# Patient Record
Sex: MALE | Race: WHITE | Employment: FULL TIME | URBAN - METROPOLITAN AREA
[De-identification: names, ages, dates, MRNs, and addresses within clinical notes are randomized per-mention and may not be internally consistent; named-entity substitution may affect disease eponyms.]

---

## 2017-03-28 DIAGNOSIS — I10 ESSENTIAL HYPERTENSION: ICD-10-CM

## 2017-03-28 RX ORDER — METOPROLOL SUCCINATE 25 MG/1
25 TABLET, EXTENDED RELEASE ORAL DAILY
Qty: 30 TABLET | Refills: 5 | Status: SHIPPED | OUTPATIENT
Start: 2017-03-28 | End: 2018-03-01 | Stop reason: SDUPTHER

## 2017-04-25 RX ORDER — GLIMEPIRIDE 4 MG/1
TABLET ORAL
Qty: 60 TABLET | Refills: 3 | Status: SHIPPED | OUTPATIENT
Start: 2017-04-25 | End: 2017-11-23 | Stop reason: SDUPTHER

## 2017-10-27 ENCOUNTER — OFFICE VISIT (OUTPATIENT)
Dept: FAMILY MEDICINE CLINIC | Age: 37
End: 2017-10-27

## 2017-10-27 VITALS
BODY MASS INDEX: 34.81 KG/M2 | HEART RATE: 72 BPM | DIASTOLIC BLOOD PRESSURE: 84 MMHG | SYSTOLIC BLOOD PRESSURE: 126 MMHG | HEIGHT: 72 IN | TEMPERATURE: 98.3 F | WEIGHT: 257 LBS | RESPIRATION RATE: 12 BRPM

## 2017-10-27 DIAGNOSIS — E11.42 DIABETIC POLYNEUROPATHY ASSOCIATED WITH TYPE 2 DIABETES MELLITUS (HCC): ICD-10-CM

## 2017-10-27 DIAGNOSIS — E78.5 DYSLIPIDEMIA: ICD-10-CM

## 2017-10-27 LAB
CREATININE URINE: 219 MG/DL
HBA1C MFR BLD: 10.1 %
MICROALBUMIN UR-MCNC: 1.8 MG/DL
MICROALBUMIN/CREAT UR-RTO: 8.2 MG/G (ref 0–30)

## 2017-10-27 PROCEDURE — 99213 OFFICE O/P EST LOW 20 MIN: CPT | Performed by: INTERNAL MEDICINE

## 2017-10-27 PROCEDURE — 83036 HEMOGLOBIN GLYCOSYLATED A1C: CPT | Performed by: INTERNAL MEDICINE

## 2017-10-27 RX ORDER — ATORVASTATIN CALCIUM 20 MG/1
40 TABLET, FILM COATED ORAL DAILY
Qty: 90 TABLET | Refills: 3 | Status: SHIPPED | OUTPATIENT
Start: 2017-10-27 | End: 2018-11-27 | Stop reason: SDUPTHER

## 2017-10-27 RX ORDER — GABAPENTIN 100 MG/1
100 CAPSULE ORAL 2 TIMES DAILY
Qty: 90 CAPSULE | Refills: 3 | Status: SHIPPED | OUTPATIENT
Start: 2017-10-27 | End: 2018-11-27 | Stop reason: DRUGHIGH

## 2017-10-27 ASSESSMENT — ENCOUNTER SYMPTOMS
SHORTNESS OF BREATH: 0
EYE PAIN: 0
BACK PAIN: 0
ABDOMINAL PAIN: 0
COLOR CHANGE: 0
CONSTIPATION: 0
SORE THROAT: 0
VOICE CHANGE: 0
EYE ITCHING: 0
WHEEZING: 0
TROUBLE SWALLOWING: 0
COUGH: 0
EYE DISCHARGE: 0
EYE REDNESS: 0
DIARRHEA: 0
BLOOD IN STOOL: 0
PHOTOPHOBIA: 0
ABDOMINAL DISTENTION: 0
NAUSEA: 0

## 2017-10-27 NOTE — PROGRESS NOTES
Subjective:      Patient ID: Harshad Greene is a 42 y.o. male    HPI     Foot heat x 2 weeks. Patient complains that at work, his feet feel hot (at the top and bottom). He vehemently denies pain, tingling or numbness. No imbalance, no wounds on the feet. Attests to daily foot and toe examinations. No trauma to feet   on the bottom amd top pof feet  No blurry vision, no chest pain no headache or speech problems, no leg cramps. No excessive urination or thirst.     Attests to poor dietary compliance and lack of exercise. Twenty pound weight loss noted since July 2016. 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 Last hb A1c Jan 2016 was 10.1. Past Medical History:   Diagnosis Date    Contact dermatitis     Dyspepsia     GERD (gastroesophageal reflux disease)     Hypertension     Kidney stones 8/2013    Left inguinal hernia     Obesity     Plantar fasciitis     Type II or unspecified type diabetes mellitus without mention of complication, not stated as uncontrolled      Past Surgical History:   Procedure Laterality Date    VASECTOMY       Social History     Social History    Marital status:      Spouse name: N/A    Number of children: N/A    Years of education: N/A     Occupational History    Not on file.      Social History Main Topics    Smoking status: Never Smoker    Smokeless tobacco: Never Used    Alcohol use Yes      Comment: rare    Drug use: Unknown    Sexual activity: Not on file     Other Topics Concern    Not on file     Social History Narrative    No narrative on file     Family History   Problem Relation Age of Onset    Heart Disease Father     Diabetes Maternal Grandfather      Allergies:  Norvasc [amlodipine]  Patient Active Problem List   Diagnosis    Hypertension    Diabetes mellitus type 2, uncontrolled ([address]    Inguinal hernia    Multiple-type hyperlipidemia    Adiposity     Current Outpatient Prescriptions on File Prior to Visit   Medication Sig Dispense Refill    glimepiride (AMARYL) 4 MG tablet Take 1/2 in am;1 in aft;and 1/2 at 3 AM 60 tablet 3    metoprolol succinate (TOPROL XL) 25 MG extended release tablet Take 1 tablet by mouth daily 30 tablet 5    lisinopril (PRINIVIL;ZESTRIL) 40 MG tablet TAKE 1 TABLET BY MOUTH EVERY DAY 90 tablet 3    cephALEXin (KEFLEX) 500 MG capsule Take 1caps tid 21 capsule 0    predniSONE (DELTASONE) 20 MG tablet Take 1 bid x 2 days, then 1.5 qd x 2 days,then 1 daily x 2days,then 1/2 daily till gone 11 tablet 1    clobetasol (TEMOVATE) 0.05 % cream Apply topically 2 times daily. 30 g 1    cetirizine (ZYRTEC) 10 MG tablet   0    ranitidine (ZANTAC) 300 MG tablet   0    HYDROcodone-acetaminophen (NORCO) 5-325 MG per tablet   0    sildenafil (VIAGRA) 100 MG tablet Take 1 tablet by mouth as needed for Erectile Dysfunction 6 tablet 5    betamethasone dipropionate (DIPROLENE) 0.05 % ointment Apply topically daily. 45 g 1    sitaGLIPtan-metformin (JANUMET)  MG per tablet Take 1 tablet by mouth 2 times daily (with meals). 60 tablet 0    ACCU-CHEK FASTCLIX LANCETS MISC Test Tid as directed      glucose blood VI test strips (ACCU-CHEK SMARTVIEW) strip Test TID as directed       No current facility-administered medications on file prior to visit. Review of Systems   Constitutional: Negative for activity change, appetite change, chills, diaphoresis, fatigue, fever and unexpected weight change. HENT: Negative for congestion, dental problem, drooling, ear discharge, ear pain, hearing loss, mouth sores, sore throat, trouble swallowing and voice change. Eyes: Negative for photophobia, pain, discharge, redness and itching. Respiratory: Negative for cough, shortness of breath and wheezing. Gastrointestinal: Negative for abdominal distention, abdominal pain, blood in stool, constipation, diarrhea and nausea. Endocrine: Negative for cold intolerance, heat intolerance, polydipsia and polyuria.    Genitourinary: Amb External Referral To Ophthalmology    insulin glargine (LANTUS SOLOSTAR) 100 UNIT/ML injection pen    gabapentin (NEURONTIN) 100 MG capsule    Insulin Pen Needle (B-D ULTRAFINE III SHORT PEN) 31G X 8 MM MISC   2. Diabetic polyneuropathy associated with type 2 diabetes mellitus (HCC)  Amb External Referral To Podiatry    gabapentin (NEURONTIN) 100 MG capsule   3.  Dyslipidemia  atorvastatin (LIPITOR) 20 MG tablet         Plan:      Orders Placed This Encounter   Procedures    Microalbumin / Creatinine Urine Ratio     Standing Status:   Future     Standing Expiration Date:   10/27/2018    Amb External Referral To Podiatry     Referral Priority:   Routine     Referral Type:   Consult for Advice and Opinion     Referral Reason:   Specialty Services Required     Referred to Provider:   Estee Tate DPM     Requested Specialty:   Podiatry     Number of Visits Requested:   1    Amb External Referral To Ophthalmology     Referral Priority:   Routine     Referral Type:   Consult for Advice and Opinion     Referral Reason:   Specialty Services Required     Referred to Provider:   Cathryn Powell MD     Requested Specialty:   Ophthalmology     Number of Visits Requested:   1    POCT glycosylated hemoglobin (Hb A1C)     Results for POC orders placed in visit on 10/27/17   POCT glycosylated hemoglobin (Hb A1C)   Result Value Ref Range    Hemoglobin A1C 10.1 %       Orders Placed This Encounter   Medications    insulin glargine (LANTUS SOLOSTAR) 100 UNIT/ML injection pen     Sig: Inject 30 Units into the skin nightly     Dispense:  5 Pen     Refill:  3    gabapentin (NEURONTIN) 100 MG capsule     Sig: Take 1 capsule by mouth 2 times daily     Dispense:  90 capsule     Refill:  3    Insulin Pen Needle (B-D ULTRAFINE III SHORT PEN) 31G X 8 MM MISC     Si each by Does not apply route daily     Dispense:  100 each     Refill:  5    atorvastatin (LIPITOR) 20 MG tablet     Sig: Take 2 tablets by mouth daily Dispense:  90 tablet     Refill:  3       Return in about 3 months (around 1/27/2018) for assess response to treatment with Dr. Ludwin Paul. But call back in 1 month to provide BG readings   Offered diabetic dietary  referral but declined.

## 2017-10-30 ENCOUNTER — TELEPHONE (OUTPATIENT)
Dept: FAMILY MEDICINE CLINIC | Age: 37
End: 2017-10-30

## 2017-10-30 NOTE — TELEPHONE ENCOUNTER
Patient was seen on 10/27/17 with Dr Ben Johnson. His wife wanted to know when the patient should take his insulin? She states he was told during the day, but his prescription says at night. She also states that he takes his diabetic pills during the day, and sleeps at night so she just wants to make sure the patient takes it at the correct time. Please advise.

## 2017-11-24 RX ORDER — GLIMEPIRIDE 4 MG/1
TABLET ORAL
Qty: 60 TABLET | Refills: 5 | Status: SHIPPED | OUTPATIENT
Start: 2017-11-24 | End: 2018-06-10 | Stop reason: SDUPTHER

## 2017-11-28 ENCOUNTER — HOSPITAL ENCOUNTER (OUTPATIENT)
Dept: GENERAL RADIOLOGY | Age: 37
Discharge: HOME | End: 2017-11-28
Payer: COMMERCIAL

## 2017-11-28 DIAGNOSIS — R52 PAIN: ICD-10-CM

## 2017-11-28 PROCEDURE — 73630 X-RAY EXAM OF FOOT: CPT

## 2017-12-14 ENCOUNTER — TELEPHONE (OUTPATIENT)
Dept: FAMILY MEDICINE CLINIC | Age: 37
End: 2017-12-14

## 2017-12-14 DIAGNOSIS — Z86.69 HX OF PERIPHERAL NEUROPATHY: Primary | ICD-10-CM

## 2017-12-14 RX ORDER — GABAPENTIN 300 MG/1
CAPSULE ORAL
Qty: 60 CAPSULE | Refills: 3 | Status: SHIPPED | OUTPATIENT
Start: 2017-12-14 | End: 2018-11-27 | Stop reason: SDUPTHER

## 2018-02-01 DIAGNOSIS — I15.9 SECONDARY HYPERTENSION: ICD-10-CM

## 2018-02-01 RX ORDER — LISINOPRIL 40 MG/1
TABLET ORAL
Qty: 90 TABLET | Refills: 2 | Status: SHIPPED | OUTPATIENT
Start: 2018-02-01 | End: 2018-11-27 | Stop reason: SDUPTHER

## 2018-02-01 NOTE — TELEPHONE ENCOUNTER
Pharmacy requesting refill please approve or deny. Last seen 10/27/2017  Last filled 12/14/2016    No future appointments.

## 2018-06-10 RX ORDER — GLIMEPIRIDE 4 MG/1
TABLET ORAL
Qty: 60 TABLET | Refills: 5 | Status: SHIPPED | OUTPATIENT
Start: 2018-06-10 | End: 2018-12-04 | Stop reason: CLARIF

## 2018-11-08 DIAGNOSIS — E78.5 DYSLIPIDEMIA: ICD-10-CM

## 2018-11-08 RX ORDER — ATORVASTATIN CALCIUM 20 MG/1
40 TABLET, FILM COATED ORAL DAILY
Qty: 90 TABLET | Refills: 0 | OUTPATIENT
Start: 2018-11-08

## 2018-11-27 ENCOUNTER — OFFICE VISIT (OUTPATIENT)
Dept: FAMILY MEDICINE CLINIC | Age: 38
End: 2018-11-27
Payer: COMMERCIAL

## 2018-11-27 VITALS
RESPIRATION RATE: 14 BRPM | SYSTOLIC BLOOD PRESSURE: 136 MMHG | HEART RATE: 89 BPM | WEIGHT: 260 LBS | DIASTOLIC BLOOD PRESSURE: 82 MMHG | HEIGHT: 72 IN | TEMPERATURE: 96 F | BODY MASS INDEX: 35.21 KG/M2

## 2018-11-27 DIAGNOSIS — Z86.69 HX OF PERIPHERAL NEUROPATHY: ICD-10-CM

## 2018-11-27 DIAGNOSIS — I10 ESSENTIAL HYPERTENSION: ICD-10-CM

## 2018-11-27 DIAGNOSIS — E78.5 DYSLIPIDEMIA: ICD-10-CM

## 2018-11-27 LAB — HBA1C MFR BLD: 10.7 %

## 2018-11-27 PROCEDURE — 83036 HEMOGLOBIN GLYCOSYLATED A1C: CPT | Performed by: FAMILY MEDICINE

## 2018-11-27 PROCEDURE — 99213 OFFICE O/P EST LOW 20 MIN: CPT | Performed by: FAMILY MEDICINE

## 2018-11-27 RX ORDER — GLIMEPIRIDE 4 MG/1
TABLET ORAL
Qty: 60 TABLET | Refills: 5 | Status: SHIPPED | OUTPATIENT
Start: 2018-11-27

## 2018-11-27 RX ORDER — LISINOPRIL 40 MG/1
TABLET ORAL
Qty: 90 TABLET | Refills: 3 | Status: SHIPPED | OUTPATIENT
Start: 2018-11-27

## 2018-11-27 RX ORDER — GLIMEPIRIDE 4 MG/1
TABLET ORAL
Qty: 60 TABLET | Refills: 5 | Status: CANCELLED | OUTPATIENT
Start: 2018-11-27

## 2018-11-27 RX ORDER — GABAPENTIN 300 MG/1
CAPSULE ORAL
Qty: 180 CAPSULE | Refills: 3 | Status: SHIPPED | OUTPATIENT
Start: 2018-11-27 | End: 2018-12-27

## 2018-11-27 RX ORDER — ATORVASTATIN CALCIUM 20 MG/1
TABLET, FILM COATED ORAL
Qty: 90 TABLET | Refills: 3 | Status: SHIPPED | OUTPATIENT
Start: 2018-11-27

## 2018-11-27 RX ORDER — METOPROLOL SUCCINATE 25 MG/1
25 TABLET, EXTENDED RELEASE ORAL DAILY
Qty: 90 TABLET | Refills: 3 | Status: SHIPPED | OUTPATIENT
Start: 2018-11-27

## 2018-11-27 ASSESSMENT — PATIENT HEALTH QUESTIONNAIRE - PHQ9
2. FEELING DOWN, DEPRESSED OR HOPELESS: 0
SUM OF ALL RESPONSES TO PHQ QUESTIONS 1-9: 0
SUM OF ALL RESPONSES TO PHQ9 QUESTIONS 1 & 2: 0
1. LITTLE INTEREST OR PLEASURE IN DOING THINGS: 0
SUM OF ALL RESPONSES TO PHQ QUESTIONS 1-9: 0

## 2018-11-27 NOTE — PROGRESS NOTES
Father     Diabetes Maternal Grandfather           Current Outpatient Prescriptions on File Prior to Visit   Medication Sig Dispense Refill    Insulin Pen Needle (B-D ULTRAFINE III SHORT PEN) 31G X 8 MM MISC 1 each by Does not apply route daily 100 each 5    ACCU-CHEK FASTCLIX LANCETS MISC Test Tid as directed      glucose blood VI test strips (ACCU-CHEK SMARTVIEW) strip Test TID as directed      insulin glargine (LANTUS SOLOSTAR) 100 UNIT/ML injection pen Inject 30 Units into the skin nightly 5 Pen 3    sildenafil (VIAGRA) 100 MG tablet Take 1 tablet by mouth as needed for Erectile Dysfunction 6 tablet 5     No current facility-administered medications on file prior to visit. Objective    Vitals:    11/27/18 1410   BP: 136/82   Site: Left Upper Arm   Position: Sitting   Cuff Size: Large Adult   Pulse: 89   Resp: 14   Temp: 96 °F (35.6 °C)   TempSrc: Temporal   Weight: 260 lb (117.9 kg)   Height: 5' 11.5\" (1.816 m)     Physical Exam   Constitutional: He is oriented to person, place, and time. He appears well-developed and well-nourished. No distress. HENT:   Head: Normocephalic. Eyes: Pupils are equal, round, and reactive to light. Conjunctivae and EOM are normal.   Neck: Normal range of motion and full passive range of motion without pain. Neck supple. Carotid bruit is not present. No neck rigidity. No thyroid mass and no thyromegaly present. Cardiovascular: Normal rate, regular rhythm, normal heart sounds and intact distal pulses. No extrasystoles are present. Exam reveals no gallop. No murmur heard. Pulses:       Dorsalis pedis pulses are 1+ on the right side, and 1+ on the left side. Posterior tibial pulses are 1+ on the right side, and 1+ on the left side. Pulmonary/Chest: Breath sounds normal. No respiratory distress. Musculoskeletal: He exhibits no edema. Neurological: He is alert and oriented to person, place, and time. He has normal strength.  A sensory deficit (red vib AFTERNOON, AND 1/2 TABLET AT 3AM 60 tablet 5    [DISCONTINUED] lisinopril (PRINIVIL;ZESTRIL) 40 MG tablet TAKE 1 TABLET BY MOUTH EVERY DAY 90 tablet 2    [DISCONTINUED] gabapentin (NEURONTIN) 300 MG capsule Take 1 bid wf 60 capsule 3    insulin glargine (LANTUS SOLOSTAR) 100 UNIT/ML injection pen Inject 30 Units into the skin nightly 5 Pen 3    [DISCONTINUED] gabapentin (NEURONTIN) 100 MG capsule Take 1 capsule by mouth 2 times daily 90 capsule 3    [DISCONTINUED] atorvastatin (LIPITOR) 20 MG tablet Take 2 tablets by mouth daily 90 tablet 3    [DISCONTINUED] cephALEXin (KEFLEX) 500 MG capsule Take 1caps tid 21 capsule 0    [DISCONTINUED] predniSONE (DELTASONE) 20 MG tablet Take 1 bid x 2 days, then 1.5 qd x 2 days,then 1 daily x 2days,then 1/2 daily till gone 11 tablet 1    [DISCONTINUED] clobetasol (TEMOVATE) 0.05 % cream Apply topically 2 times daily. 30 g 1    [DISCONTINUED] cetirizine (ZYRTEC) 10 MG tablet   0    [DISCONTINUED] ranitidine (ZANTAC) 300 MG tablet   0    sildenafil (VIAGRA) 100 MG tablet Take 1 tablet by mouth as needed for Erectile Dysfunction 6 tablet 5    [DISCONTINUED] HYDROcodone-acetaminophen (NORCO) 5-325 MG per tablet   0    [DISCONTINUED] betamethasone dipropionate (DIPROLENE) 0.05 % ointment Apply topically daily. 45 g 1    [DISCONTINUED] sitaGLIPtan-metformin (JANUMET)  MG per tablet Take 1 tablet by mouth 2 times daily (with meals). 60 tablet 0     No facility-administered encounter medications on file as of 11/27/2018. Call if worse  Disc goals-diet  Needs labs. ..   Medications Discontinued During This Encounter   Medication Reason    sitaGLIPtan-metformin (JANUMET)  MG per tablet Therapy completed    predniSONE (DELTASONE) 20 MG tablet Therapy completed    ranitidine (ZANTAC) 300 MG tablet Therapy completed    HYDROcodone-acetaminophen (NORCO) 5-325 MG per tablet Therapy completed    betamethasone dipropionate (DIPROLENE) 0.05 % ointment Therapy

## 2018-12-04 ASSESSMENT — ENCOUNTER SYMPTOMS
NAUSEA: 0
BLOOD IN STOOL: 0
COUGH: 0

## 2018-12-31 ENCOUNTER — TELEPHONE (OUTPATIENT)
Dept: FAMILY MEDICINE CLINIC | Age: 38
End: 2018-12-31

## 2018-12-31 DIAGNOSIS — E11.65 UNCONTROLLED TYPE 2 DIABETES MELLITUS WITH HYPERGLYCEMIA (HCC): Primary | ICD-10-CM

## 2019-02-16 ENCOUNTER — TELEPHONE (OUTPATIENT)
Dept: FAMILY MEDICINE CLINIC | Age: 39
End: 2019-02-16

## 2019-02-21 ENCOUNTER — TELEPHONE (OUTPATIENT)
Dept: FAMILY MEDICINE CLINIC | Age: 39
End: 2019-02-21

## 2019-02-27 ENCOUNTER — TELEPHONE (OUTPATIENT)
Dept: FAMILY MEDICINE CLINIC | Age: 39
End: 2019-02-27

## 2019-02-28 ENCOUNTER — TELEPHONE (OUTPATIENT)
Dept: ENDOCRINOLOGY | Age: 39
End: 2019-02-28

## 2023-08-01 DIAGNOSIS — I10 HYPERTENSION, UNSPECIFIED TYPE: ICD-10-CM

## 2023-08-01 DIAGNOSIS — E11.9 TYPE 2 DIABETES MELLITUS TREATED WITHOUT INSULIN (MULTI): Primary | ICD-10-CM

## 2023-08-01 PROBLEM — E78.5 DYSLIPIDEMIA: Status: ACTIVE | Noted: 2023-08-01

## 2023-08-01 PROBLEM — J01.00 ACUTE NON-RECURRENT MAXILLARY SINUSITIS: Status: ACTIVE | Noted: 2023-08-01

## 2023-08-01 RX ORDER — LISINOPRIL 40 MG/1
40 TABLET ORAL DAILY
Qty: 30 TABLET | Refills: 0 | Status: SHIPPED | OUTPATIENT
Start: 2023-08-01 | End: 2023-08-16 | Stop reason: SDUPTHER

## 2023-08-01 RX ORDER — LISINOPRIL 40 MG/1
40 TABLET ORAL DAILY
COMMUNITY
End: 2023-08-01 | Stop reason: SDUPTHER

## 2023-08-01 RX ORDER — GLIMEPIRIDE 4 MG/1
TABLET ORAL
Qty: 60 TABLET | Refills: 0 | Status: SHIPPED | OUTPATIENT
Start: 2023-08-01 | End: 2023-08-16 | Stop reason: SDUPTHER

## 2023-08-01 RX ORDER — GLIMEPIRIDE 4 MG/1
TABLET ORAL
COMMUNITY
End: 2023-08-01 | Stop reason: SDUPTHER

## 2023-08-01 NOTE — TELEPHONE ENCOUNTER
Rx Refill Request     Name: Montana Kern  :  1980   Medication Name:    Glimepiride 4 MG- Take 1/2 (.05) tablet by mouth twice daily before meals. Take 1 tablet by mouth before dinner.  Lisinopril 40 MG- Take 1 tablet by mouth daily.  Specific Pharmacy location:  [facility]  Date of last appointment:  23  Date of next appointment:  2023   Best number to reach patient:  [phone]

## 2023-08-11 RX ORDER — METOPROLOL SUCCINATE 25 MG/1
1 TABLET, EXTENDED RELEASE ORAL DAILY
COMMUNITY
Start: 2020-02-10 | End: 2023-11-06 | Stop reason: SDUPTHER

## 2023-08-11 RX ORDER — SEMAGLUTIDE 1.34 MG/ML
INJECTION, SOLUTION SUBCUTANEOUS
COMMUNITY
Start: 2022-01-17 | End: 2023-11-06 | Stop reason: SDUPTHER

## 2023-08-11 RX ORDER — ACETAMINOPHEN 500 MG
TABLET ORAL EVERY 8 HOURS PRN
COMMUNITY
End: 2023-08-16 | Stop reason: ALTCHOICE

## 2023-08-11 RX ORDER — METFORMIN HYDROCHLORIDE 1000 MG/1
1000 TABLET ORAL
COMMUNITY
Start: 2018-11-27 | End: 2023-11-02 | Stop reason: SDUPTHER

## 2023-08-11 RX ORDER — SEMAGLUTIDE 1.34 MG/ML
INJECTION, SOLUTION SUBCUTANEOUS
COMMUNITY
Start: 2022-10-04 | End: 2023-08-16 | Stop reason: DRUGHIGH

## 2023-08-11 RX ORDER — VARDENAFIL HYDROCHLORIDE 10 MG/1
10 TABLET ORAL AS NEEDED
COMMUNITY
Start: 2023-03-06 | End: 2023-08-16 | Stop reason: ALTCHOICE

## 2023-08-11 RX ORDER — GABAPENTIN 300 MG/1
1 CAPSULE ORAL 3 TIMES DAILY
COMMUNITY
Start: 2020-02-10 | End: 2023-11-06 | Stop reason: SDUPTHER

## 2023-08-11 RX ORDER — ATORVASTATIN CALCIUM 20 MG/1
20 TABLET, FILM COATED ORAL NIGHTLY
COMMUNITY
End: 2023-11-06 | Stop reason: SDUPTHER

## 2023-08-11 RX ORDER — TRIAMCINOLONE ACETONIDE 1 MG/G
CREAM TOPICAL
COMMUNITY
Start: 2023-07-01 | End: 2023-08-16 | Stop reason: ALTCHOICE

## 2023-08-11 RX ORDER — IBUPROFEN 600 MG/1
TABLET ORAL EVERY 8 HOURS PRN
COMMUNITY
End: 2023-08-16 | Stop reason: ALTCHOICE

## 2023-08-11 RX ORDER — METHYLPREDNISOLONE 4 MG/1
TABLET ORAL
COMMUNITY
Start: 2023-07-01 | End: 2023-08-16 | Stop reason: ALTCHOICE

## 2023-08-16 ENCOUNTER — OFFICE VISIT (OUTPATIENT)
Dept: PRIMARY CARE | Facility: CLINIC | Age: 43
End: 2023-08-16
Payer: COMMERCIAL

## 2023-08-16 VITALS
SYSTOLIC BLOOD PRESSURE: 112 MMHG | RESPIRATION RATE: 16 BRPM | OXYGEN SATURATION: 97 % | WEIGHT: 219 LBS | HEART RATE: 78 BPM | BODY MASS INDEX: 32.44 KG/M2 | DIASTOLIC BLOOD PRESSURE: 76 MMHG | HEIGHT: 69 IN | TEMPERATURE: 97.8 F

## 2023-08-16 DIAGNOSIS — K42.9 UMBILICAL HERNIA WITHOUT OBSTRUCTION AND WITHOUT GANGRENE: Primary | ICD-10-CM

## 2023-08-16 DIAGNOSIS — I10 HYPERTENSION, UNSPECIFIED TYPE: ICD-10-CM

## 2023-08-16 DIAGNOSIS — E11.9 TYPE 2 DIABETES MELLITUS TREATED WITHOUT INSULIN (MULTI): ICD-10-CM

## 2023-08-16 LAB — POC HEMOGLOBIN A1C: 9.7 % (ref 4.2–6.5)

## 2023-08-16 PROCEDURE — 3078F DIAST BP <80 MM HG: CPT | Performed by: FAMILY MEDICINE

## 2023-08-16 PROCEDURE — 4010F ACE/ARB THERAPY RXD/TAKEN: CPT | Performed by: FAMILY MEDICINE

## 2023-08-16 PROCEDURE — 1036F TOBACCO NON-USER: CPT | Performed by: FAMILY MEDICINE

## 2023-08-16 PROCEDURE — 99213 OFFICE O/P EST LOW 20 MIN: CPT | Performed by: FAMILY MEDICINE

## 2023-08-16 PROCEDURE — 3074F SYST BP LT 130 MM HG: CPT | Performed by: FAMILY MEDICINE

## 2023-08-16 PROCEDURE — 83036 HEMOGLOBIN GLYCOSYLATED A1C: CPT | Performed by: FAMILY MEDICINE

## 2023-08-16 RX ORDER — LISINOPRIL 40 MG/1
40 TABLET ORAL DAILY
Qty: 90 TABLET | Refills: 3 | Status: SHIPPED | OUTPATIENT
Start: 2023-08-16

## 2023-08-16 RX ORDER — ASPIRIN 81 MG/1
81 TABLET ORAL DAILY
COMMUNITY
End: 2023-11-27 | Stop reason: HOSPADM

## 2023-08-16 RX ORDER — LISINOPRIL 40 MG/1
40 TABLET ORAL DAILY
Qty: 90 TABLET | Refills: 3 | Status: CANCELLED | OUTPATIENT
Start: 2023-08-16

## 2023-08-16 RX ORDER — GLIMEPIRIDE 4 MG/1
TABLET ORAL
Qty: 180 TABLET | Refills: 3 | Status: SHIPPED | OUTPATIENT
Start: 2023-08-16

## 2023-08-16 RX ORDER — GLIMEPIRIDE 4 MG/1
TABLET ORAL
Qty: 180 TABLET | Refills: 3 | Status: CANCELLED | OUTPATIENT
Start: 2023-08-16

## 2023-08-16 NOTE — PROGRESS NOTES
"Subjective   Patient ID: Montana Kern is a 43 y.o. male who presents for Diabetes (6 month follow up ).  HPI  43-year-old male with a history of hypertension dyslipidemia and type 2 diabetes overall is been off his diet admittedly he is taking his metformin and Amaryl 3 times a day.  He is off his Ozempic and we did review diabetic goals diabetic diet and weight loss.  His last A1c was elevated.  Does take Lipitor at nighttime for dyslipidemia and takes both metoprolol and lisinopril for hypertension  Gratefully no chest pain shortness breath or palpitations no change in visual acuity no change in foot exam  Patient aware of need for improved glycemic control will recheck in 2 to 3 months with appropriate lab  Frequent small meals low in salt carbohydrate and fat again reviewed with the patient  Review of Systems   Constitutional:  Negative for fatigue and fever.   Eyes:  Negative for visual disturbance.   Respiratory:  Negative for cough, chest tightness and shortness of breath.    Cardiovascular:  Negative for chest pain, palpitations and leg swelling.   Gastrointestinal:  Negative for abdominal pain and blood in stool.   Genitourinary:  Positive for frequency. Negative for dysuria and hematuria.   Musculoskeletal:  Positive for myalgias. Negative for arthralgias.   Skin:  Negative for rash.   Neurological:  Negative for weakness, light-headedness and headaches.   Psychiatric/Behavioral:  Negative for behavioral problems, decreased concentration, sleep disturbance and suicidal ideas.        Objective   /76 (BP Location: Right arm, Patient Position: Sitting, BP Cuff Size: Adult)   Pulse 78   Temp 36.6 °C (97.8 °F) (Temporal)   Resp 16   Ht 1.753 m (5' 9\")   Wt 99.3 kg (219 lb)   SpO2 97%   BMI 32.34 kg/m²     Lab Results   Component Value Date    HGBA1C 9.7 (A) 08/16/2023    HGBA1C 9.3 (A) 01/15/2022    HGBA1C 11.5 (A) 10/09/2021     Lab Results   Component Value Date    CREATININE 0.79 01/15/2022 "       Chemistry    Lab Results   Component Value Date/Time     01/15/2022 0859    K 3.9 01/15/2022 0859     01/15/2022 0859    CO2 28 01/15/2022 0859    BUN 13 01/15/2022 0859    CREATININE 0.79 01/15/2022 0859    Lab Results   Component Value Date/Time    CALCIUM 8.8 01/15/2022 0859    ALKPHOS 49 01/15/2022 0859    AST 13 01/15/2022 0859    ALT 15 01/15/2022 0859    BILITOT 0.8 01/15/2022 0859        Physical Exam    Reviewed and normally 7 pound weight loss pulse ox is normal  General-inspection is a large frame male overweight individual in no acute distress  Neck neck is all without masses adenopathy bruits or rigidity  Cardiovascular-RSR without significant murmurs gallop or ectopy  Respiratory-chest clear without wheezing rales or rhonchi  Peripheral vascular no sensorimotor vascular deficits no symmetric or asymmetric edema  Skin no rash petechiae or jaundice  Mood-no anxiety depressive or cognitive issues  Lengthy review in regards to diabetic diet diabetic goals and need to follow-up.  POCT glycosylated hemoglobin (Hb A1C) manually resulted  Order: [id number]  Status: Final result       Visible to patient: Yes (seen)       Next appt: 11/06/2023 at 04:00 PM in [facility] (Leonardo Muller DO)       Dx: Type 2 diabetes mellitus treated with...    2 Result Notes       1  Topic       Component Ref Range & Units 6 d ago   POC HEMOGLOBIN A1c 4.2 - 6.5 % 9.7 Abnormal                        Assessment/Plan   Problem List Items Addressed This Visit          Cardiac and Vasculature    HTN (hypertension)       Endocrine/Metabolic    Type 2 diabetes mellitus treated without insulin (CMS/HCC)    Relevant Orders    POCT glycosylated hemoglobin (Hb A1C) manually resulted   Declines Ozempic because of cost but will take a half at tablet of 4 mg Amaryl before breakfast before lunch and then 4 mg before supper continue metformin twice a day improved low-salt low carbohydrate and low-fat diet  Continue Lipitor at  night and continue his antihypertensive medicines the form of lisinopril and metoprolol.  We will follow-up in 3 months with previsit LDL CMP A1c.  Above meds reviewed and importance of medicine as well as dietary compliance again reviewed maintain eye exam annually as well as nightly foot exam  @discharge  The above diagnosis and treatment plan was discussed with the patient patient will continue appropriate diet and exercise as reviewed  Patient will recheck earlier if any interval problems of significance or clinical worsening of the above problems.  Agrees above surveillance.  All question were addressed regarding above meds

## 2023-08-22 ASSESSMENT — ENCOUNTER SYMPTOMS
MYALGIAS: 1
HEADACHES: 0
SHORTNESS OF BREATH: 0
LIGHT-HEADEDNESS: 0
DECREASED CONCENTRATION: 0
ARTHRALGIAS: 0
WEAKNESS: 0
SLEEP DISTURBANCE: 0
FATIGUE: 0
PALPITATIONS: 0
BLOOD IN STOOL: 0
COUGH: 0
CHEST TIGHTNESS: 0
FEVER: 0
DYSURIA: 0
FREQUENCY: 1
ABDOMINAL PAIN: 0
HEMATURIA: 0

## 2023-08-31 LAB
ANION GAP IN SER/PLAS: 11 MMOL/L (ref 10–20)
CALCIUM (MG/DL) IN SER/PLAS: 8.8 MG/DL (ref 8.6–10.3)
CARBON DIOXIDE, TOTAL (MMOL/L) IN SER/PLAS: 29 MMOL/L (ref 21–32)
CHLORIDE (MMOL/L) IN SER/PLAS: 104 MMOL/L (ref 98–107)
CREATININE (MG/DL) IN SER/PLAS: 0.92 MG/DL (ref 0.5–1.3)
GFR MALE: >90 ML/MIN/1.73M2
GLUCOSE (MG/DL) IN SER/PLAS: 104 MG/DL (ref 74–99)
POTASSIUM (MMOL/L) IN SER/PLAS: 4 MMOL/L (ref 3.5–5.3)
SODIUM (MMOL/L) IN SER/PLAS: 140 MMOL/L (ref 136–145)
UREA NITROGEN (MG/DL) IN SER/PLAS: 14 MG/DL (ref 6–23)

## 2023-11-02 DIAGNOSIS — E11.9 TYPE 2 DIABETES MELLITUS TREATED WITHOUT INSULIN (MULTI): ICD-10-CM

## 2023-11-02 RX ORDER — METFORMIN HYDROCHLORIDE 1000 MG/1
1000 TABLET ORAL
Qty: 180 TABLET | Refills: 3 | Status: SHIPPED | OUTPATIENT
Start: 2023-11-02

## 2023-11-02 NOTE — TELEPHONE ENCOUNTER
Rx Refill Request     Name: Montana Kern  :  1980   Medication Name:  METFORMIN 1,000MG  Specific Pharmacy location:  [facility]  Date of last appointment:  2023   Date of next appointment:  2023   Best number to reach patient:  [phone]

## 2023-11-04 ENCOUNTER — LAB (OUTPATIENT)
Dept: LAB | Facility: LAB | Age: 43
End: 2023-11-04
Payer: COMMERCIAL

## 2023-11-04 DIAGNOSIS — E11.9 TYPE 2 DIABETES MELLITUS TREATED WITHOUT INSULIN (MULTI): ICD-10-CM

## 2023-11-04 DIAGNOSIS — I10 HYPERTENSION, UNSPECIFIED TYPE: ICD-10-CM

## 2023-11-04 LAB
ALBUMIN SERPL BCP-MCNC: 4 G/DL (ref 3.4–5)
ALP SERPL-CCNC: 47 U/L (ref 33–120)
ALT SERPL W P-5'-P-CCNC: 15 U/L (ref 10–52)
ANION GAP SERPL CALC-SCNC: 10 MMOL/L (ref 10–20)
AST SERPL W P-5'-P-CCNC: 13 U/L (ref 9–39)
BILIRUB SERPL-MCNC: 1 MG/DL (ref 0–1.2)
BUN SERPL-MCNC: 12 MG/DL (ref 6–23)
CALCIUM SERPL-MCNC: 9 MG/DL (ref 8.6–10.3)
CHLORIDE SERPL-SCNC: 106 MMOL/L (ref 98–107)
CO2 SERPL-SCNC: 29 MMOL/L (ref 21–32)
CREAT SERPL-MCNC: 0.82 MG/DL (ref 0.5–1.3)
EST. AVERAGE GLUCOSE BLD GHB EST-MCNC: 151 MG/DL
GFR SERPL CREATININE-BSD FRML MDRD: >90 ML/MIN/1.73M*2
GLUCOSE SERPL-MCNC: 146 MG/DL (ref 74–99)
HBA1C MFR BLD: 6.9 %
LDLC SERPL DIRECT ASSAY-MCNC: 84 MG/DL (ref 0–129)
POTASSIUM SERPL-SCNC: 4 MMOL/L (ref 3.5–5.3)
PROT SERPL-MCNC: 6.1 G/DL (ref 6.4–8.2)
SODIUM SERPL-SCNC: 141 MMOL/L (ref 136–145)

## 2023-11-04 PROCEDURE — 83036 HEMOGLOBIN GLYCOSYLATED A1C: CPT

## 2023-11-04 PROCEDURE — 36415 COLL VENOUS BLD VENIPUNCTURE: CPT

## 2023-11-04 PROCEDURE — 80053 COMPREHEN METABOLIC PANEL: CPT

## 2023-11-04 PROCEDURE — 83721 ASSAY OF BLOOD LIPOPROTEIN: CPT

## 2023-11-06 ENCOUNTER — OFFICE VISIT (OUTPATIENT)
Dept: PRIMARY CARE | Facility: CLINIC | Age: 43
End: 2023-11-06
Payer: COMMERCIAL

## 2023-11-06 VITALS
SYSTOLIC BLOOD PRESSURE: 116 MMHG | HEART RATE: 87 BPM | HEIGHT: 69 IN | OXYGEN SATURATION: 97 % | RESPIRATION RATE: 16 BRPM | TEMPERATURE: 97.7 F | BODY MASS INDEX: 31.99 KG/M2 | WEIGHT: 216 LBS | DIASTOLIC BLOOD PRESSURE: 78 MMHG

## 2023-11-06 DIAGNOSIS — I10 HYPERTENSION, UNSPECIFIED TYPE: ICD-10-CM

## 2023-11-06 DIAGNOSIS — E11.9 TYPE 2 DIABETES MELLITUS TREATED WITHOUT INSULIN (MULTI): ICD-10-CM

## 2023-11-06 DIAGNOSIS — E78.5 DYSLIPIDEMIA: ICD-10-CM

## 2023-11-06 PROCEDURE — 4010F ACE/ARB THERAPY RXD/TAKEN: CPT | Performed by: FAMILY MEDICINE

## 2023-11-06 PROCEDURE — 3074F SYST BP LT 130 MM HG: CPT | Performed by: FAMILY MEDICINE

## 2023-11-06 PROCEDURE — 99213 OFFICE O/P EST LOW 20 MIN: CPT | Performed by: FAMILY MEDICINE

## 2023-11-06 PROCEDURE — 3044F HG A1C LEVEL LT 7.0%: CPT | Performed by: FAMILY MEDICINE

## 2023-11-06 PROCEDURE — 3078F DIAST BP <80 MM HG: CPT | Performed by: FAMILY MEDICINE

## 2023-11-06 PROCEDURE — 3048F LDL-C <100 MG/DL: CPT | Performed by: FAMILY MEDICINE

## 2023-11-06 PROCEDURE — 1036F TOBACCO NON-USER: CPT | Performed by: FAMILY MEDICINE

## 2023-11-06 RX ORDER — ATORVASTATIN CALCIUM 20 MG/1
20 TABLET, FILM COATED ORAL NIGHTLY
Qty: 90 TABLET | Refills: 3 | Status: SHIPPED | OUTPATIENT
Start: 2023-11-06

## 2023-11-06 RX ORDER — GABAPENTIN 300 MG/1
300 CAPSULE ORAL 3 TIMES DAILY
Qty: 270 CAPSULE | Refills: 3 | Status: SHIPPED | OUTPATIENT
Start: 2023-11-06

## 2023-11-06 RX ORDER — METOPROLOL SUCCINATE 25 MG/1
25 TABLET, EXTENDED RELEASE ORAL DAILY
Qty: 90 TABLET | Refills: 3 | Status: SHIPPED | OUTPATIENT
Start: 2023-11-06

## 2023-11-06 RX ORDER — SEMAGLUTIDE 1.34 MG/ML
1 INJECTION, SOLUTION SUBCUTANEOUS
Qty: 9 ML | Refills: 3 | Status: SHIPPED | OUTPATIENT
Start: 2023-11-06 | End: 2023-12-10 | Stop reason: SDUPTHER

## 2023-11-06 NOTE — PROGRESS NOTES
"Subjective   Patient ID: Montana Kern is a 43 y.o. male who presents for Diabetes (3 month follow up ).  HPI  43-year-old gentleman is here to recheck his A1c after 9.7 last August and with the addition of markedly improved low carbohydrate diet and Ozempic his A1c is now down to 6.9 which is excellent recent sugars 146 and the remainder of his chemistries are normal as well as his LDL.  He significantly reduced his starches and sweets in great to see his A1c is gone from 9 down to 6.9.  He also takes Lipitor 20 mg for dyslipidemia and also 25 mg metoprolol for hypertension he also follows a low-salt and low-fat diet well  Patient remains active with at least 2 different jobs without any resting or exertional chest pain shortness of breath or palpitations.  Meds reviewed no reported side effects.  Does take also Amaryl 2 mg before breakfast lunch and then 4 mg before supper.  Review of Systems   Constitutional:  Negative for fatigue and fever.   Eyes:  Negative for visual disturbance.   Respiratory:  Negative for cough, chest tightness and shortness of breath.    Cardiovascular:  Negative for chest pain, palpitations and leg swelling.   Gastrointestinal:  Negative for abdominal pain and blood in stool.   Genitourinary:  Positive for frequency. Negative for dysuria and hematuria.   Musculoskeletal:  Positive for myalgias. Negative for arthralgias.   Skin:  Negative for rash.   Neurological:  Positive for numbness (Angling numbness of his feet which is improved with his glycemic control and also Neurontin if needed at nighttime instead of 3 times a day). Negative for weakness, light-headedness and headaches.   Psychiatric/Behavioral:  Negative for behavioral problems, sleep disturbance and suicidal ideas.        Objective   /78 (BP Location: Left arm, Patient Position: Sitting, BP Cuff Size: Large adult)   Pulse 87   Temp 36.5 °C (97.7 °F) (Temporal)   Resp 16   Ht 1.753 m (5' 9\")   Wt 98 kg (216 lb)   " SpO2 97%   BMI 31.90 kg/m²     Lab Results   Component Value Date    HGBA1C 6.9 (H) 11/04/2023    HGBA1C 9.7 (A) 08/16/2023    HGBA1C 9.3 (A) 01/15/2022     Lab Results   Component Value Date    CREATININE 0.82 11/04/2023         Chemistry    Lab Results   Component Value Date/Time     11/04/2023 1143    K 4.0 11/04/2023 1143     11/04/2023 1143    CO2 29 11/04/2023 1143    BUN 12 11/04/2023 1143    CREATININE 0.82 11/04/2023 1143    Lab Results   Component Value Date/Time    CALCIUM 9.0 11/04/2023 1143    ALKPHOS 47 11/04/2023 1143    AST 13 11/04/2023 1143    ALT 15 11/04/2023 1143    BILITOT 1.0 11/04/2023 1143        Component  Ref Range & Units 2 d ago 2 yr ago 3 yr ago   LDL, Direct  0 - 129 mg/dL 84 152 High   CM   Resulting Agency [facility]              Narrative  Performed by: [facility]  Elevated levels of LDL cholesterol are recognized as a key factor in the development of atherosclerosis and CHD. The direct LDL cholesterol test can be used to assess cardiovascular risk and monitor therapy as a follow up to  a lipid profile when triglycerides are significantly elevated.      Specimen Collected: 11/04/23 11:43 Last Resulted: 11/04/23 21:20            Visible to patient: Yes (seen)       Dx: Type 2 diabetes mellitus treated with...    0 Result Notes       1  Topic         Component  Ref Range & Units 2 d ago 1 yr ago 2 yr ago 3 yr ago   Hemoglobin A1C  see below % 6.9 High  9.3 Abnormal  R, CM 11.5 Abnormal  R, CM 10.9 R, CM   Estimated Average Glucose  Not Established mg/dL 151 220 R 283 R 266 R   Resulting Agency [facility]              Narrative  Performed by: [facility]  Diagnosis of Diabetes-Adults  Non-Diabetic: < or = 5.6%  Increased risk for developing diabetes: 5.7-6.4%  Diagnostic of diabetes: > or = 6.5%           Physical Exam  All signs reviewed and normal pulse ox 97  General-inspection reveals a pleasant interactive individual in no acute  distress  Neck neck is supple no mass adenopathy bruits or rigidity  Cardiovascular-RSR without significant murmurs gallop or ectopy  Pulmonary-no wheezing rales or rhonchi  Peripheral vascular mildly reduced vibratory but otherwise no motor or other sensory deficits vascular tone is normal  Skin-no rash petechiae or jaundice  Neurologic-cranial nerves are intact and no focal deficit the upper and lower extremities      Assessment/Plan   Problem List Items Addressed This Visit       Type 2 diabetes mellitus treated without insulin (CMS/[facility])    HTN (hypertension)    Dyslipidemia   Happy significant improvement of his A1c down to 6.9 with the addition of Ozempic to his improved diet and Amaryl.  We will continue this and bring in his Accu-Cheks at the right around the holidays.  Otherwise continue his statin therapy for dyslipidemia and his low-dose metoprolol for hypertension and above lab reviewed excellent LDL and will continue nightly foot examination and annual eye exam.  Call if interval problems  @discharge  The above diagnosis and treatment plan was discussed with the patient patient will continue appropriate diet and exercise as reviewed  Patient will recheck earlier if any interval problems of significance or clinical worsening of the above problems.  Agrees above surveillance.  All question were addressed regarding above meds

## 2023-11-09 ASSESSMENT — ENCOUNTER SYMPTOMS
LIGHT-HEADEDNESS: 0
WEAKNESS: 0
ARTHRALGIAS: 0
HEADACHES: 0
DYSURIA: 0
FATIGUE: 0
PALPITATIONS: 0
NUMBNESS: 1
HEMATURIA: 0
FEVER: 0
MYALGIAS: 1
ABDOMINAL PAIN: 0
SLEEP DISTURBANCE: 0
COUGH: 0
FREQUENCY: 1
BLOOD IN STOOL: 0
SHORTNESS OF BREATH: 0
CHEST TIGHTNESS: 0

## 2023-11-20 NOTE — PREPROCEDURE INSTRUCTIONS
Reviewed medical history and current medications. Holding ASA. NPO after midnight. Patient verbalized understanding.

## 2023-11-26 ENCOUNTER — PREP FOR PROCEDURE (OUTPATIENT)
Dept: SURGERY | Facility: HOSPITAL | Age: 43
End: 2023-11-26
Payer: COMMERCIAL

## 2023-11-26 RX ORDER — HEPARIN SODIUM 5000 [USP'U]/ML
5000 INJECTION, SOLUTION INTRAVENOUS; SUBCUTANEOUS ONCE
Status: CANCELLED | OUTPATIENT
Start: 2023-11-26 | End: 2023-11-26

## 2023-11-27 ENCOUNTER — HOSPITAL ENCOUNTER (OUTPATIENT)
Facility: HOSPITAL | Age: 43
Setting detail: OUTPATIENT SURGERY
Discharge: HOME | End: 2023-11-27
Attending: SURGERY | Admitting: SURGERY
Payer: COMMERCIAL

## 2023-11-27 ENCOUNTER — ANESTHESIA EVENT (OUTPATIENT)
Dept: OPERATING ROOM | Facility: HOSPITAL | Age: 43
End: 2023-11-27
Payer: COMMERCIAL

## 2023-11-27 ENCOUNTER — ANESTHESIA (OUTPATIENT)
Dept: OPERATING ROOM | Facility: HOSPITAL | Age: 43
End: 2023-11-27
Payer: COMMERCIAL

## 2023-11-27 VITALS
DIASTOLIC BLOOD PRESSURE: 85 MMHG | HEART RATE: 83 BPM | BODY MASS INDEX: 31.44 KG/M2 | RESPIRATION RATE: 18 BRPM | TEMPERATURE: 97.7 F | HEIGHT: 69 IN | OXYGEN SATURATION: 95 % | SYSTOLIC BLOOD PRESSURE: 139 MMHG | WEIGHT: 212.3 LBS

## 2023-11-27 DIAGNOSIS — E11.9 TYPE 2 DIABETES MELLITUS TREATED WITHOUT INSULIN (MULTI): ICD-10-CM

## 2023-11-27 DIAGNOSIS — K40.20 NON-RECURRENT BILATERAL INGUINAL HERNIA WITHOUT OBSTRUCTION OR GANGRENE: Primary | ICD-10-CM

## 2023-11-27 LAB
GLUCOSE BLD MANUAL STRIP-MCNC: 152 MG/DL (ref 74–99)
GLUCOSE BLD MANUAL STRIP-MCNC: 172 MG/DL (ref 74–99)

## 2023-11-27 PROCEDURE — 2500000004 HC RX 250 GENERAL PHARMACY W/ HCPCS (ALT 636 FOR OP/ED): Performed by: SURGERY

## 2023-11-27 PROCEDURE — 3600000008 HC OR TIME - EACH INCREMENTAL 1 MINUTE - PROCEDURE LEVEL THREE: Performed by: SURGERY

## 2023-11-27 PROCEDURE — 2500000004 HC RX 250 GENERAL PHARMACY W/ HCPCS (ALT 636 FOR OP/ED): Performed by: ANESTHESIOLOGY

## 2023-11-27 PROCEDURE — 96372 THER/PROPH/DIAG INJ SC/IM: CPT | Mod: 59 | Performed by: SURGERY

## 2023-11-27 PROCEDURE — 7100000002 HC RECOVERY ROOM TIME - EACH INCREMENTAL 1 MINUTE: Performed by: SURGERY

## 2023-11-27 PROCEDURE — 49650 LAP ING HERNIA REPAIR INIT: CPT | Performed by: SURGERY

## 2023-11-27 PROCEDURE — 2500000004 HC RX 250 GENERAL PHARMACY W/ HCPCS (ALT 636 FOR OP/ED): Performed by: NURSE ANESTHETIST, CERTIFIED REGISTERED

## 2023-11-27 PROCEDURE — 49591 RPR AA HRN 1ST < 3 CM RDC: CPT | Performed by: SURGERY

## 2023-11-27 PROCEDURE — 2500000001 HC RX 250 WO HCPCS SELF ADMINISTERED DRUGS (ALT 637 FOR MEDICARE OP): Performed by: ANESTHESIOLOGY

## 2023-11-27 PROCEDURE — 3700000002 HC GENERAL ANESTHESIA TIME - EACH INCREMENTAL 1 MINUTE: Performed by: SURGERY

## 2023-11-27 PROCEDURE — 3700000001 HC GENERAL ANESTHESIA TIME - INITIAL BASE CHARGE: Performed by: SURGERY

## 2023-11-27 PROCEDURE — A4217 STERILE WATER/SALINE, 500 ML: HCPCS | Performed by: SURGERY

## 2023-11-27 PROCEDURE — C1781 MESH (IMPLANTABLE): HCPCS | Performed by: SURGERY

## 2023-11-27 PROCEDURE — 82947 ASSAY GLUCOSE BLOOD QUANT: CPT

## 2023-11-27 PROCEDURE — 3600000003 HC OR TIME - INITIAL BASE CHARGE - PROCEDURE LEVEL THREE: Performed by: SURGERY

## 2023-11-27 PROCEDURE — 2780000003 HC OR 278 NO HCPCS: Performed by: SURGERY

## 2023-11-27 PROCEDURE — 7100000010 HC PHASE TWO TIME - EACH INCREMENTAL 1 MINUTE: Performed by: SURGERY

## 2023-11-27 PROCEDURE — 7100000009 HC PHASE TWO TIME - INITIAL BASE CHARGE: Performed by: SURGERY

## 2023-11-27 PROCEDURE — 2500000005 HC RX 250 GENERAL PHARMACY W/O HCPCS: Performed by: NURSE ANESTHETIST, CERTIFIED REGISTERED

## 2023-11-27 PROCEDURE — 7100000001 HC RECOVERY ROOM TIME - INITIAL BASE CHARGE: Performed by: SURGERY

## 2023-11-27 PROCEDURE — 2500000005 HC RX 250 GENERAL PHARMACY W/O HCPCS: Performed by: SURGERY

## 2023-11-27 PROCEDURE — 2720000007 HC OR 272 NO HCPCS: Performed by: SURGERY

## 2023-11-27 DEVICE — VENTRALEX HERNIA PATCH, 6.4 CM (2.5"), MEDIUM CIRCLE WITH STRAP
Type: IMPLANTABLE DEVICE | Site: UMBILICAL | Status: FUNCTIONAL
Brand: VENTRALEX

## 2023-11-27 DEVICE — 3DMAX LIGHT MESH, RIGHT, LARGE
Type: IMPLANTABLE DEVICE | Site: INGUINAL | Status: FUNCTIONAL
Brand: 3DMAX LIGHT MESH

## 2023-11-27 DEVICE — 3DMAX LIGHT MESH, LEFT, LARGE
Type: IMPLANTABLE DEVICE | Site: INGUINAL | Status: FUNCTIONAL
Brand: 3DMAX LIGHT MESH

## 2023-11-27 RX ORDER — LIDOCAINE HYDROCHLORIDE 20 MG/ML
INJECTION, SOLUTION INFILTRATION; PERINEURAL AS NEEDED
Status: DISCONTINUED | OUTPATIENT
Start: 2023-11-27 | End: 2023-11-27

## 2023-11-27 RX ORDER — SODIUM CHLORIDE 0.9 G/100ML
IRRIGANT IRRIGATION AS NEEDED
Status: DISCONTINUED | OUTPATIENT
Start: 2023-11-27 | End: 2023-11-27 | Stop reason: HOSPADM

## 2023-11-27 RX ORDER — HEPARIN SODIUM 5000 [USP'U]/ML
5000 INJECTION, SOLUTION INTRAVENOUS; SUBCUTANEOUS ONCE
Status: COMPLETED | OUTPATIENT
Start: 2023-11-27 | End: 2023-11-27

## 2023-11-27 RX ORDER — OXYCODONE HYDROCHLORIDE 5 MG/1
5 TABLET ORAL EVERY 4 HOURS PRN
Status: DISCONTINUED | OUTPATIENT
Start: 2023-11-27 | End: 2023-11-27 | Stop reason: HOSPADM

## 2023-11-27 RX ORDER — BUPIVACAINE HCL/EPINEPHRINE 0.25-.0005
VIAL (ML) INJECTION AS NEEDED
Status: DISCONTINUED | OUTPATIENT
Start: 2023-11-27 | End: 2023-11-27 | Stop reason: HOSPADM

## 2023-11-27 RX ORDER — SODIUM CHLORIDE, SODIUM LACTATE, POTASSIUM CHLORIDE, CALCIUM CHLORIDE 600; 310; 30; 20 MG/100ML; MG/100ML; MG/100ML; MG/100ML
125 INJECTION, SOLUTION INTRAVENOUS CONTINUOUS
Status: DISCONTINUED | OUTPATIENT
Start: 2023-11-27 | End: 2023-11-27 | Stop reason: HOSPADM

## 2023-11-27 RX ORDER — GABAPENTIN 300 MG/1
300 CAPSULE ORAL 3 TIMES DAILY PRN
Qty: 20 CAPSULE | Refills: 0 | Status: SHIPPED | OUTPATIENT
Start: 2023-11-27 | End: 2024-02-04 | Stop reason: WASHOUT

## 2023-11-27 RX ORDER — MIDAZOLAM HYDROCHLORIDE 1 MG/ML
INJECTION, SOLUTION INTRAMUSCULAR; INTRAVENOUS AS NEEDED
Status: DISCONTINUED | OUTPATIENT
Start: 2023-11-27 | End: 2023-11-27

## 2023-11-27 RX ORDER — ONDANSETRON HYDROCHLORIDE 2 MG/ML
INJECTION, SOLUTION INTRAVENOUS AS NEEDED
Status: DISCONTINUED | OUTPATIENT
Start: 2023-11-27 | End: 2023-11-27

## 2023-11-27 RX ORDER — DROPERIDOL 2.5 MG/ML
0.62 INJECTION, SOLUTION INTRAMUSCULAR; INTRAVENOUS ONCE AS NEEDED
Status: DISCONTINUED | OUTPATIENT
Start: 2023-11-27 | End: 2023-11-27 | Stop reason: HOSPADM

## 2023-11-27 RX ORDER — MEPERIDINE HYDROCHLORIDE 25 MG/ML
12.5 INJECTION INTRAMUSCULAR; INTRAVENOUS; SUBCUTANEOUS EVERY 10 MIN PRN
Status: DISCONTINUED | OUTPATIENT
Start: 2023-11-27 | End: 2023-11-27 | Stop reason: HOSPADM

## 2023-11-27 RX ORDER — ALBUTEROL SULFATE 0.83 MG/ML
2.5 SOLUTION RESPIRATORY (INHALATION) ONCE
Status: DISCONTINUED | OUTPATIENT
Start: 2023-11-27 | End: 2023-11-27 | Stop reason: HOSPADM

## 2023-11-27 RX ORDER — HYDROCODONE BITARTRATE AND ACETAMINOPHEN 5; 325 MG/1; MG/1
1 TABLET ORAL EVERY 6 HOURS PRN
Qty: 12 TABLET | Refills: 0 | Status: SHIPPED | OUTPATIENT
Start: 2023-11-27 | End: 2023-12-04

## 2023-11-27 RX ORDER — LABETALOL HYDROCHLORIDE 5 MG/ML
5 INJECTION, SOLUTION INTRAVENOUS ONCE AS NEEDED
Status: DISCONTINUED | OUTPATIENT
Start: 2023-11-27 | End: 2023-11-27 | Stop reason: HOSPADM

## 2023-11-27 RX ORDER — DIPHENHYDRAMINE HYDROCHLORIDE 50 MG/ML
INJECTION INTRAMUSCULAR; INTRAVENOUS AS NEEDED
Status: DISCONTINUED | OUTPATIENT
Start: 2023-11-27 | End: 2023-11-27

## 2023-11-27 RX ORDER — MIDAZOLAM HYDROCHLORIDE 1 MG/ML
1 INJECTION, SOLUTION INTRAMUSCULAR; INTRAVENOUS ONCE AS NEEDED
Status: DISCONTINUED | OUTPATIENT
Start: 2023-11-27 | End: 2023-11-27 | Stop reason: HOSPADM

## 2023-11-27 RX ORDER — CEFAZOLIN SODIUM 2 G/100ML
2 INJECTION, SOLUTION INTRAVENOUS ONCE
Status: COMPLETED | OUTPATIENT
Start: 2023-11-27 | End: 2023-11-27

## 2023-11-27 RX ORDER — PROPOFOL 10 MG/ML
INJECTION, EMULSION INTRAVENOUS AS NEEDED
Status: DISCONTINUED | OUTPATIENT
Start: 2023-11-27 | End: 2023-11-27

## 2023-11-27 RX ORDER — SODIUM CHLORIDE, SODIUM LACTATE, POTASSIUM CHLORIDE, CALCIUM CHLORIDE 600; 310; 30; 20 MG/100ML; MG/100ML; MG/100ML; MG/100ML
100 INJECTION, SOLUTION INTRAVENOUS CONTINUOUS
Status: DISCONTINUED | OUTPATIENT
Start: 2023-11-27 | End: 2023-11-27 | Stop reason: HOSPADM

## 2023-11-27 RX ORDER — LIDOCAINE HYDROCHLORIDE 10 MG/ML
0.1 INJECTION, SOLUTION EPIDURAL; INFILTRATION; INTRACAUDAL; PERINEURAL ONCE
Status: DISCONTINUED | OUTPATIENT
Start: 2023-11-27 | End: 2023-11-27 | Stop reason: HOSPADM

## 2023-11-27 RX ORDER — DEXAMETHASONE SODIUM PHOSPHATE 4 MG/ML
INJECTION, SOLUTION INTRA-ARTICULAR; INTRALESIONAL; INTRAMUSCULAR; INTRAVENOUS; SOFT TISSUE AS NEEDED
Status: DISCONTINUED | OUTPATIENT
Start: 2023-11-27 | End: 2023-11-27

## 2023-11-27 RX ORDER — FENTANYL CITRATE 50 UG/ML
INJECTION, SOLUTION INTRAMUSCULAR; INTRAVENOUS AS NEEDED
Status: DISCONTINUED | OUTPATIENT
Start: 2023-11-27 | End: 2023-11-27

## 2023-11-27 RX ORDER — ROCURONIUM BROMIDE 10 MG/ML
INJECTION, SOLUTION INTRAVENOUS AS NEEDED
Status: DISCONTINUED | OUTPATIENT
Start: 2023-11-27 | End: 2023-11-27

## 2023-11-27 RX ADMIN — FENTANYL CITRATE 75 MCG: 50 INJECTION, SOLUTION INTRAMUSCULAR; INTRAVENOUS at 10:03

## 2023-11-27 RX ADMIN — ROCURONIUM BROMIDE 20 MG: 10 SOLUTION INTRAVENOUS at 12:17

## 2023-11-27 RX ADMIN — SUGAMMADEX 200 MG: 100 INJECTION, SOLUTION INTRAVENOUS at 14:07

## 2023-11-27 RX ADMIN — ROCURONIUM BROMIDE 20 MG: 10 SOLUTION INTRAVENOUS at 11:29

## 2023-11-27 RX ADMIN — PROPOFOL 200 MG: 10 INJECTION, EMULSION INTRAVENOUS at 10:04

## 2023-11-27 RX ADMIN — ROCURONIUM BROMIDE 10 MG: 10 SOLUTION INTRAVENOUS at 12:45

## 2023-11-27 RX ADMIN — MIDAZOLAM 2 MG: 1 INJECTION INTRAMUSCULAR; INTRAVENOUS at 09:58

## 2023-11-27 RX ADMIN — HEPARIN SODIUM 5000 UNITS: 5000 INJECTION INTRAVENOUS; SUBCUTANEOUS at 08:35

## 2023-11-27 RX ADMIN — SODIUM CHLORIDE, SODIUM LACTATE, POTASSIUM CHLORIDE, AND CALCIUM CHLORIDE 125 ML/HR: 600; 310; 30; 20 INJECTION, SOLUTION INTRAVENOUS at 08:35

## 2023-11-27 RX ADMIN — ROCURONIUM BROMIDE 70 MG: 10 SOLUTION INTRAVENOUS at 10:05

## 2023-11-27 RX ADMIN — ROCURONIUM BROMIDE 10 MG: 10 SOLUTION INTRAVENOUS at 11:59

## 2023-11-27 RX ADMIN — FENTANYL CITRATE 25 MCG: 50 INJECTION, SOLUTION INTRAMUSCULAR; INTRAVENOUS at 10:48

## 2023-11-27 RX ADMIN — SODIUM CHLORIDE, SODIUM LACTATE, POTASSIUM CHLORIDE, AND CALCIUM CHLORIDE: 600; 310; 30; 20 INJECTION, SOLUTION INTRAVENOUS at 11:49

## 2023-11-27 RX ADMIN — FENTANYL CITRATE 25 MCG: 50 INJECTION, SOLUTION INTRAMUSCULAR; INTRAVENOUS at 14:03

## 2023-11-27 RX ADMIN — OXYCODONE HYDROCHLORIDE 5 MG: 5 TABLET ORAL at 15:47

## 2023-11-27 RX ADMIN — DIPHENHYDRAMINE HYDROCHLORIDE 12.5 MG: 50 INJECTION, SOLUTION INTRAMUSCULAR; INTRAVENOUS at 10:22

## 2023-11-27 RX ADMIN — ROCURONIUM BROMIDE 10 MG: 10 SOLUTION INTRAVENOUS at 10:43

## 2023-11-27 RX ADMIN — ROCURONIUM BROMIDE 20 MG: 10 SOLUTION INTRAVENOUS at 13:11

## 2023-11-27 RX ADMIN — LIDOCAINE HYDROCHLORIDE 100 MG: 20 INJECTION, SOLUTION INFILTRATION; PERINEURAL at 10:04

## 2023-11-27 RX ADMIN — ONDANSETRON 4 MG: 2 INJECTION INTRAMUSCULAR; INTRAVENOUS at 13:31

## 2023-11-27 RX ADMIN — CEFAZOLIN SODIUM 2 G: 2 INJECTION, SOLUTION INTRAVENOUS at 10:11

## 2023-11-27 RX ADMIN — DEXAMETHASONE SODIUM PHOSPHATE 4 MG: 4 INJECTION, SOLUTION INTRAMUSCULAR; INTRAVENOUS at 10:19

## 2023-11-27 SDOH — HEALTH STABILITY: MENTAL HEALTH: CURRENT SMOKER: 0

## 2023-11-27 ASSESSMENT — PAIN SCALES - GENERAL
PAINLEVEL_OUTOF10: 0 - NO PAIN
PAINLEVEL_OUTOF10: 3
PAINLEVEL_OUTOF10: 0 - NO PAIN
PAINLEVEL_OUTOF10: 3
PAINLEVEL_OUTOF10: 4
PAIN_LEVEL: 0
PAINLEVEL_OUTOF10: 3

## 2023-11-27 ASSESSMENT — PAIN - FUNCTIONAL ASSESSMENT
PAIN_FUNCTIONAL_ASSESSMENT: 0-10

## 2023-11-27 ASSESSMENT — COLUMBIA-SUICIDE SEVERITY RATING SCALE - C-SSRS
2. HAVE YOU ACTUALLY HAD ANY THOUGHTS OF KILLING YOURSELF?: NO
1. IN THE PAST MONTH, HAVE YOU WISHED YOU WERE DEAD OR WISHED YOU COULD GO TO SLEEP AND NOT WAKE UP?: NO
6. HAVE YOU EVER DONE ANYTHING, STARTED TO DO ANYTHING, OR PREPARED TO DO ANYTHING TO END YOUR LIFE?: NO

## 2023-11-27 ASSESSMENT — PAIN DESCRIPTION - DESCRIPTORS: DESCRIPTORS: BURNING;HEAVINESS

## 2023-11-27 NOTE — DISCHARGE INSTRUCTIONS
General Anesthesia Discharge Instructions    About this topic  You may need general anesthesia if you need to be asleep during a procedure. Your doctor will use drugs to block the signals that go from your nerves to your brain. Doctors give general anesthesia during a surgery or procedure to:  Allow you to sleep  Help your body be still  Relax your muscles  Help you to relax and be pain free  Keep you from remembering the surgery  Let the doctor manage your airway, breathing, and blood flow  The doctor or nurse anesthetist gives general anesthesia by a shot into your vein. Sometimes, you may breathe in a gas through a mask placed over your face.  What care is needed at home?  Ask your doctor what you need to do when you go home. Make sure you ask questions if you do not understand what the doctor says.  Your doctor may give you drugs to prevent or treat an upset stomach from the anesthetic. Take them as ordered.  If your throat is sore, suck on ice chips or popsicles to ease throat pain.  Put 2 to 3 pillows under your head and back when you lie down to help you breathe easier.  For the first 24 to 48 hours:  Do not operate heavy or dangerous machinery.  Do not make major decisions or sign important papers. You may not be able to think clearly.  Avoid beer, wine, or mixed drinks.  You are at a higher risk of falling for at least 24 hours after general anesthesia.  Take extra care when you get up.  Do not change positions quickly.  Do not rush when you need to go to the bathroom or to answer the phone.  Ask for help if you feel unsteady when you try to walk.  Wear shoes with non-slip soles and low heels.  What follow-up care is needed?  Your doctor may ask you to come back to the office to check on your progress. Be sure to keep these visits.  If you have stitches that do not dissolve or staples, you will need to have them removed. Your doctor will want to do this in 1 to 2 weeks. If the doctor used skin glue, the  glue will fall off on its own.  What drugs may be needed?  The doctor may order drugs to:  Help with pain  Treat an upset stomach or throwing up  Will physical activity be limited?  You will not be allowed to drive right away after the procedure. Ask a family member or a friend to drive you home.  Avoid trying to get out of bed without help until you are sure of your balance.  You may have to limit your activity. Talk to your doctor about if you need to limit how much you lift or limit exercise after your procedure.  What changes to diet are needed?  Start with a light diet when you are fully awake. This includes things that are easy to swallow like soups, pudding, jello, toast, and eggs. Slowly progress to your normal diet.  What problems could happen?  Low blood pressure  Breathing problems  Upset stomach or throwing up  Dizziness  Blood clots  Infection  When do I need to call the doctor?  Trouble breathing  Upset stomach or throwing up more than 3 times in the next 2 days  Dizziness  Teach Back: Helping You Understand  The Teach Back Method helps you understand the information we are giving you. After you talk with the staff, tell them in your own words what you learned. This helps to make sure the staff has described each thing clearly. It also helps to explain things that may have been confusing. Before going home, make sure you can do these:  I can tell you about my procedure.  I can tell you if I need to follow up with my doctor.  I can tell you what is good for me to eat and drink the next day.  I can tell you what I would do if I have trouble breathing, an upset stomach, or dizziness.  Where can I learn more?  [employer]  https://www.nigms.nih.gov/education/pages/factsheet_Anesthesia.aspx  N[facility] Choices  http://www.nhs.uk/conditions/Anaesthetic-general/Pages/Definition.aspx  Last Reviewed Date  2020-04-22

## 2023-11-27 NOTE — ANESTHESIA POSTPROCEDURE EVALUATION
Patient: Montana Kern    Procedure Summary       Date: 11/27/23 Room / Location: [facility] / [facility]    Anesthesia Start: 0958 Anesthesia Stop: 1420    Procedures:       OPEN UMBILICAL HERNIA REPAIR (Abdomen)      LAPAROSCOPIC BILATERAL HERNIA REPAIR W/MESH  (INGUINAL) (Bilateral: Groin) Diagnosis:       Non-recurrent bilateral inguinal hernia without obstruction or gangrene      Umbilical hernia without obstruction and without gangrene      (UMBILICAL AND BILATERAL INGUINAL HERNIA)    Surgeons: Giorgio PLASENCIA MD Responsible Provider: Jose Smith MD    Anesthesia Type: general ASA Status: 2            Anesthesia Type: general    Vitals Value Taken Time   /95 11/27/23 1415   Temp 36.4 11/27/23 1420   Pulse 82 11/27/23 1419   Resp 21 11/27/23 1419   SpO2 100 % 11/27/23 1419   Vitals shown include unvalidated device data.    Anesthesia Post Evaluation    Patient location during evaluation: PACU  Patient participation: waiting for patient participation  Level of consciousness: responsive to verbal stimuli  Pain score: 0  Pain management: adequate  There was medical reason for not using a multimodal analgesia pain management approach.Airway patency: patent  There was medical reason for not screening for obstructive sleep apnea and/or not using of two or more mitigation strategies.Cardiovascular status: hemodynamically stable  Respiratory status: nonlabored ventilation, spontaneous ventilation and face mask  Hydration status: euvolemic  Postoperative Nausea and Vomiting: none        There were no known notable events for this encounter.

## 2023-11-27 NOTE — ANESTHESIA PROCEDURE NOTES
Airway  Date/Time: 11/27/2023 10:07 AM  Urgency: elective    Airway not difficult    Staffing  Performed: CRNA   Authorized by: Jose Smith MD    Performed by: RICKY Easley  Patient location during procedure: OR    Indications and Patient Condition  Indications for airway management: anesthesia  Spontaneous Ventilation: absent  Sedation level: deep  Preoxygenated: yes  Patient position: sniffing  MILS not maintained throughout  Mask difficulty assessment: 2 - vent by mask + OA or adjuvant +/- NMBA  No planned trial extubation    Final Airway Details  Final airway type: endotracheal airway      Successful airway: ETT  Cuffed: yes   Successful intubation technique: video laryngoscopy  Facilitating devices/methods: intubating stylet  Endotracheal tube insertion site: oral  Blade: Simeon (Miranda laryngoscope)  Blade size: #4  ETT size (mm): 7.5  Cormack-Lehane Classification: grade I - full view of glottis  Placement verified by: chest auscultation and capnometry   Measured from: lips  ETT to lips (cm): 23  Number of attempts at approach: 1  Ventilation between attempts: none  Number of other approaches attempted: 0

## 2023-11-27 NOTE — ANESTHESIA PREPROCEDURE EVALUATION
Patient: Montana Kern    Procedure Information       Date/Time: 11/27/23 4934    Procedures:       OPEN UMBILICAL HERNIA REPAIR (Abdomen)      LAPAROSCOPIC BILATERAL HERNIA REPAIR W/MESH POSS OPEN  (INGUINAL) DIABETIC (Bilateral)    Location: [facility]    Surgeons: Giorgio PLASENCIA MD            Relevant Problems   Anesthesia (within normal limits)      Cardiovascular   (+) HTN (hypertension)      Endocrine   (+) Type 2 diabetes mellitus treated without insulin (CMS/HCC)      GI (within normal limits)      /Renal (within normal limits)      Neuro/Psych (within normal limits)      Pulmonary (within normal limits)      GI/Hepatic (within normal limits)      Hematology (within normal limits)      Musculoskeletal (within normal limits)      Eyes, Ears, Nose, and Throat (within normal limits)      Infectious Disease (within normal limits)       Clinical information reviewed:   Tobacco  Allergies  Meds   Med Hx  Surg Hx   Fam Hx  Soc Hx        NPO Detail:  No data recorded     Physical Exam    Airway  Mallampati: II     Cardiovascular - normal exam  Rhythm: regular     Dental - normal exam     Pulmonary - normal exam     Abdominal - normal exam       Other findings: Facial hair.          Anesthesia Plan    ASA 2     general     The patient is not a current smoker.  Patient was not previously instructed to abstain from smoking on day of procedure.  Patient did not smoke on day of procedure.    intravenous induction   Anesthetic plan and risks discussed with patient.    Plan discussed with CRNA.

## 2023-11-27 NOTE — OP NOTE
OPEN UMBILICAL HERNIA REPAIR, LAPAROSCOPIC BILATERAL HERNIA REPAIR W/MESH  (INGUINAL) (B) Operative Note     Date: 2023  OR Location: [facility]    Name: Montana Kern, : 1980, Age: 43 y.o., MRN: [id number], Sex: male    Diagnosis  Bilateral inguinal hernia, umbilical hernia Post-op Diagnosis     * Non-recurrent bilateral inguinal hernia without obstruction or gangrene [K40.20]     * Umbilical hernia without obstruction and without gangrene [K42.9]     Procedures  OPEN UMBILICAL HERNIA REPAIR  41396 - [place] RPR AA HERNIA 1ST < 3 CM REDUCIBLE    LAPAROSCOPIC BILATERAL HERNIA REPAIR W/MESH  (INGUINAL)  14456 - [place] LAPAROSCOPY SURG RPR INITIAL INGUINAL HERNIA      Surgeons      * Giorgio Pimentel V - Primary    Resident/Fellow/Other Assistant:  Surgeon(s) and Role:  Shayne Mota  Procedure Summary  Anesthesia: General  ASA: II  Anesthesia Staff: Anesthesiologist: Jose Smith MD  CRNA: JONH Easley-CRNA  Estimated Blood Loss: Less than 10 cc mL  Intra-op Medications:   Medication Name Total Dose   sodium chloride 0.9 % irrigation solution 3,000 mL   lactated Ringer's infusion 9.17 mL   ceFAZolin in dextrose (iso-os) (Ancef) IVPB 2 g 2 g              Anesthesia Record               Intraprocedure I/O Totals          Intake    Propofol Drip 0.00 mL    The total shown is the total volume documented since Anesthesia Start was filed.    lactated Ringer's infusion 1000.00 mL    Total Intake 1000 mL          Specimen: No specimens collected     Staff:   Circulator: Porsha Castillo RN  Relief Circulator: Susan Wong RN  Relief Scrub: Louie Ibrahim  Scrub Person: Bree Heredia         Drains and/or Catheters:   Urethral Catheter Non-latex 16 Fr. (Active)       [REMOVED] NG/OG/Feeding Tube OG - Stanley sump 16 Fr Center mouth (Removed)       Tourniquet Times:         Implants:  Implants       Type Name Action Serial No.      Surgical Mesh Sling Implant MESH, 3 D LIGHT MAX, LARGE, RIGHT - [id number] Implanted      Surgical  Mesh Sling Implant MESH, 3 D LIGHT MAX, LARGE, LEFT - [id number] Implanted      Surgical Mesh Sling Implant PATCH, VENTRALEX 6.4 X 6.4 - [id number] Implanted               Findings: Bilateral inguinal hernia left greater than right, 3 x 3 cm umbilical hernia     indications: Montana Kern is an 43 y.o. male who is having surgery for UMBILICAL AND BILATERAL INGUINAL HERNIA.     The patient was seen in the preoperative area. The risks, benefits, complications, treatment options, non-operative alternatives, expected recovery and outcomes were discussed with the patient. The possibilities of reaction to medication, pulmonary aspiration, injury to surrounding structures, bleeding, recurrent infection, the need for additional procedures, failure to diagnose a condition, and creating a complication requiring transfusion or operation were discussed with the patient. The patient concurred with the proposed plan, giving informed consent.  The site of surgery was properly noted/marked if necessary per policy. The patient has been actively warmed in preoperative area. Preoperative antibiotics have been ordered and given within 1 hours of incision. Venous thrombosis prophylaxis have been ordered including bilateral sequential compression devices and chemical prophylaxis    Procedure Details: Patient was brought to the OR laid supine.  Preoperative huddle was performed and all team members participated.  Patient was then placed under general anesthesia.  Abdomen genitalia were clipped free of hair, James catheter was placed in the abdomen was prepped and draped in standard surgical fashion including the groin and genitalia.  Timeout procedures were observed to elected to proceed at this time    Local anesthetic was instilled just below the umbilicus curvilinear subumbilical incision made.  Wound was mobilized to the left anterior rectus sheath was incised rectus muscle was split and the preperitoneal space was developed under  laparoscopic guidance utilizing the bilateral dissecting balloon.  Balloon was removed 12 mm balloon port was placed gas was insufflated.  Two 5 mm ports were placed in the lower midline both under direct vision.  The left side was approached first.  The epigastric vessels were dissected out clipped and divided as they were in the way of the dissection there was no bleeding.  Patient had a large amount of preperitoneal fat this was completely dissected down tediously using two-handed technique and the sac divided close to the internal ring.  Lateral dissection was performed meticulously to allow for mesh to lay flat.  Medially the Dilan's ligament was exposed and dissection continued below the pubic symphysis for approximately 3 cm.  Minor bleeding was controlled with gauze pressure and careful application of cautery.    The right side was then approached the hernia sac was dissected as was the cord structures and sac was reduced down to the level of the psoas muscle.  Epigastrics were spared on the right side the dissection connected to the left side and the myopectineal orifice on both sides were widely exposed.  Laterally the peritoneum was divided back to allow the mesh to lay flat.    Left large 3D max mesh light weight was placed to the left side a 5 mm port was placed in the right lower quadrant under direct vision and it was tacked to Dilan's x 2 anteriorly to the rectus and laterally at a palpable portion above the left inguinal canal.    The right-sided mesh was introduced and 2 tacks were used to connect the 2 meshes medially laterally tacks were placed and 2 on Dilan's ligament on the right side.  Good repair was affected gas was released    Anterior rectus sheath was repaired the umbilical defect was carefully defined sac was opened and all intra-abdominal gas was vented.  The hernia defect measured 3 x 3 cm a 6.4 cm² Ventralex with strap was deployed intraperitoneally and secured using 0 Prolene x 2  in a U-shaped fashion mesh was stable wound was irrigated fascia was closed using interrupted 0 Prolene incisions were closed with 3-0 Vicryl for Monocryl and skin adhesive patient tolerated procedure well discussed with wife in detail      Complications:  None; patient tolerated the procedure well.    Disposition: PACU - hemodynamically stable.  Condition: stable         Additional Details:     Attending Attestation:     Giorgio Pimentel V  Phone Number: [phone]

## 2023-11-27 NOTE — H&P
History Of Present Illness  Montana Kern is a 43 y.o. male presenting with umbilical hernia and bilateral inguinal hernia.     Past Medical History  Past Medical History:   Diagnosis Date    Acute bronchitis due to other specified organisms 11/26/2020    Acute bronchitis due to other specified organisms    Asthma     Diabetes (CMS/[facility])     type 2    Hyperlipidemia     Hypertension     Kidney stones     passed on own    Osteoarthritis     ankles    Personal history of other specified conditions 11/26/2020    History of persistent cough       Surgical History  Past Surgical History:   Procedure Laterality Date    MULTIPLE TOOTH EXTRACTIONS      VASECTOMY          Social History  He reports that he has never smoked. He has never used smokeless tobacco. He reports current alcohol use. He reports that he does not use drugs.    Family History  Family History   Problem Relation Name Age of Onset    Stroke Father      Heart attack Father          Allergies  Patient has no known allergies.    Review of Systems   All other systems reviewed and are negative.       Physical Exam  Abdominal:      Comments: Umbilical hernia reducible bilateral inguinal hernias   Neurological:      Coordination: Coordination normal.        Physical Exam  Constitutional:       Appearance: Normal appearance.   HENT:      Head: Normocephalic and atraumatic.      Mouth/Throat:      Pharynx: Oropharynx is clear.   Eyes:      Pupils: Pupils are equal, round, and reactive to light.   Cardiovascular:      Rate and Rhythm: Normal rate and regular rhythm.   Pulmonary:      Effort: Pulmonary effort is normal.      Breath sounds: Normal breath sounds.   Abdominal:      General: Abdomen is flat. Bowel sounds are normal.      Palpations: Abdomen is soft.   Musculoskeletal:         General: Normal range of motion.      Cervical back: Normal range of motion.   Skin:     General: Skin is warm.   Neurological:      General: No focal deficit present.      Mental  "Status: She is alert. Mental status is at baseline.   Psychiatric:         Mood and Affect: Mood normal.    Last Recorded Vitals  Blood pressure (!) 152/102, pulse 66, temperature 36.2 °C (97.2 °F), temperature source Temporal, resp. rate 18, height 1.753 m (5' 9\"), weight 96.3 kg (212 lb 4.9 oz), SpO2 98 %.    Relevant Results             Assessment/Plan   Active Problems:  There are no active Hospital Problems.      Bilateral inguinal hernias umbilical hernia repair possible open discussed with patient and family       I spent  minutes in the professional and overall care of this patient.      Giorgio Pimentel MD    "

## 2023-12-07 ENCOUNTER — OFFICE VISIT (OUTPATIENT)
Dept: SURGERY | Facility: CLINIC | Age: 43
End: 2023-12-07
Payer: COMMERCIAL

## 2023-12-07 ENCOUNTER — TELEPHONE (OUTPATIENT)
Dept: PRIMARY CARE | Facility: CLINIC | Age: 43
End: 2023-12-07

## 2023-12-07 VITALS — SYSTOLIC BLOOD PRESSURE: 141 MMHG | DIASTOLIC BLOOD PRESSURE: 97 MMHG | HEART RATE: 73 BPM

## 2023-12-07 DIAGNOSIS — K40.20 NON-RECURRENT BILATERAL INGUINAL HERNIA WITHOUT OBSTRUCTION OR GANGRENE: Primary | ICD-10-CM

## 2023-12-07 DIAGNOSIS — K42.9 UMBILICAL HERNIA WITHOUT OBSTRUCTION AND WITHOUT GANGRENE: ICD-10-CM

## 2023-12-07 PROCEDURE — 3044F HG A1C LEVEL LT 7.0%: CPT | Performed by: SURGERY

## 2023-12-07 PROCEDURE — 1036F TOBACCO NON-USER: CPT | Performed by: SURGERY

## 2023-12-07 PROCEDURE — 3077F SYST BP >= 140 MM HG: CPT | Performed by: SURGERY

## 2023-12-07 PROCEDURE — 3080F DIAST BP >= 90 MM HG: CPT | Performed by: SURGERY

## 2023-12-07 PROCEDURE — 4010F ACE/ARB THERAPY RXD/TAKEN: CPT | Performed by: SURGERY

## 2023-12-07 PROCEDURE — 3048F LDL-C <100 MG/DL: CPT | Performed by: SURGERY

## 2023-12-07 PROCEDURE — 99024 POSTOP FOLLOW-UP VISIT: CPT | Performed by: SURGERY

## 2023-12-07 NOTE — TELEPHONE ENCOUNTER
Patient's wife called in stating a form is being faxed to the office. She stated the patient will no longer be donating and the form is to be disregarded

## 2023-12-07 NOTE — PROGRESS NOTES
Patient status post laparoscopic bilateral inguinal hernia repair with mesh and open umbilical hernia repair with mesh, feels better than preoperative state overall doing well        On exam incisions healed, small seromathe umbilical incision no early recurrence of the umbilicus or bilateral inguinal areas    Good well overall follow-up is needed increased activity slowly

## 2023-12-10 DIAGNOSIS — E78.5 DYSLIPIDEMIA: ICD-10-CM

## 2023-12-10 DIAGNOSIS — E11.9 TYPE 2 DIABETES MELLITUS TREATED WITHOUT INSULIN (MULTI): ICD-10-CM

## 2023-12-10 DIAGNOSIS — I10 HYPERTENSION, UNSPECIFIED TYPE: ICD-10-CM

## 2023-12-10 RX ORDER — VARDENAFIL HYDROCHLORIDE 10 MG/1
TABLET ORAL
COMMUNITY
Start: 2023-02-27

## 2023-12-10 RX ORDER — SEMAGLUTIDE 1.34 MG/ML
1 INJECTION, SOLUTION SUBCUTANEOUS
Qty: 9 ML | Refills: 0 | Status: SHIPPED | OUTPATIENT
Start: 2023-12-10 | End: 2024-04-19 | Stop reason: SDUPTHER

## 2023-12-10 NOTE — TELEPHONE ENCOUNTER
Rx Refill Request     Name: Montana Kern  :  1980   Medication Name:    semaglutide (Ozempic) 1 mg/dose (4 mg/3 mL) pen injector    Last written but not dispensed: 2023 9 mL 3 refills   Specific Pharmacy location:  [facility]   Date of last appointment: 2023  Date of next appointment: 2024  Best number to reach patient:  [phone]       RX PENDED to [facility] as directed by fax

## 2023-12-21 ENCOUNTER — LAB (OUTPATIENT)
Dept: LAB | Facility: LAB | Age: 43
End: 2023-12-21
Payer: COMMERCIAL

## 2023-12-21 ENCOUNTER — OFFICE VISIT (OUTPATIENT)
Dept: SURGERY | Facility: CLINIC | Age: 43
End: 2023-12-21
Payer: COMMERCIAL

## 2023-12-21 VITALS
BODY MASS INDEX: 31.31 KG/M2 | HEART RATE: 88 BPM | WEIGHT: 212 LBS | DIASTOLIC BLOOD PRESSURE: 91 MMHG | SYSTOLIC BLOOD PRESSURE: 138 MMHG

## 2023-12-21 DIAGNOSIS — K40.91 RECURRENT LEFT INGUINAL HERNIA: ICD-10-CM

## 2023-12-21 DIAGNOSIS — K40.91 RECURRENT LEFT INGUINAL HERNIA: Primary | ICD-10-CM

## 2023-12-21 LAB
CREAT SERPL-MCNC: 0.87 MG/DL (ref 0.5–1.3)
GFR SERPL CREATININE-BSD FRML MDRD: >90 ML/MIN/1.73M*2

## 2023-12-21 PROCEDURE — 3080F DIAST BP >= 90 MM HG: CPT | Performed by: SURGERY

## 2023-12-21 PROCEDURE — 3075F SYST BP GE 130 - 139MM HG: CPT | Performed by: SURGERY

## 2023-12-21 PROCEDURE — 3048F LDL-C <100 MG/DL: CPT | Performed by: SURGERY

## 2023-12-21 PROCEDURE — 1036F TOBACCO NON-USER: CPT | Performed by: SURGERY

## 2023-12-21 PROCEDURE — 36415 COLL VENOUS BLD VENIPUNCTURE: CPT

## 2023-12-21 PROCEDURE — 3044F HG A1C LEVEL LT 7.0%: CPT | Performed by: SURGERY

## 2023-12-21 PROCEDURE — 4010F ACE/ARB THERAPY RXD/TAKEN: CPT | Performed by: SURGERY

## 2023-12-21 PROCEDURE — 82565 ASSAY OF CREATININE: CPT

## 2023-12-21 PROCEDURE — 99024 POSTOP FOLLOW-UP VISIT: CPT | Performed by: SURGERY

## 2023-12-21 NOTE — PROGRESS NOTES
Patient status post laparoscopic bilateral inguinal hernia repair and umbilical hernia repair was doing well however has noticed increased pain and bulging left groin no burning pain      Incisions healthy no umbilical recurrence the right side hernia.  On the left side there is a moderate transmissible bulge with cough      Unclear whether there is a recurrence discussed with the patient recommend CT with follow-up

## 2023-12-22 ENCOUNTER — HOSPITAL ENCOUNTER (OUTPATIENT)
Dept: RADIOLOGY | Facility: HOSPITAL | Age: 43
Discharge: HOME | End: 2023-12-22
Payer: COMMERCIAL

## 2023-12-22 DIAGNOSIS — K40.91 RECURRENT LEFT INGUINAL HERNIA: ICD-10-CM

## 2023-12-22 PROCEDURE — 2550000001 HC RX 255 CONTRASTS: Performed by: SURGERY

## 2023-12-22 PROCEDURE — 74177 CT ABD & PELVIS W/CONTRAST: CPT | Mod: FR

## 2023-12-22 PROCEDURE — 74177 CT ABD & PELVIS W/CONTRAST: CPT | Mod: FOREIGN READ | Performed by: RADIOLOGY

## 2023-12-22 PROCEDURE — A9698 NON-RAD CONTRAST MATERIALNOC: HCPCS | Performed by: SURGERY

## 2023-12-22 RX ADMIN — IOHEXOL 75 ML: 350 INJECTION, SOLUTION INTRAVENOUS at 08:13

## 2023-12-22 RX ADMIN — IOHEXOL 500 ML: 12 SOLUTION ORAL at 07:35

## 2023-12-27 ENCOUNTER — APPOINTMENT (OUTPATIENT)
Dept: SURGERY | Facility: CLINIC | Age: 43
End: 2023-12-27
Payer: COMMERCIAL

## 2023-12-27 ENCOUNTER — OFFICE VISIT (OUTPATIENT)
Dept: SURGERY | Facility: CLINIC | Age: 43
End: 2023-12-27
Payer: COMMERCIAL

## 2023-12-27 VITALS — WEIGHT: 220 LBS | HEIGHT: 69 IN | BODY MASS INDEX: 32.58 KG/M2

## 2023-12-27 DIAGNOSIS — R10.32 LEFT GROIN PAIN: Primary | ICD-10-CM

## 2023-12-27 PROCEDURE — 3044F HG A1C LEVEL LT 7.0%: CPT | Performed by: SURGERY

## 2023-12-27 PROCEDURE — 99024 POSTOP FOLLOW-UP VISIT: CPT | Performed by: SURGERY

## 2023-12-27 PROCEDURE — 3048F LDL-C <100 MG/DL: CPT | Performed by: SURGERY

## 2023-12-27 PROCEDURE — 1036F TOBACCO NON-USER: CPT | Performed by: SURGERY

## 2023-12-27 PROCEDURE — 4010F ACE/ARB THERAPY RXD/TAKEN: CPT | Performed by: SURGERY

## 2023-12-27 RX ORDER — OXYCODONE AND ACETAMINOPHEN 5; 325 MG/1; MG/1
1 TABLET ORAL EVERY 6 HOURS PRN
Qty: 20 TABLET | Refills: 0 | Status: SHIPPED | OUTPATIENT
Start: 2023-12-27 | End: 2024-01-03

## 2023-12-27 NOTE — PROGRESS NOTES
Patient here for follow-up after CAT scan.  Continues to complain of left groin pain with coughing and exertion      On exam there is less transmissible bulge on the left side than last exam incisions healed      CT abdomen pelvis w IV contrast    Result Date: 12/22/2023  STUDY: CT Abdomen and Pelvis with IV Contrast; 12/22/2023, 8:17AM. INDICATION: Left inguinal bulge after surgery, evaluate for recurrence. COMPARISON: CT AP 9/15/2023, 4/19/2019 ACCESSION NUMBER(S): [id number] ORDERING CLINICIAN: INDERJIT ALATORRE TECHNIQUE: CT of the abdomen and pelvis was performed.  Contiguous axial images were obtained at 3 mm slice thickness through the abdomen and pelvis. Coronal and sagittal reconstructions at 3 mm slice thickness were performed.  Omnipaque 350 75 mL was administered intravenously.  FINDINGS: LOWER CHEST: No cardiomegaly.  No pericardial effusion.  Lung bases are clear.  ABDOMEN:  LIVER: No hepatomegaly.  Smooth surface contour.  Normal attenuation.  BILE DUCTS: No intrahepatic or extrahepatic biliary ductal dilatation.  GALLBLADDER: The gallbladder is negative. STOMACH: No abnormalities identified.  PANCREAS: No masses or ductal dilatation.  SPLEEN: No splenomegaly or focal splenic lesion.  ADRENAL GLANDS: No thickening or nodules.  KIDNEYS AND URETERS: Kidneys are normal in size and location.  A rounded hypodensity within the lateral aspect of the mid right kidney measures 4.0 x 4.1 cm with density value of 36 Hounsfield units.  Rounded hypodensity upper pole left kidney density value 14 Hounsfield units and size diameter 1.5 cm.  Focal atrophy along the lateral aspect of the mid left kidney. Nonobstructing 0.7 cm left renal stone  PELVIS:  BLADDER: No abnormalities identified.  REPRODUCTIVE ORGANS: No abnormalities identified.  BOWEL: Constipation without bowel obstruction.  Normal appendix  VESSELS: No abnormalities identified.  Abdominal aorta is normal in caliber.  PERITONEUM/RETROPERITONEUM/LYMPH NODES:  Small hypodense fluid collection along the right side of the bladder posterior to the lower right rectus muscle measures 3.0 x 1.3 cm image 132.  No pneumoperitoneum. No lymphadenopathy.  ABDOMINAL WALL: Status post repair of umbilical hernia with mesh.  Inflammatory changes are present within the umbilicus measuring 2.5 x 1.8 cm. Bilateral inguinal hernia repair with mesh.  No recurrent umbilical hernia. SOFT TISSUES: Mild skin thickening and hazy infiltrative changes in the soft tissues left inguinal region image 149.  Mild abdominal wall soft tissue edema.  BONES: No acute fracture or aggressive osseous lesion.    Prior umbilical hernia repair with mesh.  Hazy infiltrative changes within the soft tissues at the level the umbilicus most consistent with cellulitis.  Prior bilateral inguinal hernia repair.  There is a small low-density fluid collection along the right side of the anterior bladder and just deep to the right rectus muscle superior to the right side of the pubic symphysis measuring 3.0 x 1.3 cm and suggestive of a small seroma. Stable appearance of a 4.1 x 4.0 cm complex right renal cyst with density value suggestive of hyperdense or proteinaceous cyst. Nonobstructing 0.7 cm left renal stone unchanged. Signed by Bud Rader MD         CT results reviewed with patient do not recommend reoperation in light of negative recurrence on CT.    Patient will need 2 weeks off more from work pain medications follow-up in 2 weeks for recheck

## 2024-01-11 ENCOUNTER — OFFICE VISIT (OUTPATIENT)
Dept: SURGERY | Facility: CLINIC | Age: 44
End: 2024-01-11
Payer: COMMERCIAL

## 2024-01-11 VITALS
SYSTOLIC BLOOD PRESSURE: 142 MMHG | HEART RATE: 68 BPM | WEIGHT: 220 LBS | DIASTOLIC BLOOD PRESSURE: 94 MMHG | HEIGHT: 69 IN | BODY MASS INDEX: 32.58 KG/M2

## 2024-01-11 DIAGNOSIS — R10.32 LEFT GROIN PAIN: Primary | ICD-10-CM

## 2024-01-11 PROCEDURE — 3080F DIAST BP >= 90 MM HG: CPT | Performed by: SURGERY

## 2024-01-11 PROCEDURE — 1036F TOBACCO NON-USER: CPT | Performed by: SURGERY

## 2024-01-11 PROCEDURE — 99024 POSTOP FOLLOW-UP VISIT: CPT | Performed by: SURGERY

## 2024-01-11 PROCEDURE — 4010F ACE/ARB THERAPY RXD/TAKEN: CPT | Performed by: SURGERY

## 2024-01-11 PROCEDURE — 3077F SYST BP >= 140 MM HG: CPT | Performed by: SURGERY

## 2024-01-11 NOTE — PROGRESS NOTES
Patient for follow-up decreased pain left groin    Incisions healed no palpable recurrence left side    Back to work follow-up as needed doing well

## 2024-01-31 ENCOUNTER — OFFICE VISIT (OUTPATIENT)
Dept: PRIMARY CARE | Facility: CLINIC | Age: 44
End: 2024-01-31
Payer: COMMERCIAL

## 2024-01-31 VITALS
DIASTOLIC BLOOD PRESSURE: 80 MMHG | RESPIRATION RATE: 16 BRPM | OXYGEN SATURATION: 98 % | TEMPERATURE: 97.3 F | SYSTOLIC BLOOD PRESSURE: 126 MMHG | HEIGHT: 69 IN | WEIGHT: 220 LBS | HEART RATE: 80 BPM | BODY MASS INDEX: 32.58 KG/M2

## 2024-01-31 DIAGNOSIS — E78.5 DYSLIPIDEMIA: ICD-10-CM

## 2024-01-31 DIAGNOSIS — M25.571 PAIN IN JOINT INVOLVING RIGHT ANKLE AND FOOT: ICD-10-CM

## 2024-01-31 DIAGNOSIS — E11.9 TYPE 2 DIABETES MELLITUS TREATED WITHOUT INSULIN (MULTI): Primary | ICD-10-CM

## 2024-01-31 DIAGNOSIS — I10 PRIMARY HYPERTENSION: ICD-10-CM

## 2024-01-31 PROCEDURE — 3074F SYST BP LT 130 MM HG: CPT | Performed by: FAMILY MEDICINE

## 2024-01-31 PROCEDURE — 1036F TOBACCO NON-USER: CPT | Performed by: FAMILY MEDICINE

## 2024-01-31 PROCEDURE — 99213 OFFICE O/P EST LOW 20 MIN: CPT | Performed by: FAMILY MEDICINE

## 2024-01-31 PROCEDURE — 3079F DIAST BP 80-89 MM HG: CPT | Performed by: FAMILY MEDICINE

## 2024-01-31 PROCEDURE — 4010F ACE/ARB THERAPY RXD/TAKEN: CPT | Performed by: FAMILY MEDICINE

## 2024-01-31 RX ORDER — ASPIRIN 81 MG/1
81 TABLET ORAL DAILY
COMMUNITY

## 2024-01-31 RX ORDER — GABAPENTIN 100 MG/1
100 CAPSULE ORAL 3 TIMES DAILY
Qty: 90 CAPSULE | Refills: 5 | Status: SHIPPED | OUTPATIENT
Start: 2024-01-31 | End: 2024-07-29

## 2024-01-31 NOTE — PROGRESS NOTES
Subjective   Patient ID: Montana Kern is a 43 y.o. male who presents for Diabetes (2 month follow up ).  HPI  43-year-old gentleman history of diabetes is here to recheck.  He really has no tingling numbness [place] diet diabetic neuropathy of the foot but more of pain in his left anterior ankle from earlier injury he would like been off Neurontin would like to go on low-dose to see if it helps which it has in the past some deep achiness.  I did advise x-rays but prefers to defer at this time no swelling of the ankles or pretibial area and denies any numbness of the dorsal or plantar aspect of the feet.  Does take his 1 mg of Ozempic in addition to his metformin and low-dose Amaryl before meals and at suppertime as well.  Compliance with diabetic diet is dark definitely improved markedly improved A1c over the autumn.  Is due for repeat chemistries A1c urine albumin in early spring  The patient denies any chest pain shortness with palpitations or new GI- issues.  Compliance with lisinopril has been good as well as low-salt diet low-fat diet he is on statin therapy as well  Chronic meds reviewed no reported side effects.  Review of Systems   Constitutional:  Negative for fatigue, fever and unexpected weight change.   Eyes:  Negative for visual disturbance.   Respiratory:  Negative for cough, chest tightness and shortness of breath.    Cardiovascular:  Negative for chest pain, palpitations and leg swelling.   Gastrointestinal:  Negative for abdominal pain and blood in stool.   Genitourinary:  Positive for frequency. Negative for dysuria and hematuria.   Musculoskeletal:  Positive for arthralgias and myalgias.   Skin:  Negative for rash.   Neurological:  Negative for weakness, light-headedness and headaches.   Psychiatric/Behavioral:  Negative for behavioral problems and sleep disturbance.        Objective   /80 (BP Location: Right arm, Patient Position: Sitting, BP Cuff Size: Large adult)   Pulse 80   Temp  "36.3 °C (97.3 °F) (Temporal)   Resp 16   Ht 1.753 m (5' 9\")   Wt 99.8 kg (220 lb)   SpO2 98%   BMI 32.49 kg/m²   LDL cholesterol, direct  Order: [id number]  Status: Final result       Visible to patient: Yes (seen)       Dx: Type 2 diabetes mellitus treated with...    2 Result Notes       1 HM Topic        Component  Ref Range & Units 2 mo ago 2 yr ago 3 yr ago   LDL, Direct  0 - 129 mg/dL 84 152 High   CM   Resulting Agency [facility]              Narrative  Performed by: [facility]  Elevated levels         Hemoglobin A1C  Order: [id number]  Status: Final result       Visible to patient: Yes (seen)       Dx: Type 2 diabetes mellitus treated with...    2 Result Notes       1 HM Topic         Component  Ref Range & Units 2 mo ago  (11/4/23) 2 yr ago  (1/15/22) 2 yr ago  (10/9/21) 3 yr ago  (2/18/20)   Hemoglobin A1C  see below % 6.9 High  9.3 Abnormal  R, CM 11.5 Abnormal  R, CM 10.9 R, CM   Estimated Average Glucose  Not Established mg/dL 151 220 R 283 R 266 R   Resulting Agency [facility]                           Physical Exam  All signs reviewed and normal pulse ox is normal  Musculoskeletal pain mostly in the tibial talar junction and the arch of the left foot no acute synovitis no medial or lateral malleoli or pain.  There is no sensorimotor vascular deficit of either foot.  No rash of the feet the left ankle is normal no more tenderness over the talus and navicular bone no rash or acute edema of the foot noted.  No symmetric asymmetric edema of the pretibial  Neck neck is up without masses adenopathy bruits or rigidity thyroid is normal  Pulmonary chest clear without wheezing rales or rhonchi  Cardiovascular RSR without murmurs gallop or ectopy  Neurological cranial nerves intact and no new focal deficit upper lower extremities  Skin no rashes petechiae or jaundice      Assessment/Plan   Problem List Items Addressed This Visit    None  Continue his good low-salt low " carbohydrate low-fat diet for both hypertension dyslipidemia as well as type 2 diabetes.  He is done well on Ozempic metformin Amaryl.  He will continue his statin therapy and low-fat diet and lisinopril and metoprolol.  All question were addressed we will start back on gabapentin which did help some of the pain in his left anterior foot and the proximal arch at 100 mg twice a day with food declines x-ray at this time but strongly consider if clinical worsening follow-up in 2 to 3 months with previsit urine albumin, A1c, CMP, earlier LDL was excellent continue good frequent small meals and the above-mentioned back with oral medicines for diabetes.  Increasing pain increasing swelling the left proximal arch of the foot to notify me.  Declines orthopedic referral at this time would like a trial of Neurontin again.  @discharge  The above diagnosis and treatment plan was discussed with the patient patient will continue appropriate diet and exercise as reviewed  Patient will recheck earlier if any interval problems of significance or clinical worsening of the above problems.  Agrees above surveillance.  All question were addressed regarding above meds

## 2024-02-04 ASSESSMENT — ENCOUNTER SYMPTOMS
PALPITATIONS: 0
LIGHT-HEADEDNESS: 0
FREQUENCY: 1
WEAKNESS: 0
MYALGIAS: 1
COUGH: 0
HEADACHES: 0
SLEEP DISTURBANCE: 0
ARTHRALGIAS: 1
DYSURIA: 0
SHORTNESS OF BREATH: 0
HEMATURIA: 0
ABDOMINAL PAIN: 0
BLOOD IN STOOL: 0
UNEXPECTED WEIGHT CHANGE: 0
CHEST TIGHTNESS: 0
FATIGUE: 0
FEVER: 0

## 2024-04-18 DIAGNOSIS — I10 HYPERTENSION, UNSPECIFIED TYPE: ICD-10-CM

## 2024-04-18 DIAGNOSIS — E11.9 TYPE 2 DIABETES MELLITUS TREATED WITHOUT INSULIN (MULTI): ICD-10-CM

## 2024-04-18 DIAGNOSIS — E78.5 DYSLIPIDEMIA: ICD-10-CM

## 2024-04-18 NOTE — TELEPHONE ENCOUNTER
Rx Refill Request     Name: Montana Kern  :  1980   Medication Name:  OZEMPIC  Specific Pharmacy location:  [facility]   Date of last appointment:  2024   Date of next appointment:  Visit date not found   Best number to reach patient:  [phone]

## 2024-04-19 RX ORDER — SEMAGLUTIDE 1.34 MG/ML
1 INJECTION, SOLUTION SUBCUTANEOUS
Qty: 9 ML | Refills: 2 | Status: SHIPPED | OUTPATIENT
Start: 2024-04-21 | End: 2024-12-29

## 2024-06-14 ENCOUNTER — APPOINTMENT (OUTPATIENT)
Dept: PRIMARY CARE | Facility: CLINIC | Age: 44
End: 2024-06-14
Payer: COMMERCIAL

## 2024-06-14 VITALS
HEART RATE: 98 BPM | BODY MASS INDEX: 31.1 KG/M2 | DIASTOLIC BLOOD PRESSURE: 70 MMHG | TEMPERATURE: 96.9 F | HEIGHT: 69 IN | SYSTOLIC BLOOD PRESSURE: 112 MMHG | RESPIRATION RATE: 16 BRPM | WEIGHT: 210 LBS | OXYGEN SATURATION: 99 %

## 2024-06-14 DIAGNOSIS — M54.50 LOW BACK PAIN, UNSPECIFIED BACK PAIN LATERALITY, UNSPECIFIED CHRONICITY, UNSPECIFIED WHETHER SCIATICA PRESENT: ICD-10-CM

## 2024-06-14 DIAGNOSIS — E78.5 DYSLIPIDEMIA: ICD-10-CM

## 2024-06-14 DIAGNOSIS — I10 PRIMARY HYPERTENSION: ICD-10-CM

## 2024-06-14 DIAGNOSIS — R07.9 EXERTIONAL CHEST PAIN: ICD-10-CM

## 2024-06-14 DIAGNOSIS — E11.9 TYPE 2 DIABETES MELLITUS TREATED WITHOUT INSULIN (MULTI): Primary | ICD-10-CM

## 2024-06-14 LAB
POC APPEARANCE, URINE: CLEAR
POC BILIRUBIN, URINE: NEGATIVE
POC BLOOD, URINE: NEGATIVE
POC COLOR, URINE: YELLOW
POC GLUCOSE, URINE: NEGATIVE MG/DL
POC HEMOGLOBIN A1C: 8 % (ref 4.2–6.5)
POC KETONES, URINE: NEGATIVE MG/DL
POC LEUKOCYTES, URINE: NEGATIVE
POC NITRITE,URINE: NEGATIVE
POC PH, URINE: 5.5 PH
POC PROTEIN, URINE: NEGATIVE MG/DL
POC SPECIFIC GRAVITY, URINE: >=1.03
POC UROBILINOGEN, URINE: 0.2 EU/DL

## 2024-06-14 PROCEDURE — 1036F TOBACCO NON-USER: CPT | Performed by: FAMILY MEDICINE

## 2024-06-14 PROCEDURE — 3074F SYST BP LT 130 MM HG: CPT | Performed by: FAMILY MEDICINE

## 2024-06-14 PROCEDURE — 81003 URINALYSIS AUTO W/O SCOPE: CPT | Performed by: FAMILY MEDICINE

## 2024-06-14 PROCEDURE — 99214 OFFICE O/P EST MOD 30 MIN: CPT | Performed by: FAMILY MEDICINE

## 2024-06-14 PROCEDURE — 83036 HEMOGLOBIN GLYCOSYLATED A1C: CPT | Performed by: FAMILY MEDICINE

## 2024-06-14 PROCEDURE — 4010F ACE/ARB THERAPY RXD/TAKEN: CPT | Performed by: FAMILY MEDICINE

## 2024-06-14 PROCEDURE — 93000 ELECTROCARDIOGRAM COMPLETE: CPT | Performed by: FAMILY MEDICINE

## 2024-06-14 PROCEDURE — 3078F DIAST BP <80 MM HG: CPT | Performed by: FAMILY MEDICINE

## 2024-06-14 RX ORDER — PREDNISONE 20 MG/1
TABLET ORAL
Qty: 8 TABLET | Refills: 0 | Status: SHIPPED | OUTPATIENT
Start: 2024-06-14

## 2024-06-14 RX ORDER — METOPROLOL SUCCINATE 50 MG/1
50 TABLET, EXTENDED RELEASE ORAL DAILY
Qty: 30 TABLET | Refills: 2 | Status: SHIPPED | OUTPATIENT
Start: 2024-06-14 | End: 2024-07-14

## 2024-06-14 NOTE — PROGRESS NOTES
Subjective   Patient ID: Montana Kern is a 43 y.o. male who presents for Exertional Chest Pain and Back Pain.  HPI  43-year-old gentleman here for 2 major issues as a history of diabetes and on Ozempic with improved glycemic control in addition to his metformin twice a day and Amaryl 3 times a day.  Recently with a lot of work outside has had some tightness in the low back area no new  symptoms no GI symptoms no weakness numbness paresthesias but otherwise pain in the tight in the low back with occasional radiation to the superior buttocks no direct fall or trauma  The patient's had some unusual achiness in the lower and left precordial area last for anywhere from 30 seconds to a minute but does occur with exertion the patient does have history of dyslipidemia on Lipitor hypertension on 2 meds and also on diabetes.  The patient denies pain radiating from the chest into the neck shoulders or jaw-- no palpitations no dyspnea pain is not really brought on consistently with exercise or by eating lean hematochezia or hematemesis is on his beta-blocker and lisinopril for hypertension pressures been stable and weakness recently but more concerned about low back discomfort and mentions incidentally the discomfort in the chest occasionally with heavy work which comes and goes  Review of Systems   Constitutional:  Negative for fatigue, fever and unexpected weight change.   HENT:  Positive for rhinorrhea. Negative for sinus pressure and sinus pain.    Eyes:  Negative for visual disturbance.   Respiratory:  Negative for cough, chest tightness and shortness of breath.    Cardiovascular:  Positive for chest pain. Negative for palpitations and leg swelling.   Gastrointestinal:  Negative for abdominal distention, abdominal pain, blood in stool and vomiting.   Genitourinary:  Positive for frequency. Negative for dysuria, flank pain and hematuria.   Musculoskeletal:  Positive for arthralgias, back pain and myalgias. Negative for  "neck pain and neck stiffness.   Skin:  Negative for rash.   Neurological:  Positive for speech difficulty. Negative for weakness, light-headedness, numbness and headaches.   Psychiatric/Behavioral:  Negative for behavioral problems, confusion, sleep disturbance and suicidal ideas.        Objective   /70 (BP Location: Left arm, Patient Position: Sitting, BP Cuff Size: Adult)   Pulse 98   Temp 36.1 °C (96.9 °F) (Temporal)   Resp 16   Ht 1.753 m (5' 9\")   Wt 95.3 kg (210 lb)   SpO2 99%   BMI 31.01 kg/m²     Lab Results   Component Value Date    HGBA1C 8.0 (A) 06/14/2024     Component  Ref Range & Units 13:26   POC Color, Urine  Straw, Yellow, Light-Yellow Yellow   POC Appearance, Urine  Clear Clear   POC Glucose, Urine  NEGATIVE mg/dl NEGATIVE   POC Bilirubin, Urine  NEGATIVE NEGATIVE   POC Ketones, Urine  NEGATIVE mg/dl NEGATIVE   POC Specific Gravity, Urine  1.005 - 1.035 >=1.030   POC Blood, Urine  NEGATIVE NEGATIVE   POC PH, Urine  No Reference Range Established PH 5.5   POC Protein, Urine  NEGATIVE, 30 (1+) mg/dl NEGATIVE   POC Urobilinogen, Urine  0.2, 1.0 EU/DL 0.2   Poc Nitrite, Urine  NEGATIVE NEGATIVE   POC Leukocytes, Urine  NEGATIVE NEGATIVE              Specimen Collected: 06/14/24 13:26 Last Resulted: 06/14/24 13:26               Physical Exam  Also reviewed normal good at the time patient weight loss in the last 4 to 5 months  General-in Spectrila pleasant or active individual in no acute distress  Neck without masses adenopathy bruits rigidity no Kussmaul sign no JVD thyroid is normal no stridor  Cardiovascular RSR without significant murmurs gallop or ectopy  Pulmonary bronchial Lenora pretty but otherwise no wheezing rales or rhonchi  Abdominal no organomegaly masses or rebound minimal tenderness to the left lower sternal area and left lateral rib but otherwise no flank tenderness.  Bowel sounds are normal  Musculoskeletal except for mild intercostal pain in the lower inframammary area " the patient does have pain in his midline L4-L5 and especially in his medial SI joints bilaterally straight leg raising is negative no motor sensorivascular deficit of the lower extremities hip show no range of motion abnormalities and no palpable tenderness  Peripheral vascular no symmetric asymmetric edema of the lower extremities no motor sensorivascular deficits  EKG shows no acute ST or T wave changes axis normal rhythm is sinus but otherwise questionable Q waves in 2 of his inferior leads it is normal      Assessment/Plan   Problem List Items Addressed This Visit       Type 2 diabetes mellitus treated without insulin (Multi)    Relevant Orders    POCT glycosylated hemoglobin (Hb A1C) manually resulted     Other Visit Diagnoses       Low back pain, unspecified back pain laterality, unspecified chronicity, unspecified whether sciatica present        Relevant Orders    POCT UA Automated manually resulted    Exertional chest pain        Relevant Orders    ECG 12 lead (Clinic Performed)        Patient will continue his antihypertensive medicine as well as 1 baby aspirin a day and his lisinopril and beta-blocker with occasional pain in his right flank area and muscle tightness we did a urine which was completely negative  Because of Q waves EKG as well as both diabetes and hypertension and a gentleman we need to clarify with nuclear stress testing he has no chest pain at this time declines ER visit at this time but maintain him on baby aspirin a day as well as antilipid therapy.  Importance of this reviewed ER parameters reviewed in detail  For recent strain of the low back muscles will place on prednisone for the next 5 days.  Maintain low-salt low carbohydrate and low-fat diet with history of hypertension diabetes and dyslipidemia aware of ER parameters of significant chest pain returning as he is clinically stable at this time without acute changes on his EKG agrees the importance of follow-up in 3 to 4 weeks  otherwise try to expedite his nuclear stress test routine strongly advised in addition to above diet and chronic medicines  If worsening chest pain or shortness of breath despite treatment with weakness, proceed to ER   @discharge  The above diagnosis and treatment plan was discussed with the patient patient will continue appropriate diet and exercise as reviewed  Patient will recheck earlier if any interval problems of significance or clinical worsening of the above problems.  Agrees above surveillance.  All question were addressed regarding above meds

## 2024-06-18 PROBLEM — M54.50 LOW BACK PAIN: Status: ACTIVE | Noted: 2024-06-18

## 2024-06-18 PROBLEM — R07.9 EXERTIONAL CHEST PAIN: Status: ACTIVE | Noted: 2024-06-18

## 2024-06-18 ASSESSMENT — ENCOUNTER SYMPTOMS
CHEST TIGHTNESS: 0
FEVER: 0
HEMATURIA: 0
WEAKNESS: 0
COUGH: 0
NUMBNESS: 0
LIGHT-HEADEDNESS: 0
BLOOD IN STOOL: 0
SINUS PRESSURE: 0
PALPITATIONS: 0
NECK PAIN: 0
SPEECH DIFFICULTY: 1
FREQUENCY: 1
UNEXPECTED WEIGHT CHANGE: 0
RHINORRHEA: 1
FATIGUE: 0
CONFUSION: 0
MYALGIAS: 1
ABDOMINAL DISTENTION: 0
DYSURIA: 0
HEADACHES: 0
SLEEP DISTURBANCE: 0
FLANK PAIN: 0
VOMITING: 0
ARTHRALGIAS: 1
SHORTNESS OF BREATH: 0
SINUS PAIN: 0
BACK PAIN: 1
ABDOMINAL PAIN: 0
NECK STIFFNESS: 0

## 2024-07-11 ENCOUNTER — HOSPITAL ENCOUNTER (OUTPATIENT)
Dept: RADIOLOGY | Facility: HOSPITAL | Age: 44
Discharge: HOME | End: 2024-07-11
Payer: COMMERCIAL

## 2024-07-11 ENCOUNTER — HOSPITAL ENCOUNTER (OUTPATIENT)
Dept: CARDIOLOGY | Facility: HOSPITAL | Age: 44
Discharge: HOME | End: 2024-07-11
Payer: COMMERCIAL

## 2024-07-11 DIAGNOSIS — E11.9 TYPE 2 DIABETES MELLITUS TREATED WITHOUT INSULIN (MULTI): ICD-10-CM

## 2024-07-11 DIAGNOSIS — R07.9 EXERTIONAL CHEST PAIN: ICD-10-CM

## 2024-07-11 PROCEDURE — A9502 TC99M TETROFOSMIN: HCPCS | Performed by: FAMILY MEDICINE

## 2024-07-11 PROCEDURE — 93018 CV STRESS TEST I&R ONLY: CPT | Performed by: INTERNAL MEDICINE

## 2024-07-11 PROCEDURE — 2500000004 HC RX 250 GENERAL PHARMACY W/ HCPCS (ALT 636 FOR OP/ED): Performed by: FAMILY MEDICINE

## 2024-07-11 PROCEDURE — 3430000001 HC RX 343 DIAGNOSTIC RADIOPHARMACEUTICALS: Performed by: FAMILY MEDICINE

## 2024-07-11 PROCEDURE — 78452 HT MUSCLE IMAGE SPECT MULT: CPT

## 2024-07-11 PROCEDURE — 78452 HT MUSCLE IMAGE SPECT MULT: CPT | Performed by: RADIOLOGY

## 2024-07-11 PROCEDURE — 93017 CV STRESS TEST TRACING ONLY: CPT

## 2024-07-11 PROCEDURE — 93016 CV STRESS TEST SUPVJ ONLY: CPT | Performed by: INTERNAL MEDICINE

## 2024-07-11 RX ORDER — REGADENOSON 0.08 MG/ML
0.4 INJECTION, SOLUTION INTRAVENOUS ONCE
Status: COMPLETED | OUTPATIENT
Start: 2024-07-11 | End: 2024-07-11

## 2024-07-15 DIAGNOSIS — E78.5 DYSLIPIDEMIA: ICD-10-CM

## 2024-07-15 DIAGNOSIS — E11.9 TYPE 2 DIABETES MELLITUS TREATED WITHOUT INSULIN (MULTI): ICD-10-CM

## 2024-07-15 DIAGNOSIS — R94.39 ABNORMAL STRESS TEST: ICD-10-CM

## 2024-07-15 DIAGNOSIS — I10 PRIMARY HYPERTENSION: ICD-10-CM

## 2024-07-19 ENCOUNTER — OFFICE VISIT (OUTPATIENT)
Dept: CARDIOLOGY | Facility: CLINIC | Age: 44
End: 2024-07-19
Payer: COMMERCIAL

## 2024-07-19 VITALS
HEART RATE: 68 BPM | WEIGHT: 214.6 LBS | SYSTOLIC BLOOD PRESSURE: 118 MMHG | DIASTOLIC BLOOD PRESSURE: 80 MMHG | BODY MASS INDEX: 30.04 KG/M2 | HEIGHT: 71 IN

## 2024-07-19 DIAGNOSIS — E11.9 TYPE 2 DIABETES MELLITUS TREATED WITHOUT INSULIN (MULTI): ICD-10-CM

## 2024-07-19 DIAGNOSIS — I10 PRIMARY HYPERTENSION: ICD-10-CM

## 2024-07-19 DIAGNOSIS — I20.0 ANGINA PECTORIS, UNSTABLE (MULTI): ICD-10-CM

## 2024-07-19 DIAGNOSIS — R07.9 EXERTIONAL CHEST PAIN: ICD-10-CM

## 2024-07-19 DIAGNOSIS — E78.5 DYSLIPIDEMIA: ICD-10-CM

## 2024-07-19 DIAGNOSIS — Z78.9 NEVER SMOKED CIGARETTES: ICD-10-CM

## 2024-07-19 DIAGNOSIS — R94.39 ABNORMAL STRESS TEST: Primary | ICD-10-CM

## 2024-07-19 DIAGNOSIS — I10 ESSENTIAL HYPERTENSION, BENIGN: ICD-10-CM

## 2024-07-19 DIAGNOSIS — Z76.89 ENCOUNTER TO ESTABLISH CARE: ICD-10-CM

## 2024-07-19 PROBLEM — I20.9 ANGINA, CLASS III (CMS-HCC): Status: ACTIVE | Noted: 2024-07-19

## 2024-07-19 PROCEDURE — 1036F TOBACCO NON-USER: CPT | Performed by: INTERNAL MEDICINE

## 2024-07-19 PROCEDURE — 3074F SYST BP LT 130 MM HG: CPT | Performed by: INTERNAL MEDICINE

## 2024-07-19 PROCEDURE — 93000 ELECTROCARDIOGRAM COMPLETE: CPT | Performed by: INTERNAL MEDICINE

## 2024-07-19 PROCEDURE — 4010F ACE/ARB THERAPY RXD/TAKEN: CPT | Performed by: INTERNAL MEDICINE

## 2024-07-19 PROCEDURE — 99205 OFFICE O/P NEW HI 60 MIN: CPT | Performed by: INTERNAL MEDICINE

## 2024-07-19 PROCEDURE — 3079F DIAST BP 80-89 MM HG: CPT | Performed by: INTERNAL MEDICINE

## 2024-07-19 PROCEDURE — 3008F BODY MASS INDEX DOCD: CPT | Performed by: INTERNAL MEDICINE

## 2024-07-19 RX ORDER — NITROGLYCERIN 0.4 MG/1
0.4 TABLET SUBLINGUAL EVERY 5 MIN PRN
Qty: 25 TABLET | Refills: 5 | Status: SHIPPED | OUTPATIENT
Start: 2024-07-19 | End: 2025-07-19

## 2024-07-19 NOTE — PROGRESS NOTES
Referred by Leonardo Mckeon DO provider found for   Chief Complaint   Patient presents with    New Patient Visit     Referred for abnormal stress test        History of Present Illness  Montana Kern is a 44 y.o. year old male patient is here for cardiac evaluation.  He is a patient with history of hypertension, history of diabetes, history of hyperlipidemia.  Complain of 2 months worth of exertional chest discomfort.  He stated every time he walks and does any activity he feels severe chest pain mainly substernal with some radiation to the arm and the back.  The pain goes away with rest.  He had a nuclear stress test showed inferior scar with possible inferior ischemia.  He has not taken any nitroglycerin.  I discussed with the patient in great length that EKG showed sinus rhythm and no acute changes.  Will his symptoms are very suggestive of class III angina.  Will go ahead with coronary angiography and add nitroglycerin to the regimen.  Based on that further recommendation will be made    Past Medical History  Past Medical History:   Diagnosis Date    Acute bronchitis due to other specified organisms 11/26/2020    Acute bronchitis due to other specified organisms    Asthma ([facility])     Diabetes (Multi)     type 2    Hyperlipidemia     Hypertension     Kidney stones     passed on own    Osteoarthritis     ankles    Personal history of other specified conditions 11/26/2020    History of persistent cough       Social History  Social History     Tobacco Use    Smoking status: Never    Smokeless tobacco: Never   Vaping Use    Vaping status: Never Used   Substance Use Topics    Alcohol use: Yes     Comment: occasionally    Drug use: Never       Family History     Family History   Problem Relation Name Age of Onset    Heart disease Mother      Stroke Father      Heart attack Father         Review of Systems  As per HPI, all other systems reviewed and negative.    Allergies:  No Known Allergies     Outpatient  Medications:  Current Outpatient Medications   Medication Instructions    aspirin 81 mg, oral, Daily    atorvastatin (LIPITOR) 20 mg, oral, Nightly    gabapentin (NEURONTIN) 300 mg, oral, 3 times daily    gabapentin (NEURONTIN) 100 mg, oral, 3 times daily, If needed for anterior right ankle pain    glimepiride (Amaryl) 4 mg tablet TAKE 1/2 TABLET BY MOUTH TWICE DAILY BEFORE A MEAL AND 1 TABLET BY MOUTH BEFORE DINNER    lisinopril 40 mg, oral, Daily    metFORMIN (GLUCOPHAGE) 1,000 mg, oral, 2 times daily (morning and late afternoon)    metoprolol succinate XL (TOPROL-XL) 25 mg, oral, Daily    metoprolol succinate XL (TOPROL-XL) 50 mg, oral, Daily, Do not crush or chew.    Ozempic 1 mg, subcutaneous, Once Weekly, Wednesday    predniSONE (Deltasone) 20 mg tablet Take 1 tab bid today, then 1 daily         Vitals:  Vitals:    07/19/24 1409   BP: 118/80   Pulse: 68       Physical Exam:  Physical Exam  Constitutional:       Appearance: Normal appearance.   HENT:      Head: Normocephalic.   Eyes:      Pupils: Pupils are equal, round, and reactive to light.   Cardiovascular:      Rate and Rhythm: Normal rate and regular rhythm.      Pulses: Normal pulses.      Heart sounds: Normal heart sounds.   Pulmonary:      Effort: Pulmonary effort is normal.      Breath sounds: Normal breath sounds.   Musculoskeletal:         General: Normal range of motion.   Skin:     General: Skin is warm and dry.   Neurological:      General: No focal deficit present.      Mental Status: He is alert and oriented to person, place, and time.             Assessment/Plan   Diagnoses and all orders for this visit:  Abnormal stress test  -     Referral to Cardiology  Type 2 diabetes mellitus treated without insulin (Multi)  -     Referral to Cardiology  Primary hypertension  -     Referral to Cardiology  Dyslipidemia  -     Referral to Cardiology  Encounter to establish care  Essential hypertension, benign  Exertional chest pain  BMI  30.0-30.9,adult  Never smoked cigarettes  Angina pectoris, unstable (Multi)          Rome Guevara MD [facility]  Interventional Cardiology   of [facility]     Thank you for allowing me to participate in the care of this patient. Please do not hesitate to contact me with any further questions or concerns.

## 2024-07-19 NOTE — H&P (VIEW-ONLY)
Referred by Leonardo Mckeon DO provider found for   Chief Complaint   Patient presents with    New Patient Visit     Referred for abnormal stress test        History of Present Illness  Montana Kern is a 44 y.o. year old male patient is here for cardiac evaluation.  He is a patient with history of hypertension, history of diabetes, history of hyperlipidemia.  Complain of 2 months worth of exertional chest discomfort.  He stated every time he walks and does any activity he feels severe chest pain mainly substernal with some radiation to the arm and the back.  The pain goes away with rest.  He had a nuclear stress test showed inferior scar with possible inferior ischemia.  He has not taken any nitroglycerin.  I discussed with the patient in great length that EKG showed sinus rhythm and no acute changes.  Will his symptoms are very suggestive of class III angina.  Will go ahead with coronary angiography and add nitroglycerin to the regimen.  Based on that further recommendation will be made    Past Medical History  Past Medical History:   Diagnosis Date    Acute bronchitis due to other specified organisms 11/26/2020    Acute bronchitis due to other specified organisms    Asthma ([facility])     Diabetes (Multi)     type 2    Hyperlipidemia     Hypertension     Kidney stones     passed on own    Osteoarthritis     ankles    Personal history of other specified conditions 11/26/2020    History of persistent cough       Social History  Social History     Tobacco Use    Smoking status: Never    Smokeless tobacco: Never   Vaping Use    Vaping status: Never Used   Substance Use Topics    Alcohol use: Yes     Comment: occasionally    Drug use: Never       Family History     Family History   Problem Relation Name Age of Onset    Heart disease Mother      Stroke Father      Heart attack Father         Review of Systems  As per HPI, all other systems reviewed and negative.    Allergies:  No Known Allergies     Outpatient  Medications:  Current Outpatient Medications   Medication Instructions    aspirin 81 mg, oral, Daily    atorvastatin (LIPITOR) 20 mg, oral, Nightly    gabapentin (NEURONTIN) 300 mg, oral, 3 times daily    gabapentin (NEURONTIN) 100 mg, oral, 3 times daily, If needed for anterior right ankle pain    glimepiride (Amaryl) 4 mg tablet TAKE 1/2 TABLET BY MOUTH TWICE DAILY BEFORE A MEAL AND 1 TABLET BY MOUTH BEFORE DINNER    lisinopril 40 mg, oral, Daily    metFORMIN (GLUCOPHAGE) 1,000 mg, oral, 2 times daily (morning and late afternoon)    metoprolol succinate XL (TOPROL-XL) 25 mg, oral, Daily    metoprolol succinate XL (TOPROL-XL) 50 mg, oral, Daily, Do not crush or chew.    Ozempic 1 mg, subcutaneous, Once Weekly, Wednesday    predniSONE (Deltasone) 20 mg tablet Take 1 tab bid today, then 1 daily         Vitals:  Vitals:    07/19/24 1409   BP: 118/80   Pulse: 68       Physical Exam:  Physical Exam  Constitutional:       Appearance: Normal appearance.   HENT:      Head: Normocephalic.   Eyes:      Pupils: Pupils are equal, round, and reactive to light.   Cardiovascular:      Rate and Rhythm: Normal rate and regular rhythm.      Pulses: Normal pulses.      Heart sounds: Normal heart sounds.   Pulmonary:      Effort: Pulmonary effort is normal.      Breath sounds: Normal breath sounds.   Musculoskeletal:         General: Normal range of motion.   Skin:     General: Skin is warm and dry.   Neurological:      General: No focal deficit present.      Mental Status: He is alert and oriented to person, place, and time.             Assessment/Plan   Diagnoses and all orders for this visit:  Abnormal stress test  -     Referral to Cardiology  Type 2 diabetes mellitus treated without insulin (Multi)  -     Referral to Cardiology  Primary hypertension  -     Referral to Cardiology  Dyslipidemia  -     Referral to Cardiology  Encounter to establish care  Essential hypertension, benign  Exertional chest pain  BMI  30.0-30.9,adult  Never smoked cigarettes  Angina pectoris, unstable (Multi)          Rome Guevara MD [facility]  Interventional Cardiology   of [facility]     Thank you for allowing me to participate in the care of this patient. Please do not hesitate to contact me with any further questions or concerns.

## 2024-07-19 NOTE — PATIENT INSTRUCTIONS
Continue same medications and treatments.   Patient educated on proper medication use.   Patient educated on risk factor modification.   Please bring any lab results from other providers / physicians to your next appointment.     Please bring all medicines, vitamins, and herbal supplements with you when you come to the office.     Prescriptions will not be filled unless you are compliant with your follow up appointments or have a follow up appointment scheduled as per instruction of your physician. Refills should be requested at the time of your visit.    nitroglycerin    ORDERED  LEFT HEART CATH ORDERED  HOLD DIABETIC PILLS MORNING OF    I, Lisy Gonzalez LPN, am scribing for and in the presence of Dr. Rome Guevara MD, FACC

## 2024-07-22 ENCOUNTER — LAB (OUTPATIENT)
Dept: LAB | Facility: LAB | Age: 44
End: 2024-07-22
Payer: COMMERCIAL

## 2024-07-22 DIAGNOSIS — R94.39 ABNORMAL STRESS TEST: ICD-10-CM

## 2024-07-22 DIAGNOSIS — I20.0 ANGINA PECTORIS, UNSTABLE (MULTI): ICD-10-CM

## 2024-07-22 LAB
ANION GAP SERPL CALC-SCNC: 11 MMOL/L (ref 10–20)
BUN SERPL-MCNC: 17 MG/DL (ref 6–23)
CALCIUM SERPL-MCNC: 9 MG/DL (ref 8.6–10.3)
CHLORIDE SERPL-SCNC: 101 MMOL/L (ref 98–107)
CO2 SERPL-SCNC: 29 MMOL/L (ref 21–32)
CREAT SERPL-MCNC: 0.91 MG/DL (ref 0.5–1.3)
EGFRCR SERPLBLD CKD-EPI 2021: >90 ML/MIN/1.73M*2
ERYTHROCYTE [DISTWIDTH] IN BLOOD BY AUTOMATED COUNT: 12.9 % (ref 11.5–14.5)
GLUCOSE SERPL-MCNC: 250 MG/DL (ref 74–99)
HCT VFR BLD AUTO: 40.6 % (ref 41–52)
HGB BLD-MCNC: 13.3 G/DL (ref 13.5–17.5)
MCH RBC QN AUTO: 27.3 PG (ref 26–34)
MCHC RBC AUTO-ENTMCNC: 32.8 G/DL (ref 32–36)
MCV RBC AUTO: 83 FL (ref 80–100)
NRBC BLD-RTO: 0 /100 WBCS (ref 0–0)
PLATELET # BLD AUTO: 244 X10*3/UL (ref 150–450)
POTASSIUM SERPL-SCNC: 4.2 MMOL/L (ref 3.5–5.3)
RBC # BLD AUTO: 4.87 X10*6/UL (ref 4.5–5.9)
SODIUM SERPL-SCNC: 137 MMOL/L (ref 136–145)
WBC # BLD AUTO: 5.5 X10*3/UL (ref 4.4–11.3)

## 2024-07-22 PROCEDURE — 80048 BASIC METABOLIC PNL TOTAL CA: CPT

## 2024-07-22 PROCEDURE — 36415 COLL VENOUS BLD VENIPUNCTURE: CPT

## 2024-07-22 PROCEDURE — 85027 COMPLETE CBC AUTOMATED: CPT

## 2024-07-24 RX ORDER — ASPIRIN 325 MG
325 TABLET ORAL ONCE
Status: CANCELLED | OUTPATIENT
Start: 2024-07-24 | End: 2024-07-24

## 2024-07-26 ENCOUNTER — APPOINTMENT (OUTPATIENT)
Dept: CARDIOLOGY | Facility: HOSPITAL | Age: 44
End: 2024-07-26
Payer: COMMERCIAL

## 2024-07-26 ENCOUNTER — APPOINTMENT (OUTPATIENT)
Dept: RADIOLOGY | Facility: HOSPITAL | Age: 44
End: 2024-07-26
Payer: COMMERCIAL

## 2024-07-26 ENCOUNTER — HOSPITAL ENCOUNTER (OUTPATIENT)
Dept: CARDIOLOGY | Facility: HOSPITAL | Age: 44
Setting detail: OUTPATIENT SURGERY
Discharge: HOME | End: 2024-07-26
Payer: COMMERCIAL

## 2024-07-26 ENCOUNTER — HOSPITAL ENCOUNTER (OUTPATIENT)
Facility: HOSPITAL | Age: 44
Setting detail: OUTPATIENT SURGERY
Discharge: HOME | End: 2024-07-26
Attending: INTERNAL MEDICINE | Admitting: INTERNAL MEDICINE
Payer: COMMERCIAL

## 2024-07-26 VITALS — TEMPERATURE: 36.1 F

## 2024-07-26 DIAGNOSIS — E78.5 DYSLIPIDEMIA: ICD-10-CM

## 2024-07-26 DIAGNOSIS — R94.39 ABNORMAL STRESS TEST: Primary | ICD-10-CM

## 2024-07-26 DIAGNOSIS — I25.119 CORONARY ARTERY DISEASE INVOLVING NATIVE CORONARY ARTERY OF NATIVE HEART WITH ANGINA PECTORIS (CMS-HCC): ICD-10-CM

## 2024-07-26 DIAGNOSIS — I20.9 ANGINA, CLASS III (CMS-HCC): ICD-10-CM

## 2024-07-26 DIAGNOSIS — R07.9 EXERTIONAL CHEST PAIN: ICD-10-CM

## 2024-07-26 DIAGNOSIS — I20.0 ANGINA PECTORIS, UNSTABLE (MULTI): ICD-10-CM

## 2024-07-26 DIAGNOSIS — Z01.810 ENCOUNTER FOR PREPROCEDURAL CARDIOVASCULAR EXAMINATION: ICD-10-CM

## 2024-07-26 LAB
AORTIC VALVE MEAN GRADIENT: 3 MMHG
AORTIC VALVE PEAK VELOCITY: 1.15 M/S
ATRIAL RATE: 66 BPM
AV PEAK GRADIENT: 5.3 MMHG
AVA (PEAK VEL): 3.57 CM2
AVA (VTI): 3.42 CM2
EJECTION FRACTION APICAL 4 CHAMBER: 52.1
EJECTION FRACTION: 58 %
LEFT ATRIUM VOLUME AREA LENGTH INDEX BSA: 31.6 ML/M2
LEFT VENTRICLE INTERNAL DIMENSION DIASTOLE: 4.44 CM (ref 3.5–6)
LEFT VENTRICULAR OUTFLOW TRACT DIAMETER: 2.2 CM
LV EJECTION FRACTION BIPLANE: 53 %
MITRAL VALVE E/A RATIO: 1.18
P AXIS: 11 DEGREES
P OFFSET: 183 MS
P ONSET: 125 MS
PR INTERVAL: 166 MS
Q ONSET: 208 MS
QRS COUNT: 11 BEATS
QRS DURATION: 96 MS
QT INTERVAL: 394 MS
QTC CALCULATION(BAZETT): 413 MS
QTC FREDERICIA: 407 MS
R AXIS: 1 DEGREES
RIGHT VENTRICLE FREE WALL PEAK S': 11.9 CM/S
RIGHT VENTRICLE PEAK SYSTOLIC PRESSURE: 26.4 MMHG
T AXIS: 24 DEGREES
T OFFSET: 405 MS
TRICUSPID ANNULAR PLANE SYSTOLIC EXCURSION: 2.2 CM
VENTRICULAR RATE: 66 BPM

## 2024-07-26 PROCEDURE — 93880 EXTRACRANIAL BILAT STUDY: CPT

## 2024-07-26 PROCEDURE — 7100000009 HC PHASE TWO TIME - INITIAL BASE CHARGE: Performed by: INTERNAL MEDICINE

## 2024-07-26 PROCEDURE — 2500000005 HC RX 250 GENERAL PHARMACY W/O HCPCS: Performed by: INTERNAL MEDICINE

## 2024-07-26 PROCEDURE — 2550000001 HC RX 255 CONTRASTS: Performed by: INTERNAL MEDICINE

## 2024-07-26 PROCEDURE — 99024 POSTOP FOLLOW-UP VISIT: CPT | Performed by: NURSE PRACTITIONER

## 2024-07-26 PROCEDURE — 99152 MOD SED SAME PHYS/QHP 5/>YRS: CPT | Performed by: INTERNAL MEDICINE

## 2024-07-26 PROCEDURE — 93458 L HRT ARTERY/VENTRICLE ANGIO: CPT | Performed by: INTERNAL MEDICINE

## 2024-07-26 PROCEDURE — 2500000004 HC RX 250 GENERAL PHARMACY W/ HCPCS (ALT 636 FOR OP/ED): Performed by: NURSE PRACTITIONER

## 2024-07-26 PROCEDURE — C1894 INTRO/SHEATH, NON-LASER: HCPCS | Performed by: INTERNAL MEDICINE

## 2024-07-26 PROCEDURE — 93306 TTE W/DOPPLER COMPLETE: CPT

## 2024-07-26 PROCEDURE — 2500000004 HC RX 250 GENERAL PHARMACY W/ HCPCS (ALT 636 FOR OP/ED): Performed by: INTERNAL MEDICINE

## 2024-07-26 PROCEDURE — 71046 X-RAY EXAM CHEST 2 VIEWS: CPT

## 2024-07-26 PROCEDURE — 2720000007 HC OR 272 NO HCPCS: Performed by: INTERNAL MEDICINE

## 2024-07-26 PROCEDURE — 93005 ELECTROCARDIOGRAM TRACING: CPT

## 2024-07-26 PROCEDURE — 7100000010 HC PHASE TWO TIME - EACH INCREMENTAL 1 MINUTE: Performed by: INTERNAL MEDICINE

## 2024-07-26 PROCEDURE — 93306 TTE W/DOPPLER COMPLETE: CPT | Performed by: INTERNAL MEDICINE

## 2024-07-26 PROCEDURE — 96373 THER/PROPH/DIAG INJ IA: CPT | Mod: 59 | Performed by: INTERNAL MEDICINE

## 2024-07-26 PROCEDURE — C1887 CATHETER, GUIDING: HCPCS | Performed by: INTERNAL MEDICINE

## 2024-07-26 PROCEDURE — 2500000001 HC RX 250 WO HCPCS SELF ADMINISTERED DRUGS (ALT 637 FOR MEDICARE OP): Performed by: NURSE PRACTITIONER

## 2024-07-26 PROCEDURE — 71250 CT THORAX DX C-: CPT

## 2024-07-26 RX ORDER — MIDAZOLAM HYDROCHLORIDE 1 MG/ML
INJECTION, SOLUTION INTRAMUSCULAR; INTRAVENOUS AS NEEDED
Status: DISCONTINUED | OUTPATIENT
Start: 2024-07-26 | End: 2024-07-26 | Stop reason: HOSPADM

## 2024-07-26 RX ORDER — RANOLAZINE 500 MG/1
500 TABLET, EXTENDED RELEASE ORAL ONCE
Status: COMPLETED | OUTPATIENT
Start: 2024-07-26 | End: 2024-07-26

## 2024-07-26 RX ORDER — ATORVASTATIN CALCIUM 80 MG/1
80 TABLET, FILM COATED ORAL DAILY
Qty: 30 TABLET | Refills: 1 | Status: SHIPPED | OUTPATIENT
Start: 2024-07-26 | End: 2024-09-24

## 2024-07-26 RX ORDER — FENTANYL CITRATE 50 UG/ML
INJECTION, SOLUTION INTRAMUSCULAR; INTRAVENOUS AS NEEDED
Status: DISCONTINUED | OUTPATIENT
Start: 2024-07-26 | End: 2024-07-26 | Stop reason: HOSPADM

## 2024-07-26 RX ORDER — METOPROLOL SUCCINATE 50 MG/1
50 TABLET, EXTENDED RELEASE ORAL DAILY
COMMUNITY

## 2024-07-26 RX ORDER — HEPARIN SODIUM 1000 [USP'U]/ML
INJECTION, SOLUTION INTRAVENOUS; SUBCUTANEOUS AS NEEDED
Status: DISCONTINUED | OUTPATIENT
Start: 2024-07-26 | End: 2024-07-26 | Stop reason: HOSPADM

## 2024-07-26 RX ORDER — SODIUM CHLORIDE 9 MG/ML
100 INJECTION, SOLUTION INTRAVENOUS CONTINUOUS
Status: DISCONTINUED | OUTPATIENT
Start: 2024-07-26 | End: 2024-07-26

## 2024-07-26 RX ORDER — SODIUM CHLORIDE 9 MG/ML
100 INJECTION, SOLUTION INTRAVENOUS CONTINUOUS
Status: ACTIVE | OUTPATIENT
Start: 2024-07-26 | End: 2024-07-26

## 2024-07-26 RX ORDER — NITROGLYCERIN 5 MG/ML
INJECTION, SOLUTION INTRAVENOUS AS NEEDED
Status: DISCONTINUED | OUTPATIENT
Start: 2024-07-26 | End: 2024-07-26 | Stop reason: HOSPADM

## 2024-07-26 RX ORDER — LIDOCAINE HYDROCHLORIDE 20 MG/ML
INJECTION, SOLUTION INFILTRATION; PERINEURAL AS NEEDED
Status: DISCONTINUED | OUTPATIENT
Start: 2024-07-26 | End: 2024-07-26 | Stop reason: HOSPADM

## 2024-07-26 ASSESSMENT — ENCOUNTER SYMPTOMS
ACTIVITY CHANGE: 1
PALPITATIONS: 0
FATIGUE: 1
NAUSEA: 0
DIZZINESS: 0
SHORTNESS OF BREATH: 0

## 2024-07-26 ASSESSMENT — PAIN - FUNCTIONAL ASSESSMENT
PAIN_FUNCTIONAL_ASSESSMENT: 0-10

## 2024-07-26 ASSESSMENT — COLUMBIA-SUICIDE SEVERITY RATING SCALE - C-SSRS
6. HAVE YOU EVER DONE ANYTHING, STARTED TO DO ANYTHING, OR PREPARED TO DO ANYTHING TO END YOUR LIFE?: NO
6. HAVE YOU EVER DONE ANYTHING, STARTED TO DO ANYTHING, OR PREPARED TO DO ANYTHING TO END YOUR LIFE?: NO
1. IN THE PAST MONTH, HAVE YOU WISHED YOU WERE DEAD OR WISHED YOU COULD GO TO SLEEP AND NOT WAKE UP?: NO
2. HAVE YOU ACTUALLY HAD ANY THOUGHTS OF KILLING YOURSELF?: NO
2. HAVE YOU ACTUALLY HAD ANY THOUGHTS OF KILLING YOURSELF?: NO
1. IN THE PAST MONTH, HAVE YOU WISHED YOU WERE DEAD OR WISHED YOU COULD GO TO SLEEP AND NOT WAKE UP?: NO

## 2024-07-26 ASSESSMENT — PAIN SCALES - GENERAL
PAINLEVEL_OUTOF10: 0 - NO PAIN

## 2024-07-26 NOTE — NURSING NOTE
Patient returned from pre-op testing at 1525, rt radial site remains stable. Patient A/o x4, Rass=0 and denies any complaints of CP/SOB. States yes he has urinated post-procedure. Discharge packet reviewed with patient/wife, verbalized understanding. IV removed from LT AC. Patient discharged to home via w/c at 1540.

## 2024-07-26 NOTE — CONSULTS
Inpatient consult to Cardiothoracic Surgery  Consult performed by: Celena Mcdermott, JONH-CNP  Consult ordered by: JONH Jacobs-CNP  Reason for consult: Triple-vessel disease on cardiac cath performed by Dr. Guevara. Please evaluate for CABG.          Reason For Consult  Triple-vessel disease on cardiac cath performed by Dr. Guevara. Please evaluate for CABG.    History Of Present Illness  [place] Kern is a 44 y.o. male with PMHx of HTN, HLD, NID-DM2, and lifetime non-smoker who presented to [facility] for elective coronary angiogram. Over the past couple of months pt has experienced exertional chest pain. He reports pain will come on with strenuous activity such as yard work. Pain relieves with rest after a few minutes. Denies symptoms at rest. Does endorse being slightly more fatigued over past few months but not to where it has effect ADL's. He denies associated symptoms of palpitations, SOB, PND, orthopnea, peripheral edema, N/V, and priya or near syncope. He underwent nuclear stress which revealed inferior scar with possible inferior ischemia; post stress LVEF 46%; based on abnormal stress and symptoms suggestive of class III angina, coronary angiogram advised. This revealed severe triple vessel disease and cardiac surgery has been asked to evaluate patient for CABG.     Pt seen and evaluated in bed post procedure with his wife and mother at bedside. He is afebrile and HDS. Currently denies complaints. He does report significant family history of coronary disease. Findings discussed with Dr. Joann Hanson. We will obtain pre-op testing including CXR, CT chest, carotid duplex, and TTE prior to discharge today. Pt to follow up with Dr. Joann Hanson as scheduled. Pt is stable for discharge from our perspective once testing complete. Thank you for this consult.      Past Medical History  He has a past medical history of Acute bronchitis due to other specified organisms (11/26/2020), Asthma ([facility]), Diabetes (Multi),  Hyperlipidemia, Hypertension, Kidney stones, Osteoarthritis, and Personal history of other specified conditions (11/26/2020).    Surgical History  He has a past surgical history that includes Vasectomy; Multiple tooth extractions; and Hernia repair.     Social History  He reports that he has never smoked. He has never used smokeless tobacco. He reports current alcohol use. He reports that he does not use drugs.    Family History  Family History   Problem Relation Name Age of Onset    Heart disease Mother      Stroke Father      Heart attack Father          Allergies  Patient has no known allergies.    Review of Systems   Constitutional:  Positive for activity change and fatigue.   HENT: Negative.     Respiratory:  Negative for shortness of breath.    Cardiovascular:  Positive for chest pain. Negative for palpitations and leg swelling.   Gastrointestinal:  Negative for nausea.   Neurological:  Negative for dizziness and syncope.   All other systems reviewed and are negative.       Physical Exam  Constitutional:       Appearance: Normal appearance. He is normal weight.   HENT:      Head: Normocephalic and atraumatic.      Nose: Nose normal.      Mouth/Throat:      Mouth: Mucous membranes are moist.      Pharynx: Oropharynx is clear.   Eyes:      Extraocular Movements: Extraocular movements intact.      Pupils: Pupils are equal, round, and reactive to light.   Neck:      Comments: No JVD  Cardiovascular:      Rate and Rhythm: Normal rate and regular rhythm.      Pulses: Normal pulses.      Heart sounds: Normal heart sounds.      Comments: R radial access site without concern form hematoma. CSM to hand intact.   Pulmonary:      Effort: Pulmonary effort is normal.      Breath sounds: Normal breath sounds.   Abdominal:      General: Abdomen is flat.      Palpations: Abdomen is soft.      Comments: Hypoactive BS   Musculoskeletal:      Cervical back: Normal range of motion and neck supple.      Right lower leg: No edema.       Left lower leg: No edema.   Skin:     General: Skin is warm and dry.   Neurological:      General: No focal deficit present.      Mental Status: He is alert and oriented to person, place, and time.   Psychiatric:         Mood and Affect: Mood normal.         Behavior: Behavior normal.          Last Recorded Vitals  Temperature (!) 2.3 °C (36.1 °F).    Relevant Results  Scheduled medications     Continuous medications  sodium chloride 0.9%, 100 mL/hr      PRN medications      Results for orders placed or performed during the hospital encounter of 07/26/24 (from the past 24 hour(s))   ECG 12 lead STAT   Result Value Ref Range    Ventricular Rate 66 BPM    Atrial Rate 66 BPM    IN Interval 166 ms    QRS Duration 96 ms    QT Interval 394 ms    QTC Calculation(Bazett) 413 ms    P Axis 11 degrees    R Axis 1 degrees    T Axis 24 degrees    QRS Count 11 beats    Q Onset 208 ms    P Onset 125 ms    P Offset 183 ms    T Offset 405 ms    QTC Fredericia 407 ms     Cardiac Catheterization Procedure    Result Date: 7/26/2024   [facility], Cath Lab        [address] [place] [address] Cardiovascular Catheterization Report Patient Name:      HELLEN ARREOLA      Performing Physician:  Anisa Smith MD Study Date:        7/26/2024           Verifying Physician:   Anisa Smith MD MRN/PID:           [id number]            Cardiologist/Co-Scrub: Accession#:        [id number]        Ordering Provider:     Anisa SMITH Date of Birth/Age: 1980 / 44      Cardiologist:                    years Gender:            M                   Fellow: Encounter#:        [id number]          Surgeon:  Study:            Left Heart Cath Additional Study: Coronary Arteriogram Additional Study: Left Ventriculogram  Indications:  HELLEN ARREOLA is a 44 year old male who presents with diabetes, dyslipidemia, hypertension, non smoker and a chest pain assessment of atypical angina. Suspected coronary artery disease.  Procedure Description: After infiltration with 2% Lidocaine, the right radial artery was cannulated with a modified Seldinger technique. Subsequently a 5/6 slender sheath was placed in the right radial artery. Multiple injections of contrast were made into the left and right coronary arteries with angiograms recorded in multiple projections. Retrograde left heart catheterizion was accomplished with a galen Fr. pigtail catheter. A single plane left ventriculogram was recorded in the 30 degree LIN projection. The contrast dose was 20 ml injected at 10 ml/sec. The catheter was then withdrawn across the aortic valve under continuous pressure monitoring and removed.  Coronary Angiography: The coronary circulation is right dominant.  Left Main Coronary Artery: The left main coronary artery is free of atherosclerotic disease.  Left Anterior Descending Coronary Artery Distribution: The Left Anterior Descending artery is a large vessel. There is 90% stenosis in the ostial left anterior descending artery. Hemodynamically significant obstruction is noted in this vessel. There is 100% stenosis in the mid left anterior descending artery.  Circumflex Coronary Artery Distribution: The Left Circumflex artery is a medium-sized vessel. Hemodynamically significant obstruction is noted in this vessel. There is 90% stenosis in the the ostial Circumflex artery.  Right Coronary Artery Distribution: The Right Coronary Artery is a large vessel. Hemodynamically significant obstruction is noted in this vessel. There is 75% stenosis in the the proximal Right Coronary Artery. The distal right coronary artery showed 90% stenosis. Posterior Lateral Branch right coronary artery showed 90% stenosis.  Left Ventriculography: The estimated left ventricular ejection  fraction is 50%.  Hemo Personnel: +---------------+---------+ Name           Duty      +---------------+---------+ Rome GuevaraROC MD 1 +---------------+---------+  Hemodynamic Pressures:  +----+---------------+----------+-------------+--------------+-------+---------+ Site   Date Time   Phase NameSystolic mmHgDiastolic mmHgED mmHgMean mmHg +----+---------------+----------+-------------+--------------+-------+---------+   LV      7/26/2024  AIR REST          131            12     26                  10:35:59 AM                                                      +----+---------------+----------+-------------+--------------+-------+---------+  LVp      7/26/2024  AIR REST          113            14     22                  10:36:13 AM                                                      +----+---------------+----------+-------------+--------------+-------+---------+  AOp      7/26/2024  AIR REST          122             5              51         10:36:20 AM                                                      +----+---------------+----------+-------------+--------------+-------+---------+   AO      7/26/2024  AIR REST          113            85              99         10:36:42 AM                                                      +----+---------------+----------+-------------+--------------+-------+---------+  Cardiac Cath Post Procedure Notes: Post Procedure Diagnosis: Single vessel disease. Blood Loss:               Estimated blood loss during the procedure was 3 mls. Specimens Removed:        Number of specimen(s) removed: none. ____________________________________________________________________________________ CONCLUSIONS:  1. Left Main Coronary Artery: This artery is normal.  2. Left Anterior Descending Artery: is significantly obstructed.  3. Ostial LAD Lesion: The percent stenosis is 90%.  4. Mid LAD Lesion: The percent  stenosis is 100%.  5. Circumflex Coronary Artery: significantly obstructed.  6. Ostial CX Lesion: The percent stenosis is 90%.  7. Right Coronary Artery: significantly obstructed.  8. Proximal RCA Lesion: The percent stenosis is 75%.  9. Distal RCA Lesion: The percent stenosis is 90%. 10. Evaluate for coronary artery bypass surgery. 11. Posterior Lateral Branch RCA Lesion: The percent stenosis is 90%. 12. The Left Ventricular Ejection Fraction is 50%. ICD 10 Codes: Angina pectoris, unspecified-I20.9  CPT Codes: Left Heart Cath (visualization of coronaries) and LV-69697; Moderate Sedation Services 2nd additional 15 minutes patient >5 years-05043  [id number] Guevara MD Performing Physician Electronically signed by Anisa Guevara MD on 7/26/2024 at 12:26:33 PM  ** Final **     ECG 12 lead STAT    Result Date: 7/26/2024  Normal sinus rhythm Inferior infarct (cited on or before 23-NOV-2020) Abnormal ECG When compared with ECG of 23-NOV-2020 11:59, Criteria for Anterior infarct are no longer Present QT has shortened        Assessment/Plan     Coronary Artery Disease; Unstable Angina  Pt underwent nuclear stress for evaluation of 2 month long exertional chest pain. This revealed inferior scar and possible inferior ischemia and coronary angiogram advised.  LHC revealed severe triple vessel disease and patient was referred for consideration of surgical revascularization.   -Discussed with Dr. Joann Hanson  -Pt is stable to discharge and be seen in clinic  -will obtain pre-op testing including CXR, CT chest, carotid duplex, and echo prior to discharge  -Continue ASA and Statin  -Toprol XL 50mg daily  -antianginal per Cardiology  -Pt to follow up with Dr. Joann Hanson as scheduled. He is stable to discharge from our perspective once testing completed.   -Thank you for this consult.    HTN; HLD  -Toprol XL 50mg daily  -Lisinopril 40mg daily; will need held 48 hours prior to CABG for vasoplegia prophylaxis  -Statin therapy  -Lipid  panel prior to surgery    NID-DM2; Neuropathy   Hgb A1C 6.9% (11/2023)  -Continue home Glimepiride  -Continue home Neurontin regimen    -Hgb A1C prior to surgery     Lifetime Non-Smoker       I spent 60 minutes in the professional and overall care of this patient.

## 2024-07-26 NOTE — POST-PROCEDURE NOTE
Severe triple-vessel coronary disease physician Transition of Care Summary  Invasive Cardiovascular Lab    Procedure Date: 7/26/2024  Attending:    * Rome Guevara - Primary  Resident/Fellow/Other Assistant: Surgeons and Role:  * No surgeons found with a matching role *    Indications:   Pre-op Diagnosis      * Abnormal stress test [R94.39]     * Angina pectoris, unstable (Multi) [I20.0]    Post-procedure diagnosis:   Post-op Diagnosis     * Abnormal stress test [R94.39]     * Angina pectoris, unstable (Multi) [I20.0]    Procedure(s):   Left Heart Cath  22775 - GA CATH PLMT L HRT & ARTS W/NJX & ANGIO IMG S&I        Procedure Findings:   Severe triple-vessel coronary disease with 90% stenosis of ostial LAD, occluded distal LAD with collaterals, 90% stenosis of ostial circumflex, 80% stenosis of mid right coronary artery with 90% stenosis of distal right coronary artery and proximal left ventricular branch, normal left ventricular function,    Description of the Procedure:   Left heart catheterization coronary angiography left ventriculogram    Complications:   None    Stents/Implants:   Implants       No implant documentation for this case.            Anticoagulation/Antiplatelet Plan:   Intravenous heparin    Estimated Blood Loss:   * No values recorded between 7/26/2024 10:11 AM and 7/26/2024 10:43 AM *    Anesthesia: Moderate Sedation Anesthesia Staff: No anesthesia staff entered.    Any Specimen(s) Removed:   No specimens collected during this procedure.    Disposition:   Referred to cardiothoracic surgery for possible coronary artery bypass surgery can be done as an outpatient      Electronically signed by: Rome Guevara MD, 7/26/2024 10:43 AM

## 2024-07-26 NOTE — PROGRESS NOTES
Reviewed findings of cardiac catheterization, pictures of coronary arteries, and recommendation for referral to cardiothoracic surgery for evaluation for coronary artery bypass surgery with patient and his family members who are at the bedside in the postprocedure unit.  Patient underwent cardiac catheterization performed by Dr. Guevara with findings showing severe triple-vessel coronary disease and normal LV function, see his report for full summary.  He is recommended to have referral to cardiothoracic surgery.  Will continue his current medications, and increase atorvastatin to high intensity strength.  Discussed postprocedure activity restrictions and advised to refrain from strenuous activity given cardiac catheterization findings. They verbalized understanding of information.

## 2024-07-26 NOTE — NURSING NOTE
Handoff report received from off-going RNTiffany. Patient is S/P LHC, shows TVD and needs CABG. Referral made to CT service. Patient presently off unit having pre-op testing completed prior to his discharge. Rt radial site had D-stat initially after heart cath, but band has been off and site stable since 1215. Orders reviewed, patient may be discharged once pre-op testing is completed and is site remains stable.

## 2024-07-26 NOTE — SIGNIFICANT EVENT
Pt back from cath lab, right radial site stable, no bleeding or hematoma noted, D-stat device in place, pt awake and alert, no complaints of pain at this time

## 2024-07-26 NOTE — DISCHARGE INSTRUCTIONS

## 2024-07-29 ENCOUNTER — OFFICE VISIT (OUTPATIENT)
Dept: CARDIAC SURGERY | Facility: CLINIC | Age: 44
End: 2024-07-29
Payer: COMMERCIAL

## 2024-07-29 VITALS
HEIGHT: 69 IN | DIASTOLIC BLOOD PRESSURE: 92 MMHG | BODY MASS INDEX: 32.32 KG/M2 | WEIGHT: 218.2 LBS | HEART RATE: 77 BPM | TEMPERATURE: 97.4 F | SYSTOLIC BLOOD PRESSURE: 142 MMHG | OXYGEN SATURATION: 100 %

## 2024-07-29 DIAGNOSIS — I25.119 CORONARY ARTERY DISEASE INVOLVING NATIVE CORONARY ARTERY OF NATIVE HEART WITH ANGINA PECTORIS (CMS-HCC): ICD-10-CM

## 2024-07-29 DIAGNOSIS — I25.10 CORONARY ARTERY DISEASE INVOLVING NATIVE CORONARY ARTERY OF NATIVE HEART, UNSPECIFIED WHETHER ANGINA PRESENT: Primary | ICD-10-CM

## 2024-07-29 DIAGNOSIS — I20.9 ANGINA, CLASS III (CMS-HCC): ICD-10-CM

## 2024-07-29 LAB
ATRIAL RATE: 66 BPM
P AXIS: 11 DEGREES
P OFFSET: 183 MS
P ONSET: 125 MS
PR INTERVAL: 166 MS
Q ONSET: 208 MS
QRS COUNT: 11 BEATS
QRS DURATION: 96 MS
QT INTERVAL: 394 MS
QTC CALCULATION(BAZETT): 413 MS
QTC FREDERICIA: 407 MS
R AXIS: 1 DEGREES
T AXIS: 24 DEGREES
T OFFSET: 405 MS
VENTRICULAR RATE: 66 BPM

## 2024-07-29 PROCEDURE — 3008F BODY MASS INDEX DOCD: CPT | Performed by: THORACIC SURGERY (CARDIOTHORACIC VASCULAR SURGERY)

## 2024-07-29 PROCEDURE — 99205 OFFICE O/P NEW HI 60 MIN: CPT | Performed by: THORACIC SURGERY (CARDIOTHORACIC VASCULAR SURGERY)

## 2024-07-29 PROCEDURE — 4010F ACE/ARB THERAPY RXD/TAKEN: CPT | Performed by: THORACIC SURGERY (CARDIOTHORACIC VASCULAR SURGERY)

## 2024-07-29 PROCEDURE — 1036F TOBACCO NON-USER: CPT | Performed by: THORACIC SURGERY (CARDIOTHORACIC VASCULAR SURGERY)

## 2024-07-29 PROCEDURE — 3080F DIAST BP >= 90 MM HG: CPT | Performed by: THORACIC SURGERY (CARDIOTHORACIC VASCULAR SURGERY)

## 2024-07-29 PROCEDURE — 99215 OFFICE O/P EST HI 40 MIN: CPT | Mod: 57 | Performed by: THORACIC SURGERY (CARDIOTHORACIC VASCULAR SURGERY)

## 2024-07-29 PROCEDURE — 3077F SYST BP >= 140 MM HG: CPT | Performed by: THORACIC SURGERY (CARDIOTHORACIC VASCULAR SURGERY)

## 2024-07-29 SDOH — ECONOMIC STABILITY: FOOD INSECURITY: WITHIN THE PAST 12 MONTHS, YOU WORRIED THAT YOUR FOOD WOULD RUN OUT BEFORE YOU GOT MONEY TO BUY MORE.: NEVER TRUE

## 2024-07-29 SDOH — ECONOMIC STABILITY: FOOD INSECURITY: WITHIN THE PAST 12 MONTHS, THE FOOD YOU BOUGHT JUST DIDN'T LAST AND YOU DIDN'T HAVE MONEY TO GET MORE.: NEVER TRUE

## 2024-07-29 ASSESSMENT — LIFESTYLE VARIABLES
HOW OFTEN DO YOU HAVE SIX OR MORE DRINKS ON ONE OCCASION: NEVER
AUDIT-C TOTAL SCORE: 3
HOW OFTEN DO YOU HAVE A DRINK CONTAINING ALCOHOL: 2-4 TIMES A MONTH
SKIP TO QUESTIONS 9-10: 0
HOW MANY STANDARD DRINKS CONTAINING ALCOHOL DO YOU HAVE ON A TYPICAL DAY: 3 OR 4

## 2024-07-29 ASSESSMENT — PATIENT HEALTH QUESTIONNAIRE - PHQ9
2. FEELING DOWN, DEPRESSED OR HOPELESS: NOT AT ALL
SUM OF ALL RESPONSES TO PHQ9 QUESTIONS 1 AND 2: 0
1. LITTLE INTEREST OR PLEASURE IN DOING THINGS: NOT AT ALL

## 2024-07-29 ASSESSMENT — PAIN SCALES - GENERAL: PAINLEVEL: 0-NO PAIN

## 2024-07-29 ASSESSMENT — ENCOUNTER SYMPTOMS
DEPRESSION: 0
LOSS OF SENSATION IN FEET: 0
OCCASIONAL FEELINGS OF UNSTEADINESS: 0

## 2024-07-29 NOTE — LETTER
July 29, 2024     Rome Guevara MD  [address], [address]  [facility] [address]    Patient: Montana Kern   YOB: 1980   Date of Visit: 7/29/2024       Dear Dr. Rome Guevara MD:    Thank you for referring Montana Kern to me for evaluation. Below are my notes for this consultation.  If you have questions, please do not hesitate to call me. I look forward to following your patient along with you.       Sincerely,     Bernabe Zhao MD      CC: Darlin Echevarria, APRN-CNP  Leonardo Muller, DO  ______________________________________________________________________________________    Chief Complaint  Dyspnea and Chest Pain    HPI:   Mr. Montana Kern is an 44 y.o. male, who is a patient of Dr. Leonardo Muller, DO  Dr. Rome Guevara did his C.   I have been asked to see them by Darlin Anderson to evaluate multivessel coronary artery disease.  He gets angina symptoms with  cutting grass, and going up stairs to the bedroom. He got chest pain today running in from the rain, to the office.       Past Medical History:   Diagnosis Date   • Acute bronchitis due to other specified organisms 11/26/2020    Acute bronchitis due to other specified organisms   • Asthma ([facility])    • Diabetes (Multi)     type 2   • Hyperlipidemia    • Hypertension    • Kidney stones     passed on own   • Osteoarthritis     ankles   • Personal history of other specified conditions 11/26/2020    History of persistent cough       Past Surgical History:   Procedure Laterality Date   • HERNIA REPAIR      x 3   • MULTIPLE TOOTH EXTRACTIONS     • VASECTOMY         Family History   Problem Relation Name Age of Onset   • Heart disease Mother     • Stroke Father     • Heart attack Father         Social History     Socioeconomic History   • Marital status:      Spouse name: Not on file   • Number of children: Not on file   • Years of education: Not on file   • Highest education level: Not on file   Occupational  History   • Not on file   Tobacco Use   • Smoking status: Never   • Smokeless tobacco: Never   Vaping Use   • Vaping status: Never Used   Substance and Sexual Activity   • Alcohol use: Yes     Comment: occasionally   • Drug use: Never   • Sexual activity: Defer   Other Topics Concern   • Not on file   Social History Narrative   • Not on file     Social Determinants of Health     Financial Resource Strain: Not on file   Food Insecurity: Not on file   Transportation Needs: Not on file   Physical Activity: Not on file   Stress: Not on file   Social Connections: Not on file   Intimate Partner Violence: Not on file   Housing Stability: Not on file       No Known Allergies    Outpatient Encounter Medications as of 7/29/2024   Medication Sig Dispense Refill   • aspirin 81 mg EC tablet Take 1 tablet (81 mg) by mouth once daily.     • atorvastatin (Lipitor) 80 mg tablet Take 1 tablet (80 mg) by mouth once daily. 30 tablet 1   • gabapentin (Neurontin) 100 mg capsule Take 1 capsule (100 mg) by mouth 3 times a day. If needed for anterior right ankle pain 90 capsule 5   • gabapentin (Neurontin) 300 mg capsule Take 1 capsule (300 mg) by mouth 3 times a day. (Patient taking differently: Take 1 capsule (300 mg) by mouth 2 times a day.) 270 capsule 3   • glimepiride (Amaryl) 4 mg tablet TAKE 1/2 TABLET BY MOUTH TWICE DAILY BEFORE A MEAL AND 1 TABLET BY MOUTH BEFORE DINNER 180 tablet 3   • lisinopril 40 mg tablet Take 1 tablet (40 mg) by mouth once daily. 90 tablet 3   • metFORMIN (Glucophage) 1,000 mg tablet Take 1 tablet (1,000 mg) by mouth 2 times a day with meals. 180 tablet 3   • metoprolol succinate XL (Toprol-XL) 50 mg 24 hr tablet Take 1 tablet (50 mg) by mouth once daily. Do not crush or chew.     • nitroglycerin (Nitrostat) 0.4 mg SL tablet Place 1 tablet (0.4 mg) under the tongue every 5 minutes if needed for chest pain. May repeat dose every 5 minutes for up to 3 doses total. 25 tablet 5   • predniSONE (Deltasone) 20 mg  tablet Take 1 tab bid today, then 1 daily (Patient not taking: Reported on 7/19/2024) 8 tablet 0   • semaglutide (Ozempic) 1 mg/dose (4 mg/3 mL) pen injector Inject 1 mg under the skin 1 (one) time per week. Wednesday 9 mL 2   • [DISCONTINUED] atorvastatin (Lipitor) 20 mg tablet Take 1 tablet (20 mg) by mouth once daily at bedtime. 90 tablet 3   • [DISCONTINUED] metoprolol succinate XL (Toprol-XL) 25 mg 24 hr tablet Take 1 tablet (25 mg) by mouth once daily. (Patient taking differently: Take 2 tablets (50 mg) by mouth once daily. Every evening) 90 tablet 3   • [DISCONTINUED] metoprolol succinate XL (Toprol-XL) 50 mg 24 hr tablet Take 1 tablet (50 mg) by mouth once daily. Do not crush or chew. (Patient not taking: Reported on 7/26/2024) 30 tablet 2     No facility-administered encounter medications on file as of 7/29/2024.       Physical Exam  Constitutional:       Appearance: Normal appearance. He is normal weight.   HENT:      Head: Normocephalic.   Eyes:      General: Scleral icterus present.      Pupils: Pupils are equal, round, and reactive to light.   Cardiovascular:      Rate and Rhythm: Normal rate and regular rhythm.      Heart sounds: No murmur heard.  Pulmonary:      Effort: Pulmonary effort is normal.      Breath sounds: Normal breath sounds.   Abdominal:      General: Abdomen is flat.      Palpations: Abdomen is soft.   Musculoskeletal:         General: Normal range of motion.   Skin:     General: Skin is warm and dry.   Neurological:      General: No focal deficit present.      Mental Status: He is alert.   Psychiatric:         Mood and Affect: Mood normal.       Lab Results   Component Value Date    WBC 5.5 07/22/2024    HGB 13.3 (L) 07/22/2024    HCT 40.6 (L) 07/22/2024    MCV 83 07/22/2024     07/22/2024     Lab Results   Component Value Date    GLUCOSE 250 (H) 07/22/2024    CALCIUM 9.0 07/22/2024     07/22/2024    K 4.2 07/22/2024    CO2 29 07/22/2024     07/22/2024    BUN 17  07/22/2024    CREATININE 0.91 07/22/2024       Encounter Date: 07/26/24   ECG 12 lead STAT   Result Value    Ventricular Rate 66    Atrial Rate 66    NM Interval 166    QRS Duration 96    QT Interval 394    QTC Calculation(Bazett) 413    P Axis 11    R Axis 1    T Axis 24    QRS Count 11    Q Onset 208    P Onset 125    P Offset 183    T Offset 405    QTC Fredericia 407    Narrative    Normal sinus rhythm  Inferior infarct (cited on or before 23-NOV-2020)  Abnormal ECG  When compared with ECG of 23-NOV-2020 11:59,  Criteria for Anterior infarct are no longer Present  QT has shortened  Confirmed by Kei Wyatt ([id number]) on 7/29/2024 6:58:38 AM     ECHO: 7/26/24: LVEF 55-60%;  inferior septal and anterior septal hypokinesis.  RV is normal in size and function.  There are no valvular abnormalities. LA is dilated 5.6 x 5.5 cm.      LHC: 7/26/24: Right dominant coronary circulation. Proximal LAD with 90% stenosis and mid 100% occlusion. LAD fills with L to L collaterals.   Lcx with 90% ostial stenosis, OM1 is a good surgical target.  The RCA shows significant  serial 75% proximal, 90% mid and distal 90% stenosis.  The PLV has ostial 80% stenosis.      Carotid Duplex: Less 50% bilaterally with no flow abnormalities in vertebral or subclavian arteries.     CT Chest 7/26/24:  Normal aortic arch branching pattern.  No significant aortic calcifications.  Severe coronary calcifications.  Normal cardiac shape size.  No significant lung disease.     Assessment and Plan:   Mr. Montana Kern is an 44 y.o. male who presents with multivessel coronary artery disease.  This is associated with HTN, HLD, DMII; and in the setting of OA (ankles).  Their symptoms include Dyspnea and Chest Pain.  He has CCS class III angina and NYHA I heart failure symptoms. Their imaging is consistent with multivessel coronary artery disease.     We had a nice discussion regarding the indications for intervention on their coronary artery disease.  This  included percutaneous as well as open surgical approaches. I do believe that surgical coronary revascularization is in his best interest, given the coronary anatomy and presentation of his symptoms.  He understands that the goal of this procedure will be to relieve symptoms if present, preserve left ventricular function, prevent future myocardial infarction, and prolong life.  I discussed the specific risks of bleeding, infection, transfusion, myocardial infarction, stroke, renal failure, up to and including death.  In further evaluation we will obtain Vein mapping and Upper extremity duplex and he will be seen/evaluated by PAT.     I have discussed with him possible enrollment in one of our clinical trials.     They have agreed to surgery and signed to electronic consent form here in the office.      My operative plan will be for on-pump, arrested heart, surgical coronary revascularization.  My cardiopulmonary bypass cannulation strategy will be with aortic cannulation via an EOPA cannula in the distal ascending aorta and venous return with a dual stage cannula via the right atrium.  My cardioplegia strategy will include initial arrest with non-selective antegrade cold blood microplegia via the aortic root, followed by antegrade and retrograde delivery via conduits and retrograde coronary sinus cannula.  My grafting strategy will include LIMA to LAD, SVG to RPDA and RA or MURALI to OM2 or OM3.   .

## 2024-07-29 NOTE — PROGRESS NOTES
Chief Complaint  Dyspnea and Chest Pain    HPI:   Mr. Montana Kern is an 44 y.o. male, who is a patient of Dr. Leonardo Muller, DO Dr. Rome Guevara did his [facility].   I have been asked to see them by Darlin Anderson to evaluate multivessel coronary artery disease.  He gets angina symptoms with  cutting grass, and going up stairs to the bedroom. He got chest pain today running in from the rain, to the office.       Past Medical History:   Diagnosis Date   • Acute bronchitis due to other specified organisms 11/26/2020    Acute bronchitis due to other specified organisms   • Asthma ([facility])    • Diabetes (Multi)     type 2   • Hyperlipidemia    • Hypertension    • Kidney stones     passed on own   • Osteoarthritis     ankles   • Personal history of other specified conditions 11/26/2020    History of persistent cough       Past Surgical History:   Procedure Laterality Date   • HERNIA REPAIR      x 3   • MULTIPLE TOOTH EXTRACTIONS     • VASECTOMY         Family History   Problem Relation Name Age of Onset   • Heart disease Mother     • Stroke Father     • Heart attack Father         Social History     Socioeconomic History   • Marital status:      Spouse name: Not on file   • Number of children: Not on file   • Years of education: Not on file   • Highest education level: Not on file   Occupational History   • Not on file   Tobacco Use   • Smoking status: Never   • Smokeless tobacco: Never   Vaping Use   • Vaping status: Never Used   Substance and Sexual Activity   • Alcohol use: Yes     Comment: occasionally   • Drug use: Never   • Sexual activity: Defer   Other Topics Concern   • Not on file   Social History Narrative   • Not on file     Social Determinants of Health     Financial Resource Strain: Not on file   Food Insecurity: Not on file   Transportation Needs: Not on file   Physical Activity: Not on file   Stress: Not on file   Social Connections: Not on file   Intimate Partner Violence: Not on file   Housing  Stability: Not on file       No Known Allergies    Outpatient Encounter Medications as of 7/29/2024   Medication Sig Dispense Refill   • aspirin 81 mg EC tablet Take 1 tablet (81 mg) by mouth once daily.     • atorvastatin (Lipitor) 80 mg tablet Take 1 tablet (80 mg) by mouth once daily. 30 tablet 1   • gabapentin (Neurontin) 100 mg capsule Take 1 capsule (100 mg) by mouth 3 times a day. If needed for anterior right ankle pain 90 capsule 5   • gabapentin (Neurontin) 300 mg capsule Take 1 capsule (300 mg) by mouth 3 times a day. (Patient taking differently: Take 1 capsule (300 mg) by mouth 2 times a day.) 270 capsule 3   • glimepiride (Amaryl) 4 mg tablet TAKE 1/2 TABLET BY MOUTH TWICE DAILY BEFORE A MEAL AND 1 TABLET BY MOUTH BEFORE DINNER 180 tablet 3   • lisinopril 40 mg tablet Take 1 tablet (40 mg) by mouth once daily. 90 tablet 3   • metFORMIN (Glucophage) 1,000 mg tablet Take 1 tablet (1,000 mg) by mouth 2 times a day with meals. 180 tablet 3   • metoprolol succinate XL (Toprol-XL) 50 mg 24 hr tablet Take 1 tablet (50 mg) by mouth once daily. Do not crush or chew.     • nitroglycerin (Nitrostat) 0.4 mg SL tablet Place 1 tablet (0.4 mg) under the tongue every 5 minutes if needed for chest pain. May repeat dose every 5 minutes for up to 3 doses total. 25 tablet 5   • predniSONE (Deltasone) 20 mg tablet Take 1 tab bid today, then 1 daily (Patient not taking: Reported on 7/19/2024) 8 tablet 0   • semaglutide (Ozempic) 1 mg/dose (4 mg/3 mL) pen injector Inject 1 mg under the skin 1 (one) time per week. Wednesday 9 mL 2   • [DISCONTINUED] atorvastatin (Lipitor) 20 mg tablet Take 1 tablet (20 mg) by mouth once daily at bedtime. 90 tablet 3   • [DISCONTINUED] metoprolol succinate XL (Toprol-XL) 25 mg 24 hr tablet Take 1 tablet (25 mg) by mouth once daily. (Patient taking differently: Take 2 tablets (50 mg) by mouth once daily. Every evening) 90 tablet 3   • [DISCONTINUED] metoprolol succinate XL (Toprol-XL) 50 mg 24 hr  tablet Take 1 tablet (50 mg) by mouth once daily. Do not crush or chew. (Patient not taking: Reported on 7/26/2024) 30 tablet 2     No facility-administered encounter medications on file as of 7/29/2024.       Physical Exam  Constitutional:       Appearance: Normal appearance. He is normal weight.   HENT:      Head: Normocephalic.   Eyes:      General: Scleral icterus present.      Pupils: Pupils are equal, round, and reactive to light.   Cardiovascular:      Rate and Rhythm: Normal rate and regular rhythm.      Heart sounds: No murmur heard.  Pulmonary:      Effort: Pulmonary effort is normal.      Breath sounds: Normal breath sounds.   Abdominal:      General: Abdomen is flat.      Palpations: Abdomen is soft.   Musculoskeletal:         General: Normal range of motion.   Skin:     General: Skin is warm and dry.   Neurological:      General: No focal deficit present.      Mental Status: He is alert.   Psychiatric:         Mood and Affect: Mood normal.       Lab Results   Component Value Date    WBC 5.5 07/22/2024    HGB 13.3 (L) 07/22/2024    HCT 40.6 (L) 07/22/2024    MCV 83 07/22/2024     07/22/2024     Lab Results   Component Value Date    GLUCOSE 250 (H) 07/22/2024    CALCIUM 9.0 07/22/2024     07/22/2024    K 4.2 07/22/2024    CO2 29 07/22/2024     07/22/2024    BUN 17 07/22/2024    CREATININE 0.91 07/22/2024       Encounter Date: 07/26/24   ECG 12 lead STAT   Result Value    Ventricular Rate 66    Atrial Rate 66    MD Interval 166    QRS Duration 96    QT Interval 394    QTC Calculation(Bazett) 413    P Axis 11    R Axis 1    T Axis 24    QRS Count 11    Q Onset 208    P Onset 125    P Offset 183    T Offset 405    QTC Fredericia 407    Narrative    Normal sinus rhythm  Inferior infarct (cited on or before 23-NOV-2020)  Abnormal ECG  When compared with ECG of 23-NOV-2020 11:59,  Criteria for Anterior infarct are no longer Present  QT has shortened  Confirmed by Kei Wyatt ([id number]) on  7/29/2024 6:58:38 AM     ECHO: 7/26/24: LVEF 55-60%;  inferior septal and anterior septal hypokinesis.  RV is normal in size and function.  There are no valvular abnormalities. LA is dilated 5.6 x 5.5 cm.      LHC: 7/26/24: Right dominant coronary circulation. Proximal LAD with 90% stenosis and mid 100% occlusion. LAD fills with L to L collaterals.   Lcx with 90% ostial stenosis, OM1 is a good surgical target.  The RCA shows significant  serial 75% proximal, 90% mid and distal 90% stenosis.  The PLV has ostial 80% stenosis.      Carotid Duplex: Less 50% bilaterally with no flow abnormalities in vertebral or subclavian arteries.     CT Chest 7/26/24:  Normal aortic arch branching pattern.  No significant aortic calcifications.  Severe coronary calcifications.  Normal cardiac shape size.  No significant lung disease.     Assessment and Plan:   Mr. Montana Kern is an 44 y.o. male who presents with multivessel coronary artery disease.  This is associated with HTN, HLD, DMII; and in the setting of OA (ankles).  Their symptoms include Dyspnea and Chest Pain.  He has CCS class III angina and NYHA I heart failure symptoms. Their imaging is consistent with multivessel coronary artery disease.     We had a nice discussion regarding the indications for intervention on their coronary artery disease.  This included percutaneous as well as open surgical approaches. I do believe that surgical coronary revascularization is in his best interest, given the coronary anatomy and presentation of his symptoms.  He understands that the goal of this procedure will be to relieve symptoms if present, preserve left ventricular function, prevent future myocardial infarction, and prolong life.  I discussed the specific risks of bleeding, infection, transfusion, myocardial infarction, stroke, renal failure, up to and including death.  In further evaluation we will obtain Vein mapping and Upper extremity duplex and he will be seen/evaluated by  PAULIE.     I have discussed with him possible enrollment in one of our clinical trials.     They have agreed to surgery and signed to electronic consent form here in the office.      My operative plan will be for on-pump, arrested heart, surgical coronary revascularization.  My cardiopulmonary bypass cannulation strategy will be with aortic cannulation via an EOPA cannula in the distal ascending aorta and venous return with a dual stage cannula via the right atrium.  My cardioplegia strategy will include initial arrest with non-selective antegrade cold blood microplegia via the aortic root, followed by antegrade and retrograde delivery via conduits and retrograde coronary sinus cannula.  My grafting strategy will include LIMA to LAD, SVG to RPDA and RA or MURALI to OM2 or OM3.   .

## 2024-07-29 NOTE — H&P (VIEW-ONLY)
Chief Complaint  Dyspnea and Chest Pain    HPI:   Mr. Montana Kern is an 44 y.o. male, who is a patient of Dr. Leonardo Muller, DO Dr. Rome Guevara did his [facility].   I have been asked to see them by Darlin Anderson to evaluate multivessel coronary artery disease.  He gets angina symptoms with  cutting grass, and going up stairs to the bedroom. He got chest pain today running in from the rain, to the office.       Past Medical History:   Diagnosis Date   • Acute bronchitis due to other specified organisms 11/26/2020    Acute bronchitis due to other specified organisms   • Asthma ([facility])    • Diabetes (Multi)     type 2   • Hyperlipidemia    • Hypertension    • Kidney stones     passed on own   • Osteoarthritis     ankles   • Personal history of other specified conditions 11/26/2020    History of persistent cough       Past Surgical History:   Procedure Laterality Date   • HERNIA REPAIR      x 3   • MULTIPLE TOOTH EXTRACTIONS     • VASECTOMY         Family History   Problem Relation Name Age of Onset   • Heart disease Mother     • Stroke Father     • Heart attack Father         Social History     Socioeconomic History   • Marital status:      Spouse name: Not on file   • Number of children: Not on file   • Years of education: Not on file   • Highest education level: Not on file   Occupational History   • Not on file   Tobacco Use   • Smoking status: Never   • Smokeless tobacco: Never   Vaping Use   • Vaping status: Never Used   Substance and Sexual Activity   • Alcohol use: Yes     Comment: occasionally   • Drug use: Never   • Sexual activity: Defer   Other Topics Concern   • Not on file   Social History Narrative   • Not on file     Social Determinants of Health     Financial Resource Strain: Not on file   Food Insecurity: Not on file   Transportation Needs: Not on file   Physical Activity: Not on file   Stress: Not on file   Social Connections: Not on file   Intimate Partner Violence: Not on file   Housing  Stability: Not on file       No Known Allergies    Outpatient Encounter Medications as of 7/29/2024   Medication Sig Dispense Refill   • aspirin 81 mg EC tablet Take 1 tablet (81 mg) by mouth once daily.     • atorvastatin (Lipitor) 80 mg tablet Take 1 tablet (80 mg) by mouth once daily. 30 tablet 1   • gabapentin (Neurontin) 100 mg capsule Take 1 capsule (100 mg) by mouth 3 times a day. If needed for anterior right ankle pain 90 capsule 5   • gabapentin (Neurontin) 300 mg capsule Take 1 capsule (300 mg) by mouth 3 times a day. (Patient taking differently: Take 1 capsule (300 mg) by mouth 2 times a day.) 270 capsule 3   • glimepiride (Amaryl) 4 mg tablet TAKE 1/2 TABLET BY MOUTH TWICE DAILY BEFORE A MEAL AND 1 TABLET BY MOUTH BEFORE DINNER 180 tablet 3   • lisinopril 40 mg tablet Take 1 tablet (40 mg) by mouth once daily. 90 tablet 3   • metFORMIN (Glucophage) 1,000 mg tablet Take 1 tablet (1,000 mg) by mouth 2 times a day with meals. 180 tablet 3   • metoprolol succinate XL (Toprol-XL) 50 mg 24 hr tablet Take 1 tablet (50 mg) by mouth once daily. Do not crush or chew.     • nitroglycerin (Nitrostat) 0.4 mg SL tablet Place 1 tablet (0.4 mg) under the tongue every 5 minutes if needed for chest pain. May repeat dose every 5 minutes for up to 3 doses total. 25 tablet 5   • predniSONE (Deltasone) 20 mg tablet Take 1 tab bid today, then 1 daily (Patient not taking: Reported on 7/19/2024) 8 tablet 0   • semaglutide (Ozempic) 1 mg/dose (4 mg/3 mL) pen injector Inject 1 mg under the skin 1 (one) time per week. Wednesday 9 mL 2   • [DISCONTINUED] atorvastatin (Lipitor) 20 mg tablet Take 1 tablet (20 mg) by mouth once daily at bedtime. 90 tablet 3   • [DISCONTINUED] metoprolol succinate XL (Toprol-XL) 25 mg 24 hr tablet Take 1 tablet (25 mg) by mouth once daily. (Patient taking differently: Take 2 tablets (50 mg) by mouth once daily. Every evening) 90 tablet 3   • [DISCONTINUED] metoprolol succinate XL (Toprol-XL) 50 mg 24 hr  tablet Take 1 tablet (50 mg) by mouth once daily. Do not crush or chew. (Patient not taking: Reported on 7/26/2024) 30 tablet 2     No facility-administered encounter medications on file as of 7/29/2024.       Physical Exam  Constitutional:       Appearance: Normal appearance. He is normal weight.   HENT:      Head: Normocephalic.   Eyes:      General: Scleral icterus present.      Pupils: Pupils are equal, round, and reactive to light.   Cardiovascular:      Rate and Rhythm: Normal rate and regular rhythm.      Heart sounds: No murmur heard.  Pulmonary:      Effort: Pulmonary effort is normal.      Breath sounds: Normal breath sounds.   Abdominal:      General: Abdomen is flat.      Palpations: Abdomen is soft.   Musculoskeletal:         General: Normal range of motion.   Skin:     General: Skin is warm and dry.   Neurological:      General: No focal deficit present.      Mental Status: He is alert.   Psychiatric:         Mood and Affect: Mood normal.       Lab Results   Component Value Date    WBC 5.5 07/22/2024    HGB 13.3 (L) 07/22/2024    HCT 40.6 (L) 07/22/2024    MCV 83 07/22/2024     07/22/2024     Lab Results   Component Value Date    GLUCOSE 250 (H) 07/22/2024    CALCIUM 9.0 07/22/2024     07/22/2024    K 4.2 07/22/2024    CO2 29 07/22/2024     07/22/2024    BUN 17 07/22/2024    CREATININE 0.91 07/22/2024       Encounter Date: 07/26/24   ECG 12 lead STAT   Result Value    Ventricular Rate 66    Atrial Rate 66    RI Interval 166    QRS Duration 96    QT Interval 394    QTC Calculation(Bazett) 413    P Axis 11    R Axis 1    T Axis 24    QRS Count 11    Q Onset 208    P Onset 125    P Offset 183    T Offset 405    QTC Fredericia 407    Narrative    Normal sinus rhythm  Inferior infarct (cited on or before 23-NOV-2020)  Abnormal ECG  When compared with ECG of 23-NOV-2020 11:59,  Criteria for Anterior infarct are no longer Present  QT has shortened  Confirmed by Kei Wyatt ([id number]) on  7/29/2024 6:58:38 AM     ECHO: 7/26/24: LVEF 55-60%;  inferior septal and anterior septal hypokinesis.  RV is normal in size and function.  There are no valvular abnormalities. LA is dilated 5.6 x 5.5 cm.      LHC: 7/26/24: Right dominant coronary circulation. Proximal LAD with 90% stenosis and mid 100% occlusion. LAD fills with L to L collaterals.   Lcx with 90% ostial stenosis, OM1 is a good surgical target.  The RCA shows significant  serial 75% proximal, 90% mid and distal 90% stenosis.  The PLV has ostial 80% stenosis.      Carotid Duplex: Less 50% bilaterally with no flow abnormalities in vertebral or subclavian arteries.     CT Chest 7/26/24:  Normal aortic arch branching pattern.  No significant aortic calcifications.  Severe coronary calcifications.  Normal cardiac shape size.  No significant lung disease.     Assessment and Plan:   Mr. Montana Kern is an 44 y.o. male who presents with multivessel coronary artery disease.  This is associated with HTN, HLD, DMII; and in the setting of OA (ankles).  Their symptoms include Dyspnea and Chest Pain.  He has CCS class III angina and NYHA I heart failure symptoms. Their imaging is consistent with multivessel coronary artery disease.     We had a nice discussion regarding the indications for intervention on their coronary artery disease.  This included percutaneous as well as open surgical approaches. I do believe that surgical coronary revascularization is in his best interest, given the coronary anatomy and presentation of his symptoms.  He understands that the goal of this procedure will be to relieve symptoms if present, preserve left ventricular function, prevent future myocardial infarction, and prolong life.  I discussed the specific risks of bleeding, infection, transfusion, myocardial infarction, stroke, renal failure, up to and including death.  In further evaluation we will obtain Vein mapping and Upper extremity duplex and he will be seen/evaluated by  PAULIE.     I have discussed with him possible enrollment in one of our clinical trials.     They have agreed to surgery and signed to electronic consent form here in the office.      My operative plan will be for on-pump, arrested heart, surgical coronary revascularization.  My cardiopulmonary bypass cannulation strategy will be with aortic cannulation via an EOPA cannula in the distal ascending aorta and venous return with a dual stage cannula via the right atrium.  My cardioplegia strategy will include initial arrest with non-selective antegrade cold blood microplegia via the aortic root, followed by antegrade and retrograde delivery via conduits and retrograde coronary sinus cannula.  My grafting strategy will include LIMA to LAD, SVG to RPDA and RA or MURALI to OM2 or OM3.   .

## 2024-07-30 PROBLEM — I25.10 CORONARY ARTERY DISEASE INVOLVING NATIVE CORONARY ARTERY OF NATIVE HEART: Status: ACTIVE | Noted: 2024-07-29

## 2024-08-01 ENCOUNTER — PREP FOR PROCEDURE (OUTPATIENT)
Dept: CARDIAC SURGERY | Facility: CLINIC | Age: 44
End: 2024-08-01
Payer: COMMERCIAL

## 2024-08-01 DIAGNOSIS — I25.118 CORONARY ARTERY DISEASE OF NATIVE ARTERY OF NATIVE HEART WITH STABLE ANGINA PECTORIS (CMS-HCC): Primary | ICD-10-CM

## 2024-08-02 DIAGNOSIS — Z01.818 PREOP TESTING: Primary | ICD-10-CM

## 2024-08-06 ENCOUNTER — HOSPITAL ENCOUNTER (OUTPATIENT)
Dept: VASCULAR MEDICINE | Facility: HOSPITAL | Age: 44
Discharge: HOME | End: 2024-08-06
Payer: COMMERCIAL

## 2024-08-06 ENCOUNTER — DOCUMENTATION (OUTPATIENT)
Dept: CARDIAC SURGERY | Facility: CLINIC | Age: 44
End: 2024-08-06

## 2024-08-06 ENCOUNTER — NURSE ONLY (OUTPATIENT)
Dept: CARDIOLOGY | Facility: HOSPITAL | Age: 44
End: 2024-08-06

## 2024-08-06 ENCOUNTER — PRE-ADMISSION TESTING (OUTPATIENT)
Dept: PREADMISSION TESTING | Facility: HOSPITAL | Age: 44
End: 2024-08-06
Payer: COMMERCIAL

## 2024-08-06 VITALS — WEIGHT: 211.64 LBS | HEIGHT: 69 IN | BODY MASS INDEX: 31.35 KG/M2

## 2024-08-06 DIAGNOSIS — I74.2 EMBOLISM AND THROMBOSIS OF ARTERIES OF THE UPPER EXTREMITIES (MULTI): ICD-10-CM

## 2024-08-06 DIAGNOSIS — Z01.818 PREOP TESTING: ICD-10-CM

## 2024-08-06 DIAGNOSIS — Z13.6 ENCOUNTER FOR SCREENING FOR CARDIOVASCULAR DISORDERS: ICD-10-CM

## 2024-08-06 DIAGNOSIS — I25.118 CORONARY ARTERY DISEASE OF NATIVE ARTERY OF NATIVE HEART WITH STABLE ANGINA PECTORIS (CMS-HCC): ICD-10-CM

## 2024-08-06 DIAGNOSIS — Z01.818 PRE-OP TESTING: Primary | ICD-10-CM

## 2024-08-06 LAB
ABO GROUP (TYPE) IN BLOOD: NORMAL
ABO GROUP (TYPE) IN BLOOD: NORMAL
ALBUMIN SERPL BCP-MCNC: 4.7 G/DL (ref 3.4–5)
ALP SERPL-CCNC: 60 U/L (ref 33–120)
ALT SERPL W P-5'-P-CCNC: 16 U/L (ref 10–52)
ANION GAP SERPL CALC-SCNC: 11 MMOL/L (ref 10–20)
ANTIBODY SCREEN: NORMAL
APPEARANCE UR: CLEAR
APTT PPP: 35 SECONDS (ref 27–38)
AST SERPL W P-5'-P-CCNC: 13 U/L (ref 9–39)
BILIRUB SERPL-MCNC: 0.8 MG/DL (ref 0–1.2)
BILIRUB UR STRIP.AUTO-MCNC: NEGATIVE MG/DL
BUN SERPL-MCNC: 14 MG/DL (ref 6–23)
CALCIUM SERPL-MCNC: 9.5 MG/DL (ref 8.6–10.3)
CHLORIDE SERPL-SCNC: 102 MMOL/L (ref 98–107)
CHOLEST SERPL-MCNC: 153 MG/DL (ref 0–199)
CHOLESTEROL/HDL RATIO: 3
CO2 SERPL-SCNC: 29 MMOL/L (ref 21–32)
COLOR UR: YELLOW
CREAT SERPL-MCNC: 0.88 MG/DL (ref 0.5–1.3)
EGFRCR SERPLBLD CKD-EPI 2021: >90 ML/MIN/1.73M*2
FIBRINOGEN PPP-MCNC: 349 MG/DL (ref 200–400)
GLUCOSE SERPL-MCNC: 288 MG/DL (ref 74–99)
GLUCOSE UR STRIP.AUTO-MCNC: ABNORMAL MG/DL
HDLC SERPL-MCNC: 51.3 MG/DL
HOLD SPECIMEN: NORMAL
INR PPP: 1 (ref 0.9–1.1)
KETONES UR STRIP.AUTO-MCNC: NEGATIVE MG/DL
LDLC SERPL CALC-MCNC: 84 MG/DL
LEUKOCYTE ESTERASE UR QL STRIP.AUTO: NEGATIVE
NITRITE UR QL STRIP.AUTO: NEGATIVE
NON HDL CHOLESTEROL: 102 MG/DL (ref 0–149)
PH UR STRIP.AUTO: 5 [PH]
POTASSIUM SERPL-SCNC: 4.1 MMOL/L (ref 3.5–5.3)
PROT SERPL-MCNC: 7.2 G/DL (ref 6.4–8.2)
PROT UR STRIP.AUTO-MCNC: NEGATIVE MG/DL
PROTHROMBIN TIME: 11.4 SECONDS (ref 9.8–12.8)
RBC # UR STRIP.AUTO: NEGATIVE /UL
RH FACTOR (ANTIGEN D): NORMAL
RH FACTOR (ANTIGEN D): NORMAL
SODIUM SERPL-SCNC: 138 MMOL/L (ref 136–145)
SP GR UR STRIP.AUTO: 1.04
TRIGL SERPL-MCNC: 88 MG/DL (ref 0–149)
TSH SERPL-ACNC: 0.8 MIU/L (ref 0.44–3.98)
UROBILINOGEN UR STRIP.AUTO-MCNC: NORMAL MG/DL
VLDL: 18 MG/DL (ref 0–40)

## 2024-08-06 PROCEDURE — 83718 ASSAY OF LIPOPROTEIN: CPT

## 2024-08-06 PROCEDURE — 36415 COLL VENOUS BLD VENIPUNCTURE: CPT

## 2024-08-06 PROCEDURE — 85384 FIBRINOGEN ACTIVITY: CPT

## 2024-08-06 PROCEDURE — 86901 BLOOD TYPING SEROLOGIC RH(D): CPT

## 2024-08-06 PROCEDURE — 93922 UPR/L XTREMITY ART 2 LEVELS: CPT

## 2024-08-06 PROCEDURE — 85610 PROTHROMBIN TIME: CPT

## 2024-08-06 PROCEDURE — 86920 COMPATIBILITY TEST SPIN: CPT

## 2024-08-06 PROCEDURE — 85730 THROMBOPLASTIN TIME PARTIAL: CPT

## 2024-08-06 PROCEDURE — 84443 ASSAY THYROID STIM HORMONE: CPT

## 2024-08-06 PROCEDURE — 87081 CULTURE SCREEN ONLY: CPT | Mod: PARLAB | Performed by: NURSE PRACTITIONER

## 2024-08-06 PROCEDURE — 84075 ASSAY ALKALINE PHOSPHATASE: CPT

## 2024-08-06 PROCEDURE — 81003 URINALYSIS AUTO W/O SCOPE: CPT

## 2024-08-06 PROCEDURE — 93930 UPPER EXTREMITY STUDY: CPT

## 2024-08-06 PROCEDURE — 99203 OFFICE O/P NEW LOW 30 MIN: CPT | Performed by: NURSE PRACTITIONER

## 2024-08-06 RX ORDER — CHLORHEXIDINE GLUCONATE 40 MG/ML
SOLUTION TOPICAL DAILY
Qty: 118 ML | Refills: 0 | Status: SHIPPED | OUTPATIENT
Start: 2024-08-06 | End: 2024-08-11

## 2024-08-06 RX ORDER — CHLORHEXIDINE GLUCONATE ORAL RINSE 1.2 MG/ML
15 SOLUTION DENTAL AS NEEDED
Qty: 120 ML | Refills: 0 | Status: SHIPPED | OUTPATIENT
Start: 2024-08-06 | End: 2024-08-27 | Stop reason: HOSPADM

## 2024-08-06 ASSESSMENT — DUKE ACTIVITY SCORE INDEX (DASI)
DASI METS SCORE: 7.4
CAN YOU RUN A SHORT DISTANCE: NO
CAN YOU DO MODERATE WORK AROUND THE HOUSE LIKE VACUUMING, SWEEPING FLOORS OR CARRYING GROCERIES: YES
CAN YOU PARTICIPATE IN MODERATE RECREATIONAL ACTIVITIES LIKE GOLF, BOWLING, DANCING, DOUBLES TENNIS OR THROWING A BASEBALL OR FOOTBALL: YES
CAN YOU PARTICIPATE IN STRENOUS SPORTS LIKE SWIMMING, SINGLES TENNIS, FOOTBALL, BASKETBALL, OR SKIING: YES
CAN YOU HAVE SEXUAL RELATIONS: YES
CAN YOU WALK A BLOCK OR TWO ON LEVEL GROUND: YES
CAN YOU DO YARD WORK LIKE RAKING LEAVES, WEEDING OR PUSHING A MOWER: NO
CAN YOU DO LIGHT WORK AROUND THE HOUSE LIKE DUSTING OR WASHING DISHES: YES
CAN YOU CLIMB A FLIGHT OF STAIRS OR WALK UP A HILL: YES
CAN YOU WALK INDOORS, SUCH AS AROUND YOUR HOUSE: YES
CAN YOU TAKE CARE OF YOURSELF (EAT, DRESS, BATHE, OR USE TOILET): YES
CAN YOU DO HEAVY WORK AROUND THE HOUSE LIKE SCRUBBING FLOORS OR LIFTING AND MOVING HEAVY FURNITURE: NO
TOTAL_SCORE: 37.7

## 2024-08-06 NOTE — PROGRESS NOTES
SUBJECT ID:  [id number]       Informed Consent Documentation -  Tool  Protocol Title: CLIP-  IRB # [id number]  : Bernabe Zhao MD    Participant ID:   [id number]     Visit Date:  August 6, 2024     Date of first contact with participant regarding study:   August 6, 2024     Informed Consent (ICF)/HIPAA Obtained by: (must be CITI certified)  Satnam Rodney RN   Date Signed:  August 6, 2024   Time Signed:  1356   Individuals present during the informed consent process:  Montana Diaz, Carey Kern     Did participant meet inclusion/exclusion criteria:  [x] YES  [] NO  [] PENDING     Date and Time Research activities began:   August 6, 2024 / 1600     Did the participant sign and date the ICF before any study procedures were performed  [x] YES  [] NO    Was the participant given adequate time to review the ICF and ask questions?  [x] YES  [] NO    Did the participant verbalize an understanding of the main purpose of study, (procedures, follow-up, risk etc.)  [x] YES  [] NO    Were the participants's questions answered to his/her satisfaction?  [x] YES  [] NO    Were other involved in the decision making?  [x] YES  If Yes, who?   Carey Kern   [] NO    Was the participant given a current, stamped copy of the ICF?  [x] YES  [] NO  If Yes, please list the version and expiration date below:  Version   ICF version 25 January 2024   Expiration Date  January 15, 2025     ADDITIONAL DOCUMENTATION     The participant was entered on the Enrollment Log.  [x] YES  [] NO    A copy of the consent form is filed in the  medical record.  [x] YES  [] NO    The current [facility] IRB consent template version is being used and appropriate signature blocks are present (I.e., LAR, next of kin , one parent, two parents, etc.)  [x] YES  [] NO    FORM COMPLETED BY: SATNAM RODNEY    DATE:   August 6, 2024

## 2024-08-06 NOTE — PREPROCEDURE INSTRUCTIONS
Medication List            Accurate as of August 6, 2024 12:04 PM. Always use your most recent med list.                aspirin 81 mg EC tablet  Medication Adjustments for Surgery: Take morning of surgery with sip of water, no other fluids     atorvastatin 80 mg tablet  Commonly known as: Lipitor  Take 1 tablet (80 mg) by mouth once daily.  Medication Adjustments for Surgery: Take morning of surgery with sip of water, no other fluids     * chlorhexidine 0.12 % solution  Commonly known as: Peridex  Use 15 mL in the mouth or throat if needed for wound care (oral rinse the night before surgery and the morning on the day of surgery) for up to 2 doses. Swish in mouth and spit out  Medication Adjustments for Surgery: Other (Comment)     * chlorhexidine 4 % external liquid  Commonly known as: Hibiclens  Apply topically once daily for 5 days. Begin using CHG for a total of 5 days before surgery Day 5 is the day of surgery See directions from the hospital  Medication Adjustments for Surgery: Other (Comment)     * gabapentin 300 mg capsule  Commonly known as: Neurontin  Take 1 capsule (300 mg) by mouth 3 times a day.     * gabapentin 100 mg capsule  Commonly known as: Neurontin  Take 1 capsule (100 mg) by mouth 3 times a day. If needed for anterior right ankle pain  Medication Adjustments for Surgery: Continue until night before surgery     glimepiride 4 mg tablet  Commonly known as: Amaryl  TAKE 1/2 TABLET BY MOUTH TWICE DAILY BEFORE A MEAL AND 1 TABLET BY MOUTH BEFORE DINNER  Medication Adjustments for Surgery: Stop 7 days before surgery  Notes to patient: Do not take the night before surgery or the day of surgery     lisinopril 40 mg tablet  Take 1 tablet (40 mg) by mouth once daily.  Medication Adjustments for Surgery: Other (Comment)  Notes to patient: Hold x 48 hours (2 days) before surgery     metFORMIN 1,000 mg tablet  Commonly known as: Glucophage  Take 1 tablet (1,000 mg) by mouth 2 times a day with  meals.  Medication Adjustments for Surgery: Other (Comment)  Notes to patient: Hold x 48 hours (2 days) before surgery     metoprolol succinate XL 50 mg 24 hr tablet  Commonly known as: Toprol-XL  Medication Adjustments for Surgery: Take morning of surgery with sip of water, no other fluids     nitroglycerin 0.4 mg SL tablet  Commonly known as: Nitrostat  Place 1 tablet (0.4 mg) under the tongue every 5 minutes if needed for chest pain. May repeat dose every 5 minutes for up to 3 doses total.  Medication Adjustments for Surgery: Other (Comment)  Notes to patient: Take as needed     Ozempic 1 mg/dose (4 mg/3 mL) pen injector  Generic drug: semaglutide  Inject 1 mg under the skin 1 (one) time per week. Wednesday  Medication Adjustments for Surgery: Stop 7 days before surgery  Notes to patient: DO not take after 8/12           * This list has 4 medication(s) that are the same as other medications prescribed for you. Read the directions carefully, and ask your doctor or other care provider to review them with you.                                  NPO Instructions:    Do not eat any food after midnight the night before your surgery/procedure.    Additional Instructions:     Day of Surgery:  You may have clear liquids until TWO hours before surgery/procedure.  This includes water, black tea/coffee, (no milk or cream) apple juice and electrolyte drinks (Gatorade)  Wear  comfortable loose fitting clothing  Do not use moisturizers, creams, lotions or perfume  All jewelry and valuables should be left at home    PRE-OPERATIVE INSTRUCTIONS FOR SURGERY    *Do not eat anything after midnight the night of surgery.  This includes food of any kind (including hard candy, cough drops, mints).   You may have up to 13.5 ounces of clear liquid  until TWO hours prior to your arrival time to the hospital unless ERAS* protocol is ordered for you.  Clear liquids include water, black tea/coffee, (no milk or cream) apple juice and electrolyte  drinks (GATORADE).  You may chew gum until TWO hours prior you your surgery/procedure.     *ERAS protocol: follow as instructed.  DO not drink an additional 13.5 ounces and noted above.        *One of our staff members will call you ONE business day before your surgery, between 11 am-2 pm to let you know the time to arrive.  If you have not received a call by 2 pm, call [phone]  *When you arrive at the hospital-->GO TO Registration on the ground floor  *Stop smoking 24 hours prior to surgery.  No Marijuana, CBD Oil or Vaping for 48 hours  *No alcohol 24 hours prior to surgery  *You will need a responsible adult to drive you home  -No acrylic nails or nail polish on at least one fingernail, NO polish on toes for foot surgery  -You may be asked to remove your dentures, partial plate, eyeglasses or contact lenses before going to surgery.  Please bring a case for these items.  -Body piercings need to be removed.  Jewelry and valuables should be left at home.  -Put on loose,  comfortable, clean clothing, that will accommodate bandages        What is a home antibacterial shower?  This shower is a way of cleaning the skin with a germ killing solution before surgery.  The solution contains chlorhexidine, commonly known as CHG.  CHG is a skin cleanser with germ killing ability.  Let your doctor know if you are allergic to chlorhexidine.    Why do I need to take a preoperative antibacterial shower?  Skin is not sterile.  It is best to try to make your skin as free of germs as possible before surgery.  Proper cleansing with a germ killing soap before surgery can lower the number of germs on your skin.  This helps to reduce the risk of infection at the surgical site.  Following the instructions listed below will help you prepare your skin for surgery.      How do I use the solution?    Steps: Begin using your CHG soap 5 days before your surgery on __________________.    *First, wash and rinse your hair using the CHG soap.   Keep CHG soap away from ear canals and eyes.   Rinse completely, do not condition.  Hair extensions should be removed.    *Wash your face with your normal soap and rinse.   *Apply the CHG solution to a clean wet washcloth.  Turn the water off or move away from the water spray to avoid premature rinsing of the CHG soap as you are applying.  Firmly lather your entire body from the neck down.  Do not use on your face.    *Pay special attention to the area(s) where your incision(s) will be located unless they are on your face.  Avoid scrubbing your skin too hard.  The important part is to have the CHG soap sit on your skin for 3 minutes.   *When the 3 minutes are up, turn on the water and rinse the CHG solution off your body completely.  *Do not wash with regular soap after you  have  used the CHG soap solution.  *Pat  yourself dry with a clean, freshly laundered towel.  *Do not apply powders, deodorants or lotions.  *Dress in clean freshly laundered night clothes.    *Be sure to sleep with clean freshly laundered sheets.    *Be aware the CHG will cause stains on fabrics; if you wash them with bleach after use.  Rinse your washcloth and other linens that have contact with CHG completely.  Use only non-chlorine detergents to launder the items  used.  *The morning of surgery is the fifth day.  Repeat the above steps and dress in clean comfortable clothing.     What is oral/dental rinse?  It is mouthwash.  It is a way of cleaning the he mouth with a germ-killing solution before your surgery.  The solution contains chlorhexidine, commonly known as CHG.  It is used inside the mouth to kill a bacteria known as Staphylococcus aureus.  Let your doctor know if you are allergic to Chlorhexidine.    Why do I need to use CHG oral/ dental rinse?  The CHG oral/dental rinse helps to kill bacteria in your mouth know as Staphylococcus aureus.  This reduces the risk of infection at the surgical site.    Using your CHG oral/dental  rinse    STEPS:    Use your CHG oral/dental rinse after you brush your teeth the night before (at bedtime) and the morning of your surgery.  Follow all the directions on your prescription label.  *Use the cap on the container to measure 15 ml  *Swish (gargle if you can) the mouthwash in your mouth for at least 30 seconds, (do not swallow) and spit out  *After you use your CHG rinse, do not rinse your mouth with water, drink or eat.  Please refer to the prescription label for the appropriate time to resume oral intake.    What side effects might I have using the CHG oral/dental rinse?  CHG rinse will stick you plaque on the teeth.  Brush and floss just before use.   Teeth brushing will help avoid staining of the plaque during  use.  Teeth brushing will help avoid staining of plaque during  use.    Who should I contact if I have questions about the CHG oral/dental rinse and or CHG soap?  Please call [facility], Pre-Admission testing at [phone] if you have any questions.    Thank you for allowing us to assist you with preparing for your procedure Please call us with any questions Oly REARDON

## 2024-08-06 NOTE — PROGRESS NOTES
Met with patient after PAT visit. CABG scheduled for 8/20/2024 with Dr. Zhao.   Heart Surgery education given to patient by Nurse Navigator with a good understanding.  Family present: wife was present.    Topics discussed:    1.  What to expect day of surgery and post-surgery  2.  CHG shower  3.  Intubation/Extubation  4. Chest tubes  5. Pain management   6.  Hospital medication  7. Daily weight education   8. Nutrition education   9.  Physical therapy   10.  IS teaching, (good effort noted)   11.  Haylee videos sent.   12. Eras Kit discussed: Diabetic instructions reviewed and given to patient.   13. Will take Lopressor and ASA day of surgery.       Patient verbalized understanding of open heart education, all questions answered.

## 2024-08-08 LAB — STAPHYLOCOCCUS SPEC CULT: NORMAL

## 2024-08-16 ENCOUNTER — TELEPHONE (OUTPATIENT)
Dept: CARDIAC SURGERY | Facility: CLINIC | Age: 44
End: 2024-08-16
Payer: COMMERCIAL

## 2024-08-16 NOTE — TELEPHONE ENCOUNTER
I spoke with Montana regarding his new surgery date of 8/23/24 with Dr. Zhao at 12 noon.  He asked that I call his wife Carey to review meds to hold and when. I called Carey and spoke with her regarding the new surgery date of 8/23/24 at noon. I let her know that he still needs to stop taking his Metformin and Lisinopril for two days prior to surgery so his last dose of both medications should be on 8/20/24 now. He should not take either medication on 8/21/24 or 8/22/24 or 8/23/24.  She stated that he takes his Ozempic on Wednesdays and I told her that he should not take his Ozempic next week on 8/21/24. That should be held prior to surgery. I reminded  her that pre admission testing will be calling the day prior to surgery to give them an arrival time. I reminded her that on the morning of surgery he is to take his Metoprolol and his Aspirin prior to coming to the hospital with just a sip of water. She acknowledged and understanding of everything.

## 2024-08-23 ENCOUNTER — APPOINTMENT (OUTPATIENT)
Dept: RADIOLOGY | Facility: HOSPITAL | Age: 44
DRG: 236 | End: 2024-08-23
Payer: COMMERCIAL

## 2024-08-23 ENCOUNTER — APPOINTMENT (OUTPATIENT)
Dept: CARDIOLOGY | Facility: HOSPITAL | Age: 44
DRG: 236 | End: 2024-08-23
Payer: COMMERCIAL

## 2024-08-23 ENCOUNTER — ANESTHESIA EVENT (OUTPATIENT)
Dept: OPERATING ROOM | Facility: HOSPITAL | Age: 44
End: 2024-08-23
Payer: COMMERCIAL

## 2024-08-23 ENCOUNTER — HOSPITAL ENCOUNTER (INPATIENT)
Facility: HOSPITAL | Age: 44
DRG: 236 | End: 2024-08-23
Attending: THORACIC SURGERY (CARDIOTHORACIC VASCULAR SURGERY) | Admitting: THORACIC SURGERY (CARDIOTHORACIC VASCULAR SURGERY)
Payer: COMMERCIAL

## 2024-08-23 ENCOUNTER — ANESTHESIA (OUTPATIENT)
Dept: OPERATING ROOM | Facility: HOSPITAL | Age: 44
End: 2024-08-23
Payer: COMMERCIAL

## 2024-08-23 DIAGNOSIS — Z01.818 PRE-OP TESTING: ICD-10-CM

## 2024-08-23 DIAGNOSIS — I25.10 CAD IN NATIVE ARTERY: Primary | ICD-10-CM

## 2024-08-23 DIAGNOSIS — I25.10 CORONARY ARTERY DISEASE INVOLVING NATIVE CORONARY ARTERY OF NATIVE HEART, UNSPECIFIED WHETHER ANGINA PRESENT: ICD-10-CM

## 2024-08-23 DIAGNOSIS — G89.18 ACUTE POST-OPERATIVE PAIN: ICD-10-CM

## 2024-08-23 DIAGNOSIS — Z95.1 S/P CABG X 3: ICD-10-CM

## 2024-08-23 DIAGNOSIS — E11.9 TYPE 2 DIABETES MELLITUS TREATED WITHOUT INSULIN (MULTI): ICD-10-CM

## 2024-08-23 DIAGNOSIS — E87.79 OTHER HYPERVOLEMIA: ICD-10-CM

## 2024-08-23 DIAGNOSIS — I25.118 CORONARY ARTERY DISEASE OF NATIVE ARTERY OF NATIVE HEART WITH STABLE ANGINA PECTORIS (CMS-HCC): ICD-10-CM

## 2024-08-23 LAB
ALBUMIN SERPL BCP-MCNC: 3.4 G/DL (ref 3.4–5)
ANION GAP BLDA CALCULATED.4IONS-SCNC: 10 MMO/L (ref 10–25)
ANION GAP BLDA CALCULATED.4IONS-SCNC: 11 MMO/L (ref 10–25)
ANION GAP BLDA CALCULATED.4IONS-SCNC: 12 MMO/L (ref 10–25)
ANION GAP BLDA CALCULATED.4IONS-SCNC: 4 MMO/L (ref 10–25)
ANION GAP BLDA CALCULATED.4IONS-SCNC: 6 MMO/L (ref 10–25)
ANION GAP BLDA CALCULATED.4IONS-SCNC: 6 MMO/L (ref 10–25)
ANION GAP BLDA CALCULATED.4IONS-SCNC: 7 MMO/L (ref 10–25)
ANION GAP BLDA CALCULATED.4IONS-SCNC: 9 MMO/L (ref 10–25)
ANION GAP BLDV CALCULATED.4IONS-SCNC: 10 MMOL/L (ref 10–25)
ANION GAP SERPL CALC-SCNC: 10 MMOL/L (ref 10–20)
APPARATUS: ABNORMAL
APPARATUS: ABNORMAL
APTT PPP: 30 SECONDS (ref 27–38)
BASE EXCESS BLDA CALC-SCNC: -0.4 MMOL/L (ref -2–3)
BASE EXCESS BLDA CALC-SCNC: -2.6 MMOL/L (ref -2–3)
BASE EXCESS BLDA CALC-SCNC: -2.8 MMOL/L (ref -2–3)
BASE EXCESS BLDA CALC-SCNC: 0.3 MMOL/L (ref -2–3)
BASE EXCESS BLDA CALC-SCNC: 0.3 MMOL/L (ref -2–3)
BASE EXCESS BLDA CALC-SCNC: 0.4 MMOL/L (ref -2–3)
BASE EXCESS BLDA CALC-SCNC: 0.7 MMOL/L (ref -2–3)
BASE EXCESS BLDA CALC-SCNC: 0.8 MMOL/L (ref -2–3)
BASE EXCESS BLDA CALC-SCNC: 1.3 MMOL/L (ref -2–3)
BASE EXCESS BLDA CALC-SCNC: 1.9 MMOL/L (ref -2–3)
BASE EXCESS BLDA CALC-SCNC: 3.4 MMOL/L (ref -2–3)
BASE EXCESS BLDV CALC-SCNC: 1.3 MMOL/L (ref -2–3)
BLOOD EXPIRATION DATE: NORMAL
BODY TEMPERATURE: 37 DEGREES CELSIUS
BUN SERPL-MCNC: 9 MG/DL (ref 6–23)
CA-I BLD-SCNC: 1.07 MMOL/L (ref 1.1–1.33)
CA-I BLDA-SCNC: 0.97 MMOL/L (ref 1.1–1.33)
CA-I BLDA-SCNC: 1.07 MMOL/L (ref 1.1–1.33)
CA-I BLDA-SCNC: 1.07 MMOL/L (ref 1.1–1.33)
CA-I BLDA-SCNC: 1.1 MMOL/L (ref 1.1–1.33)
CA-I BLDA-SCNC: 1.11 MMOL/L (ref 1.1–1.33)
CA-I BLDA-SCNC: 1.11 MMOL/L (ref 1.1–1.33)
CA-I BLDA-SCNC: 1.12 MMOL/L (ref 1.1–1.33)
CA-I BLDA-SCNC: 1.14 MMOL/L (ref 1.1–1.33)
CA-I BLDA-SCNC: 1.2 MMOL/L (ref 1.1–1.33)
CA-I BLDV-SCNC: 1.14 MMOL/L (ref 1.1–1.33)
CALCIUM SERPL-MCNC: 7.8 MG/DL (ref 8.6–10.3)
CHLORIDE BLDA-SCNC: 104 MMOL/L (ref 98–107)
CHLORIDE BLDA-SCNC: 104 MMOL/L (ref 98–107)
CHLORIDE BLDA-SCNC: 105 MMOL/L (ref 98–107)
CHLORIDE BLDA-SCNC: 106 MMOL/L (ref 98–107)
CHLORIDE BLDA-SCNC: 106 MMOL/L (ref 98–107)
CHLORIDE BLDA-SCNC: 108 MMOL/L (ref 98–107)
CHLORIDE BLDA-SCNC: 109 MMOL/L (ref 98–107)
CHLORIDE BLDV-SCNC: 103 MMOL/L (ref 98–107)
CHLORIDE SERPL-SCNC: 109 MMOL/L (ref 98–107)
CO2 SERPL-SCNC: 24 MMOL/L (ref 21–32)
COHGB MFR BLDA: 0.6 %
COHGB MFR BLDA: 0.7 %
COHGB MFR BLDA: 0.9 %
COHGB MFR BLDA: 1 %
COHGB MFR BLDA: 1 %
COHGB MFR BLDA: 1.1 %
COHGB MFR BLDA: 1.1 %
COHGB MFR BLDA: 1.2 %
COHGB MFR BLDV: 1.4 %
CREAT SERPL-MCNC: 0.6 MG/DL (ref 0.5–1.3)
DISPENSE STATUS: NORMAL
DO-HGB MFR BLDA: 0.3 % (ref 0–5)
DO-HGB MFR BLDA: 0.3 % (ref 0–5)
DO-HGB MFR BLDA: 0.4 % (ref 0–5)
DO-HGB MFR BLDA: 0.5 % (ref 0–5)
DO-HGB MFR BLDA: 0.9 % (ref 0–5)
DO-HGB MFR BLDA: 0.9 % (ref 0–5)
DO-HGB MFR BLDA: 1 % (ref 0–5)
DO-HGB MFR BLDA: 1.1 % (ref 0–5)
EGFRCR SERPLBLD CKD-EPI 2021: >90 ML/MIN/1.73M*2
ERYTHROCYTE [DISTWIDTH] IN BLOOD BY AUTOMATED COUNT: 12.1 % (ref 11.5–14.5)
FIBRINOGEN PPP-MCNC: 189 MG/DL (ref 200–400)
FREQUENCY (BPM): 18 BPM
FREQUENCY (BPM): 22 BPM
GLUCOSE BLD MANUAL STRIP-MCNC: 155 MG/DL (ref 74–99)
GLUCOSE BLD MANUAL STRIP-MCNC: 196 MG/DL (ref 74–99)
GLUCOSE BLDA-MCNC: 117 MG/DL (ref 74–99)
GLUCOSE BLDA-MCNC: 121 MG/DL (ref 74–99)
GLUCOSE BLDA-MCNC: 122 MG/DL (ref 74–99)
GLUCOSE BLDA-MCNC: 133 MG/DL (ref 74–99)
GLUCOSE BLDA-MCNC: 145 MG/DL (ref 74–99)
GLUCOSE BLDA-MCNC: 148 MG/DL (ref 74–99)
GLUCOSE BLDA-MCNC: 149 MG/DL (ref 74–99)
GLUCOSE BLDA-MCNC: 155 MG/DL (ref 74–99)
GLUCOSE BLDA-MCNC: 165 MG/DL (ref 74–99)
GLUCOSE BLDA-MCNC: 192 MG/DL (ref 74–99)
GLUCOSE BLDA-MCNC: 196 MG/DL (ref 74–99)
GLUCOSE BLDV-MCNC: 124 MG/DL (ref 74–99)
GLUCOSE SERPL-MCNC: 130 MG/DL (ref 74–99)
HCO3 BLDA-SCNC: 22.6 MMOL/L (ref 22–26)
HCO3 BLDA-SCNC: 23.2 MMOL/L (ref 22–26)
HCO3 BLDA-SCNC: 24.8 MMOL/L (ref 22–26)
HCO3 BLDA-SCNC: 25.4 MMOL/L (ref 22–26)
HCO3 BLDA-SCNC: 25.4 MMOL/L (ref 22–26)
HCO3 BLDA-SCNC: 25.9 MMOL/L (ref 22–26)
HCO3 BLDA-SCNC: 26 MMOL/L (ref 22–26)
HCO3 BLDA-SCNC: 26.6 MMOL/L (ref 22–26)
HCO3 BLDA-SCNC: 28.5 MMOL/L (ref 22–26)
HCO3 BLDV-SCNC: 27.9 MMOL/L (ref 22–26)
HCT VFR BLD AUTO: 32.9 % (ref 41–52)
HCT VFR BLD EST: 30 % (ref 41–52)
HCT VFR BLD EST: 31 % (ref 41–52)
HCT VFR BLD EST: 34 % (ref 41–52)
HCT VFR BLD EST: 35 % (ref 41–52)
HCT VFR BLD EST: 36 % (ref 41–52)
HCT VFR BLD EST: 37 % (ref 41–52)
HGB BLD-MCNC: 10.8 G/DL (ref 13.5–17.5)
HGB BLDA-MCNC: 10.1 G/DL (ref 13.5–17.5)
HGB BLDA-MCNC: 10.2 G/DL (ref 13.5–17.5)
HGB BLDA-MCNC: 10.3 G/DL (ref 13.5–17.5)
HGB BLDA-MCNC: 10.3 G/DL (ref 13.5–17.5)
HGB BLDA-MCNC: 11.4 G/DL (ref 13.5–17.5)
HGB BLDA-MCNC: 11.6 G/DL (ref 13.5–17.5)
HGB BLDA-MCNC: 11.6 G/DL (ref 13.5–17.5)
HGB BLDA-MCNC: 12 G/DL (ref 13.5–17.5)
HGB BLDA-MCNC: 12 G/DL (ref 13.5–17.5)
HGB BLDA-MCNC: 12.3 G/DL (ref 13.5–17.5)
HGB BLDA-MCNC: 12.3 G/DL (ref 13.5–17.5)
HGB BLDA-MCNC: 9.9 G/DL (ref 13.5–17.5)
HGB BLDA-MCNC: 9.9 G/DL (ref 13.5–17.5)
HGB BLDV-MCNC: 10.2 G/DL (ref 13.5–17.5)
HOLD SPECIMEN: NORMAL
HOLD SPECIMEN: NORMAL
INHALED O2 CONCENTRATION: 100 %
INHALED O2 CONCENTRATION: 40 %
INHALED O2 CONCENTRATION: 50 %
INHALED O2 CONCENTRATION: 70 %
INHALED O2 CONCENTRATION: 70 %
INHALED O2 CONCENTRATION: 75 %
INHALED O2 CONCENTRATION: 85 %
INR PPP: 1.4 (ref 0.9–1.1)
LACTATE BLDA-SCNC: 0.8 MMOL/L (ref 0.4–2)
LACTATE BLDA-SCNC: 1.1 MMOL/L (ref 0.4–2)
LACTATE BLDA-SCNC: 1.4 MMOL/L (ref 0.4–2)
LACTATE BLDA-SCNC: 1.5 MMOL/L (ref 0.4–2)
LACTATE BLDA-SCNC: 1.5 MMOL/L (ref 0.4–2)
LACTATE BLDA-SCNC: 1.6 MMOL/L (ref 0.4–2)
LACTATE BLDA-SCNC: 1.6 MMOL/L (ref 0.4–2)
LACTATE BLDA-SCNC: 1.7 MMOL/L (ref 0.4–2)
LACTATE BLDA-SCNC: 1.8 MMOL/L (ref 0.4–2)
LACTATE BLDA-SCNC: 1.9 MMOL/L (ref 0.4–2)
LACTATE BLDA-SCNC: 2 MMOL/L (ref 0.4–2)
LACTATE BLDV-SCNC: 1.9 MMOL/L (ref 0.4–2)
MAGNESIUM SERPL-MCNC: 1.95 MG/DL (ref 1.6–2.4)
MAGNESIUM SERPL-MCNC: 2.25 MG/DL (ref 1.6–2.4)
MCH RBC QN AUTO: 27.2 PG (ref 26–34)
MCHC RBC AUTO-ENTMCNC: 32.8 G/DL (ref 32–36)
MCV RBC AUTO: 83 FL (ref 80–100)
METHGB MFR BLDA: 0.6 % (ref 0–1.5)
METHGB MFR BLDA: 0.8 % (ref 0–1.5)
METHGB MFR BLDA: 0.8 % (ref 0–1.5)
METHGB MFR BLDA: 0.9 % (ref 0–1.5)
METHGB MFR BLDA: 1 % (ref 0–1.5)
METHGB MFR BLDA: 1 % (ref 0–1.5)
METHGB MFR BLDA: 1.2 % (ref 0–1.5)
METHGB MFR BLDA: 1.3 % (ref 0–1.5)
METHGB MFR BLDV: 1.1 % (ref 0–1.5)
NRBC BLD-RTO: 0 /100 WBCS (ref 0–0)
OXYHGB MFR BLDA: 96.6 % (ref 94–98)
OXYHGB MFR BLDA: 96.7 % (ref 94–98)
OXYHGB MFR BLDA: 97.1 % (ref 94–98)
OXYHGB MFR BLDA: 97.1 % (ref 94–98)
OXYHGB MFR BLDA: 97.2 % (ref 94–98)
OXYHGB MFR BLDA: 97.3 % (ref 94–98)
OXYHGB MFR BLDA: 97.7 % (ref 94–98)
OXYHGB MFR BLDA: 97.9 % (ref 94–98)
OXYHGB MFR BLDA: 97.9 % (ref 94–98)
OXYHGB MFR BLDV: 79.1 % (ref 45–75)
PCO2 BLDA: 40 MM HG (ref 38–42)
PCO2 BLDA: 40 MM HG (ref 38–42)
PCO2 BLDA: 41 MM HG (ref 38–42)
PCO2 BLDA: 41 MM HG (ref 38–42)
PCO2 BLDA: 42 MM HG (ref 38–42)
PCO2 BLDA: 42 MM HG (ref 38–42)
PCO2 BLDA: 43 MM HG (ref 38–42)
PCO2 BLDA: 43 MM HG (ref 38–42)
PCO2 BLDA: 45 MM HG (ref 38–42)
PCO2 BLDA: 45 MM HG (ref 38–42)
PCO2 BLDA: 46 MM HG (ref 38–42)
PCO2 BLDV: 53 MM HG (ref 41–51)
PEEP CMH2O: 5 CM H2O
PEEP CMH2O: 5 CM H2O
PH BLDA: 7.34 PH (ref 7.38–7.42)
PH BLDA: 7.35 PH (ref 7.38–7.42)
PH BLDA: 7.36 PH (ref 7.38–7.42)
PH BLDA: 7.37 PH (ref 7.38–7.42)
PH BLDA: 7.38 PH (ref 7.38–7.42)
PH BLDA: 7.39 PH (ref 7.38–7.42)
PH BLDA: 7.39 PH (ref 7.38–7.42)
PH BLDA: 7.41 PH (ref 7.38–7.42)
PH BLDA: 7.41 PH (ref 7.38–7.42)
PH BLDA: 7.42 PH (ref 7.38–7.42)
PH BLDA: 7.42 PH (ref 7.38–7.42)
PH BLDV: 7.33 PH (ref 7.33–7.43)
PHOSPHATE SERPL-MCNC: 4.5 MG/DL (ref 2.5–4.9)
PLATELET # BLD AUTO: 133 X10*3/UL (ref 150–450)
PLATELET # BLD AUTO: 142 X10*3/UL (ref 150–450)
PO2 BLDA: 118 MM HG (ref 85–95)
PO2 BLDA: 118 MM HG (ref 85–95)
PO2 BLDA: 157 MM HG (ref 85–95)
PO2 BLDA: 254 MM HG (ref 85–95)
PO2 BLDA: 259 MM HG (ref 85–95)
PO2 BLDA: 282 MM HG (ref 85–95)
PO2 BLDA: 309 MM HG (ref 85–95)
PO2 BLDA: 321 MM HG (ref 85–95)
PO2 BLDA: 363 MM HG (ref 85–95)
PO2 BLDA: 403 MM HG (ref 85–95)
PO2 BLDA: 415 MM HG (ref 85–95)
PO2 BLDV: 50 MM HG (ref 35–45)
POTASSIUM BLDA-SCNC: 3.5 MMOL/L (ref 3.5–5.3)
POTASSIUM BLDA-SCNC: 3.7 MMOL/L (ref 3.5–5.3)
POTASSIUM BLDA-SCNC: 3.7 MMOL/L (ref 3.5–5.3)
POTASSIUM BLDA-SCNC: 3.8 MMOL/L (ref 3.5–5.3)
POTASSIUM BLDA-SCNC: 4.1 MMOL/L (ref 3.5–5.3)
POTASSIUM BLDA-SCNC: 4.2 MMOL/L (ref 3.5–5.3)
POTASSIUM BLDA-SCNC: 4.3 MMOL/L (ref 3.5–5.3)
POTASSIUM BLDA-SCNC: 4.4 MMOL/L (ref 3.5–5.3)
POTASSIUM BLDA-SCNC: 4.7 MMOL/L (ref 3.5–5.3)
POTASSIUM BLDA-SCNC: 4.8 MMOL/L (ref 3.5–5.3)
POTASSIUM BLDA-SCNC: 5.1 MMOL/L (ref 3.5–5.3)
POTASSIUM BLDV-SCNC: 4.6 MMOL/L (ref 3.5–5.3)
POTASSIUM SERPL-SCNC: 4 MMOL/L (ref 3.5–5.3)
PRESSURE SUPPORT: 8 CM H2O
PRODUCT BLOOD TYPE: 5100
PRODUCT CODE: NORMAL
PROTHROMBIN TIME: 15.3 SECONDS (ref 9.8–12.8)
RBC # BLD AUTO: 3.97 X10*6/UL (ref 4.5–5.9)
SAO2 % BLDA: 100 % (ref 94–100)
SAO2 % BLDA: 99 % (ref 94–100)
SAO2 % BLDV: 81 % (ref 45–75)
SODIUM BLDA-SCNC: 132 MMOL/L (ref 136–145)
SODIUM BLDA-SCNC: 133 MMOL/L (ref 136–145)
SODIUM BLDA-SCNC: 134 MMOL/L (ref 136–145)
SODIUM BLDA-SCNC: 135 MMOL/L (ref 136–145)
SODIUM BLDA-SCNC: 136 MMOL/L (ref 136–145)
SODIUM BLDA-SCNC: 139 MMOL/L (ref 136–145)
SODIUM BLDV-SCNC: 136 MMOL/L (ref 136–145)
SODIUM SERPL-SCNC: 139 MMOL/L (ref 136–145)
SPECIMEN DRAWN FROM PATIENT: ABNORMAL
SPECIMEN DRAWN FROM PATIENT: ABNORMAL
SPONTANEOUS TIDAL VOLUME: 509 ML
SPONTANEOUS TIDAL VOLUME: 659 ML
TIDAL VOLUME: 500 ML
TOTAL MINUTE VOLUME: 11 LITER
TOTAL MINUTE VOLUME: 11.4 LITER
UNIT ABO: NORMAL
UNIT NUMBER: NORMAL
UNIT RH: NORMAL
UNIT VOLUME: 350
VENTILATOR MODE: ABNORMAL
VENTILATOR MODE: ABNORMAL
VENTILATOR RATE: 16 BPM
WBC # BLD AUTO: 8.4 X10*3/UL (ref 4.4–11.3)
XM INTEP: NORMAL

## 2024-08-23 PROCEDURE — 74018 RADEX ABDOMEN 1 VIEW: CPT

## 2024-08-23 PROCEDURE — 82435 ASSAY OF BLOOD CHLORIDE: CPT

## 2024-08-23 PROCEDURE — 85049 AUTOMATED PLATELET COUNT: CPT | Performed by: THORACIC SURGERY (CARDIOTHORACIC VASCULAR SURGERY)

## 2024-08-23 PROCEDURE — 85384 FIBRINOGEN ACTIVITY: CPT | Performed by: THORACIC SURGERY (CARDIOTHORACIC VASCULAR SURGERY)

## 2024-08-23 PROCEDURE — 2720000007 HC OR 272 NO HCPCS: Performed by: THORACIC SURGERY (CARDIOTHORACIC VASCULAR SURGERY)

## 2024-08-23 PROCEDURE — 82810 BLOOD GASES O2 SAT ONLY: CPT

## 2024-08-23 PROCEDURE — 33517 CABG ARTERY-VEIN SINGLE: CPT | Performed by: THORACIC SURGERY (CARDIOTHORACIC VASCULAR SURGERY)

## 2024-08-23 PROCEDURE — 87081 CULTURE SCREEN ONLY: CPT | Mod: PARLAB | Performed by: NURSE PRACTITIONER

## 2024-08-23 PROCEDURE — 5A1221Z PERFORMANCE OF CARDIAC OUTPUT, CONTINUOUS: ICD-10-PCS | Performed by: THORACIC SURGERY (CARDIOTHORACIC VASCULAR SURGERY)

## 2024-08-23 PROCEDURE — 03BC4ZZ EXCISION OF LEFT RADIAL ARTERY, PERCUTANEOUS ENDOSCOPIC APPROACH: ICD-10-PCS | Performed by: THORACIC SURGERY (CARDIOTHORACIC VASCULAR SURGERY)

## 2024-08-23 PROCEDURE — 93005 ELECTROCARDIOGRAM TRACING: CPT

## 2024-08-23 PROCEDURE — 2020000001 HC ICU ROOM DAILY

## 2024-08-23 PROCEDURE — 85018 HEMOGLOBIN: CPT

## 2024-08-23 PROCEDURE — 3600000012 HC PERFUSION TIME - EACH INCREMENTAL 1 MINUTE: Performed by: THORACIC SURGERY (CARDIOTHORACIC VASCULAR SURGERY)

## 2024-08-23 PROCEDURE — 2500000005 HC RX 250 GENERAL PHARMACY W/O HCPCS: Performed by: THORACIC SURGERY (CARDIOTHORACIC VASCULAR SURGERY)

## 2024-08-23 PROCEDURE — 33534 CABG ARTERIAL TWO: CPT | Performed by: THORACIC SURGERY (CARDIOTHORACIC VASCULAR SURGERY)

## 2024-08-23 PROCEDURE — 37799 UNLISTED PX VASCULAR SURGERY: CPT | Performed by: THORACIC SURGERY (CARDIOTHORACIC VASCULAR SURGERY)

## 2024-08-23 PROCEDURE — 82330 ASSAY OF CALCIUM: CPT

## 2024-08-23 PROCEDURE — 33508 ENDOSCOPIC VEIN HARVEST: CPT | Performed by: THORACIC SURGERY (CARDIOTHORACIC VASCULAR SURGERY)

## 2024-08-23 PROCEDURE — 2500000004 HC RX 250 GENERAL PHARMACY W/ HCPCS (ALT 636 FOR OP/ED): Mod: JZ | Performed by: THORACIC SURGERY (CARDIOTHORACIC VASCULAR SURGERY)

## 2024-08-23 PROCEDURE — 93010 ELECTROCARDIOGRAM REPORT: CPT | Performed by: INTERNAL MEDICINE

## 2024-08-23 PROCEDURE — 3700000001 HC GENERAL ANESTHESIA TIME - INITIAL BASE CHARGE: Performed by: THORACIC SURGERY (CARDIOTHORACIC VASCULAR SURGERY)

## 2024-08-23 PROCEDURE — S0017 INJECTION, AMINOCAPROIC ACID: HCPCS | Performed by: THORACIC SURGERY (CARDIOTHORACIC VASCULAR SURGERY)

## 2024-08-23 PROCEDURE — 33509 NDSC HRV UXTR ART 1 SGM CAB: CPT | Performed by: THORACIC SURGERY (CARDIOTHORACIC VASCULAR SURGERY)

## 2024-08-23 PROCEDURE — 2500000004 HC RX 250 GENERAL PHARMACY W/ HCPCS (ALT 636 FOR OP/ED): Performed by: NURSE ANESTHETIST, CERTIFIED REGISTERED

## 2024-08-23 PROCEDURE — 021109W BYPASS CORONARY ARTERY, TWO ARTERIES FROM AORTA WITH AUTOLOGOUS VENOUS TISSUE, OPEN APPROACH: ICD-10-PCS | Performed by: THORACIC SURGERY (CARDIOTHORACIC VASCULAR SURGERY)

## 2024-08-23 PROCEDURE — A4649 SURGICAL SUPPLIES: HCPCS | Performed by: THORACIC SURGERY (CARDIOTHORACIC VASCULAR SURGERY)

## 2024-08-23 PROCEDURE — P9045 ALBUMIN (HUMAN), 5%, 250 ML: HCPCS | Mod: JZ | Performed by: THORACIC SURGERY (CARDIOTHORACIC VASCULAR SURGERY)

## 2024-08-23 PROCEDURE — 2500000004 HC RX 250 GENERAL PHARMACY W/ HCPCS (ALT 636 FOR OP/ED): Performed by: THORACIC SURGERY (CARDIOTHORACIC VASCULAR SURGERY)

## 2024-08-23 PROCEDURE — C1751 CATH, INF, PER/CENT/MIDLINE: HCPCS | Performed by: THORACIC SURGERY (CARDIOTHORACIC VASCULAR SURGERY)

## 2024-08-23 PROCEDURE — 02L70ZK OCCLUSION OF LEFT ATRIAL APPENDAGE, OPEN APPROACH: ICD-10-PCS | Performed by: THORACIC SURGERY (CARDIOTHORACIC VASCULAR SURGERY)

## 2024-08-23 PROCEDURE — A4312 CATH W/O BAG 2-WAY SILICONE: HCPCS | Performed by: THORACIC SURGERY (CARDIOTHORACIC VASCULAR SURGERY)

## 2024-08-23 PROCEDURE — 99291 CRITICAL CARE FIRST HOUR: CPT

## 2024-08-23 PROCEDURE — 85610 PROTHROMBIN TIME: CPT | Performed by: THORACIC SURGERY (CARDIOTHORACIC VASCULAR SURGERY)

## 2024-08-23 PROCEDURE — 3600000011 HC PERFUSION TIME - INITIAL BASE CHARGE: Performed by: THORACIC SURGERY (CARDIOTHORACIC VASCULAR SURGERY)

## 2024-08-23 PROCEDURE — 85730 THROMBOPLASTIN TIME PARTIAL: CPT | Performed by: THORACIC SURGERY (CARDIOTHORACIC VASCULAR SURGERY)

## 2024-08-23 PROCEDURE — 94002 VENT MGMT INPAT INIT DAY: CPT

## 2024-08-23 PROCEDURE — 74018 RADEX ABDOMEN 1 VIEW: CPT | Performed by: RADIOLOGY

## 2024-08-23 PROCEDURE — 2500000002 HC RX 250 W HCPCS SELF ADMINISTERED DRUGS (ALT 637 FOR MEDICARE OP, ALT 636 FOR OP/ED)

## 2024-08-23 PROCEDURE — 2500000002 HC RX 250 W HCPCS SELF ADMINISTERED DRUGS (ALT 637 FOR MEDICARE OP, ALT 636 FOR OP/ED): Performed by: NURSE ANESTHETIST, CERTIFIED REGISTERED

## 2024-08-23 PROCEDURE — 82375 ASSAY CARBOXYHB QUANT: CPT

## 2024-08-23 PROCEDURE — 06BP4ZZ EXCISION OF RIGHT SAPHENOUS VEIN, PERCUTANEOUS ENDOSCOPIC APPROACH: ICD-10-PCS | Performed by: THORACIC SURGERY (CARDIOTHORACIC VASCULAR SURGERY)

## 2024-08-23 PROCEDURE — 3600000018 HC OR TIME - INITIAL BASE CHARGE - PROCEDURE LEVEL SIX: Performed by: THORACIC SURGERY (CARDIOTHORACIC VASCULAR SURGERY)

## 2024-08-23 PROCEDURE — 02100Z9 BYPASS CORONARY ARTERY, ONE ARTERY FROM LEFT INTERNAL MAMMARY, OPEN APPROACH: ICD-10-PCS | Performed by: THORACIC SURGERY (CARDIOTHORACIC VASCULAR SURGERY)

## 2024-08-23 PROCEDURE — 2780000003 HC OR 278 NO HCPCS: Performed by: THORACIC SURGERY (CARDIOTHORACIC VASCULAR SURGERY)

## 2024-08-23 PROCEDURE — 2500000004 HC RX 250 GENERAL PHARMACY W/ HCPCS (ALT 636 FOR OP/ED)

## 2024-08-23 PROCEDURE — 2500000001 HC RX 250 WO HCPCS SELF ADMINISTERED DRUGS (ALT 637 FOR MEDICARE OP)

## 2024-08-23 PROCEDURE — 99222 1ST HOSP IP/OBS MODERATE 55: CPT | Performed by: STUDENT IN AN ORGANIZED HEALTH CARE EDUCATION/TRAINING PROGRAM

## 2024-08-23 PROCEDURE — 3600000017 HC OR TIME - EACH INCREMENTAL 1 MINUTE - PROCEDURE LEVEL SIX: Performed by: THORACIC SURGERY (CARDIOTHORACIC VASCULAR SURGERY)

## 2024-08-23 PROCEDURE — 82435 ASSAY OF BLOOD CHLORIDE: CPT | Performed by: STUDENT IN AN ORGANIZED HEALTH CARE EDUCATION/TRAINING PROGRAM

## 2024-08-23 PROCEDURE — 71045 X-RAY EXAM CHEST 1 VIEW: CPT | Performed by: RADIOLOGY

## 2024-08-23 PROCEDURE — 37799 UNLISTED PX VASCULAR SURGERY: CPT

## 2024-08-23 PROCEDURE — 82947 ASSAY GLUCOSE BLOOD QUANT: CPT

## 2024-08-23 PROCEDURE — 83735 ASSAY OF MAGNESIUM: CPT | Performed by: THORACIC SURGERY (CARDIOTHORACIC VASCULAR SURGERY)

## 2024-08-23 PROCEDURE — 83735 ASSAY OF MAGNESIUM: CPT

## 2024-08-23 PROCEDURE — 85027 COMPLETE CBC AUTOMATED: CPT

## 2024-08-23 PROCEDURE — 2500000005 HC RX 250 GENERAL PHARMACY W/O HCPCS

## 2024-08-23 PROCEDURE — 2500000005 HC RX 250 GENERAL PHARMACY W/O HCPCS: Performed by: NURSE ANESTHETIST, CERTIFIED REGISTERED

## 2024-08-23 PROCEDURE — 3700000002 HC GENERAL ANESTHESIA TIME - EACH INCREMENTAL 1 MINUTE: Performed by: THORACIC SURGERY (CARDIOTHORACIC VASCULAR SURGERY)

## 2024-08-23 PROCEDURE — 71045 X-RAY EXAM CHEST 1 VIEW: CPT

## 2024-08-23 DEVICE — CLIP PENDITURE LAAC35
Type: IMPLANTABLE DEVICE | Site: HEART | Status: FUNCTIONAL
Brand: PENDITURE™

## 2024-08-23 RX ORDER — GABAPENTIN 100 MG/1
100 CAPSULE ORAL 3 TIMES DAILY
Status: DISCONTINUED | OUTPATIENT
Start: 2024-08-23 | End: 2024-08-27 | Stop reason: HOSPADM

## 2024-08-23 RX ORDER — CALCIUM GLUCONATE 20 MG/ML
2 INJECTION, SOLUTION INTRAVENOUS EVERY 6 HOURS PRN
Status: DISCONTINUED | OUTPATIENT
Start: 2024-08-23 | End: 2024-08-27 | Stop reason: HOSPADM

## 2024-08-23 RX ORDER — PHENYLEPHRINE HCL IN 0.9% NACL 1 MG/10 ML
SYRINGE (ML) INTRAVENOUS AS NEEDED
Status: DISCONTINUED | OUTPATIENT
Start: 2024-08-23 | End: 2024-08-23

## 2024-08-23 RX ORDER — POLYETHYLENE GLYCOL 3350 17 G/17G
17 POWDER, FOR SOLUTION ORAL DAILY
Status: DISCONTINUED | OUTPATIENT
Start: 2024-08-23 | End: 2024-08-27 | Stop reason: HOSPADM

## 2024-08-23 RX ORDER — ALBUMIN HUMAN 50 G/1000ML
12.5 SOLUTION INTRAVENOUS
Status: COMPLETED | OUTPATIENT
Start: 2024-08-23 | End: 2024-08-23

## 2024-08-23 RX ORDER — POTASSIUM CHLORIDE 29.8 MG/ML
40 INJECTION INTRAVENOUS EVERY 6 HOURS PRN
Status: DISCONTINUED | OUTPATIENT
Start: 2024-08-23 | End: 2024-08-27 | Stop reason: HOSPADM

## 2024-08-23 RX ORDER — LIDOCAINE HCL/PF 100 MG/5ML
SYRINGE (ML) INTRAVENOUS AS NEEDED
Status: DISCONTINUED | OUTPATIENT
Start: 2024-08-23 | End: 2024-08-23

## 2024-08-23 RX ORDER — MIDAZOLAM HYDROCHLORIDE 1 MG/ML
INJECTION, SOLUTION INTRAMUSCULAR; INTRAVENOUS AS NEEDED
Status: DISCONTINUED | OUTPATIENT
Start: 2024-08-23 | End: 2024-08-23

## 2024-08-23 RX ORDER — NALOXONE HYDROCHLORIDE 1 MG/ML
0.2 INJECTION INTRAMUSCULAR; INTRAVENOUS; SUBCUTANEOUS EVERY 5 MIN PRN
Status: DISCONTINUED | OUTPATIENT
Start: 2024-08-23 | End: 2024-08-27 | Stop reason: HOSPADM

## 2024-08-23 RX ORDER — PROPOFOL 10 MG/ML
INJECTION, EMULSION INTRAVENOUS
Status: DISCONTINUED
Start: 2024-08-23 | End: 2024-08-24 | Stop reason: WASHOUT

## 2024-08-23 RX ORDER — SODIUM CHLORIDE, SODIUM LACTATE, POTASSIUM CHLORIDE, CALCIUM CHLORIDE 600; 310; 30; 20 MG/100ML; MG/100ML; MG/100ML; MG/100ML
30 INJECTION, SOLUTION INTRAVENOUS CONTINUOUS
Status: DISCONTINUED | OUTPATIENT
Start: 2024-08-23 | End: 2024-08-24

## 2024-08-23 RX ORDER — ONDANSETRON HYDROCHLORIDE 2 MG/ML
4 INJECTION, SOLUTION INTRAVENOUS EVERY 8 HOURS PRN
Status: DISCONTINUED | OUTPATIENT
Start: 2024-08-23 | End: 2024-08-27 | Stop reason: HOSPADM

## 2024-08-23 RX ORDER — ONDANSETRON 4 MG/1
4 TABLET, ORALLY DISINTEGRATING ORAL EVERY 8 HOURS PRN
Status: DISCONTINUED | OUTPATIENT
Start: 2024-08-23 | End: 2024-08-27 | Stop reason: HOSPADM

## 2024-08-23 RX ORDER — NOREPINEPHRINE BITARTRATE 0.03 MG/ML
0-3 INJECTION, SOLUTION INTRAVENOUS CONTINUOUS
Status: DISCONTINUED | OUTPATIENT
Start: 2024-08-23 | End: 2024-08-23

## 2024-08-23 RX ORDER — DEXMEDETOMIDINE HYDROCHLORIDE 4 UG/ML
0-1.5 INJECTION, SOLUTION INTRAVENOUS CONTINUOUS
Status: DISCONTINUED | OUTPATIENT
Start: 2024-08-23 | End: 2024-08-23

## 2024-08-23 RX ORDER — LIDOCAINE HYDROCHLORIDE 10 MG/ML
INJECTION INFILTRATION; PERINEURAL AS NEEDED
Status: DISCONTINUED | OUTPATIENT
Start: 2024-08-23 | End: 2024-08-23

## 2024-08-23 RX ORDER — CEFAZOLIN SODIUM 2 G/100ML
INJECTION, SOLUTION INTRAVENOUS
Status: COMPLETED
Start: 2024-08-23 | End: 2024-08-23

## 2024-08-23 RX ORDER — CEFAZOLIN SODIUM 2 G/100ML
INJECTION, SOLUTION INTRAVENOUS AS NEEDED
Status: DISCONTINUED | OUTPATIENT
Start: 2024-08-23 | End: 2024-08-23

## 2024-08-23 RX ORDER — SODIUM CHLORIDE 9 MG/ML
INJECTION, SOLUTION INTRAVENOUS CONTINUOUS PRN
Status: DISCONTINUED | OUTPATIENT
Start: 2024-08-23 | End: 2024-08-23

## 2024-08-23 RX ORDER — MAGNESIUM SULFATE HEPTAHYDRATE 40 MG/ML
2 INJECTION, SOLUTION INTRAVENOUS EVERY 6 HOURS PRN
Status: DISCONTINUED | OUTPATIENT
Start: 2024-08-23 | End: 2024-08-27 | Stop reason: HOSPADM

## 2024-08-23 RX ORDER — MAGNESIUM SULFATE HEPTAHYDRATE 40 MG/ML
4 INJECTION, SOLUTION INTRAVENOUS EVERY 6 HOURS PRN
Status: DISCONTINUED | OUTPATIENT
Start: 2024-08-23 | End: 2024-08-27 | Stop reason: HOSPADM

## 2024-08-23 RX ORDER — NITROGLYCERIN 20 MG/100ML
5-200 INJECTION INTRAVENOUS CONTINUOUS
Status: DISCONTINUED | OUTPATIENT
Start: 2024-08-23 | End: 2024-08-25

## 2024-08-23 RX ORDER — MULTIVIT-MIN/IRON FUM/FOLIC AC 7.5 MG-4
1 TABLET ORAL DAILY
Status: DISCONTINUED | OUTPATIENT
Start: 2024-08-23 | End: 2024-08-27 | Stop reason: HOSPADM

## 2024-08-23 RX ORDER — ETOMIDATE 2 MG/ML
INJECTION INTRAVENOUS AS NEEDED
Status: DISCONTINUED | OUTPATIENT
Start: 2024-08-23 | End: 2024-08-23

## 2024-08-23 RX ORDER — FENTANYL CITRATE 50 UG/ML
INJECTION, SOLUTION INTRAMUSCULAR; INTRAVENOUS
Status: COMPLETED
Start: 2024-08-23 | End: 2024-08-23

## 2024-08-23 RX ORDER — LANOLIN ALCOHOL/MO/W.PET/CERES
100 CREAM (GRAM) TOPICAL DAILY
Status: DISCONTINUED | OUTPATIENT
Start: 2024-08-26 | End: 2024-08-27 | Stop reason: HOSPADM

## 2024-08-23 RX ORDER — NAPROXEN SODIUM 220 MG/1
81 TABLET, FILM COATED ORAL DAILY
Status: DISCONTINUED | OUTPATIENT
Start: 2024-08-23 | End: 2024-08-27 | Stop reason: HOSPADM

## 2024-08-23 RX ORDER — ATORVASTATIN CALCIUM 80 MG/1
80 TABLET, FILM COATED ORAL NIGHTLY
Status: DISCONTINUED | OUTPATIENT
Start: 2024-08-24 | End: 2024-08-27 | Stop reason: HOSPADM

## 2024-08-23 RX ORDER — OXYCODONE HYDROCHLORIDE 5 MG/1
5 TABLET ORAL EVERY 4 HOURS PRN
Status: DISCONTINUED | OUTPATIENT
Start: 2024-08-23 | End: 2024-08-23

## 2024-08-23 RX ORDER — HYDROMORPHONE HYDROCHLORIDE 0.2 MG/ML
0.2 INJECTION INTRAMUSCULAR; INTRAVENOUS; SUBCUTANEOUS
Status: DISCONTINUED | OUTPATIENT
Start: 2024-08-23 | End: 2024-08-27 | Stop reason: HOSPADM

## 2024-08-23 RX ORDER — PANTOPRAZOLE SODIUM 40 MG/1
40 TABLET, DELAYED RELEASE ORAL
Status: DISCONTINUED | OUTPATIENT
Start: 2024-08-24 | End: 2024-08-27 | Stop reason: HOSPADM

## 2024-08-23 RX ORDER — INSULIN LISPRO 100 [IU]/ML
0-15 INJECTION, SOLUTION INTRAVENOUS; SUBCUTANEOUS EVERY 4 HOURS
Status: DISCONTINUED | OUTPATIENT
Start: 2024-08-23 | End: 2024-08-27

## 2024-08-23 RX ORDER — DEXMEDETOMIDINE HYDROCHLORIDE 4 UG/ML
INJECTION, SOLUTION INTRAVENOUS CONTINUOUS PRN
Status: DISCONTINUED | OUTPATIENT
Start: 2024-08-23 | End: 2024-08-23

## 2024-08-23 RX ORDER — HEPARIN SODIUM 1000 [USP'U]/ML
INJECTION, SOLUTION INTRAVENOUS; SUBCUTANEOUS AS NEEDED
Status: DISCONTINUED | OUTPATIENT
Start: 2024-08-23 | End: 2024-08-23

## 2024-08-23 RX ORDER — CALCIUM GLUCONATE 20 MG/ML
1 INJECTION, SOLUTION INTRAVENOUS EVERY 6 HOURS PRN
Status: DISCONTINUED | OUTPATIENT
Start: 2024-08-23 | End: 2024-08-27 | Stop reason: HOSPADM

## 2024-08-23 RX ORDER — FOLIC ACID 1 MG/1
1 TABLET ORAL DAILY
Status: DISCONTINUED | OUTPATIENT
Start: 2024-08-23 | End: 2024-08-27 | Stop reason: HOSPADM

## 2024-08-23 RX ORDER — THIAMINE HYDROCHLORIDE 100 MG/ML
100 INJECTION, SOLUTION INTRAMUSCULAR; INTRAVENOUS DAILY
Status: DISCONTINUED | OUTPATIENT
Start: 2024-08-23 | End: 2024-08-24

## 2024-08-23 RX ORDER — PROTAMINE SULFATE 10 MG/ML
INJECTION, SOLUTION INTRAVENOUS AS NEEDED
Status: DISCONTINUED | OUTPATIENT
Start: 2024-08-23 | End: 2024-08-23

## 2024-08-23 RX ORDER — NITROGLYCERIN 20 MG/100ML
INJECTION INTRAVENOUS CONTINUOUS PRN
Status: DISCONTINUED | OUTPATIENT
Start: 2024-08-23 | End: 2024-08-23

## 2024-08-23 RX ORDER — SUCCINYLCHOLINE/SOD CL,ISO/PF 100 MG/5ML
SYRINGE (ML) INTRAVENOUS AS NEEDED
Status: DISCONTINUED | OUTPATIENT
Start: 2024-08-23 | End: 2024-08-23

## 2024-08-23 RX ORDER — POTASSIUM CHLORIDE 14.9 MG/ML
20 INJECTION INTRAVENOUS EVERY 6 HOURS PRN
Status: DISCONTINUED | OUTPATIENT
Start: 2024-08-23 | End: 2024-08-27 | Stop reason: HOSPADM

## 2024-08-23 RX ORDER — PAPAVERINE HYDROCHLORIDE 30 MG/ML
INJECTION INTRAMUSCULAR; INTRAVENOUS AS NEEDED
Status: DISCONTINUED | OUTPATIENT
Start: 2024-08-23 | End: 2024-08-23 | Stop reason: HOSPADM

## 2024-08-23 RX ORDER — SODIUM CHLORIDE, SODIUM LACTATE, POTASSIUM CHLORIDE, CALCIUM CHLORIDE 600; 310; 30; 20 MG/100ML; MG/100ML; MG/100ML; MG/100ML
5 INJECTION, SOLUTION INTRAVENOUS CONTINUOUS
Status: DISCONTINUED | OUTPATIENT
Start: 2024-08-23 | End: 2024-08-27 | Stop reason: HOSPADM

## 2024-08-23 RX ORDER — OXYCODONE HYDROCHLORIDE 5 MG/1
5 TABLET ORAL EVERY 4 HOURS PRN
Status: DISCONTINUED | OUTPATIENT
Start: 2024-08-23 | End: 2024-08-27 | Stop reason: HOSPADM

## 2024-08-23 RX ORDER — CEFAZOLIN SODIUM 2 G/100ML
2 INJECTION, SOLUTION INTRAVENOUS EVERY 8 HOURS
Status: COMPLETED | OUTPATIENT
Start: 2024-08-24 | End: 2024-08-25

## 2024-08-23 RX ORDER — PANTOPRAZOLE SODIUM 40 MG/10ML
40 INJECTION, POWDER, LYOPHILIZED, FOR SOLUTION INTRAVENOUS
Status: DISCONTINUED | OUTPATIENT
Start: 2024-08-24 | End: 2024-08-24

## 2024-08-23 RX ORDER — SODIUM CHLORIDE, SODIUM LACTATE, POTASSIUM CHLORIDE, CALCIUM CHLORIDE 600; 310; 30; 20 MG/100ML; MG/100ML; MG/100ML; MG/100ML
INJECTION, SOLUTION INTRAVENOUS CONTINUOUS PRN
Status: DISCONTINUED | OUTPATIENT
Start: 2024-08-23 | End: 2024-08-23

## 2024-08-23 RX ORDER — DEXTROSE 50 % IN WATER (D50W) INTRAVENOUS SYRINGE
25
Status: DISCONTINUED | OUTPATIENT
Start: 2024-08-23 | End: 2024-08-27 | Stop reason: HOSPADM

## 2024-08-23 RX ORDER — FENTANYL CITRATE 50 UG/ML
INJECTION, SOLUTION INTRAMUSCULAR; INTRAVENOUS AS NEEDED
Status: DISCONTINUED | OUTPATIENT
Start: 2024-08-23 | End: 2024-08-23

## 2024-08-23 RX ORDER — CALCIUM CHLORIDE INJECTION 100 MG/ML
INJECTION, SOLUTION INTRAVENOUS AS NEEDED
Status: DISCONTINUED | OUTPATIENT
Start: 2024-08-23 | End: 2024-08-23

## 2024-08-23 RX ORDER — OXYCODONE HYDROCHLORIDE 5 MG/1
10 TABLET ORAL EVERY 4 HOURS PRN
Status: DISCONTINUED | OUTPATIENT
Start: 2024-08-23 | End: 2024-08-27 | Stop reason: HOSPADM

## 2024-08-23 RX ORDER — FENTANYL CITRATE 50 UG/ML
50 INJECTION, SOLUTION INTRAMUSCULAR; INTRAVENOUS ONCE
Status: COMPLETED | OUTPATIENT
Start: 2024-08-23 | End: 2024-08-23

## 2024-08-23 RX ORDER — HEPARIN SODIUM 10000 [USP'U]/ML
INJECTION, SOLUTION INTRAVENOUS; SUBCUTANEOUS AS NEEDED
Status: DISCONTINUED | OUTPATIENT
Start: 2024-08-23 | End: 2024-08-23

## 2024-08-23 RX ORDER — AMOXICILLIN 250 MG
2 CAPSULE ORAL 2 TIMES DAILY
Status: DISCONTINUED | OUTPATIENT
Start: 2024-08-23 | End: 2024-08-27 | Stop reason: HOSPADM

## 2024-08-23 RX ORDER — VANCOMYCIN HYDROCHLORIDE 1 G/20ML
INJECTION, POWDER, LYOPHILIZED, FOR SOLUTION INTRAVENOUS AS NEEDED
Status: DISCONTINUED | OUTPATIENT
Start: 2024-08-23 | End: 2024-08-23 | Stop reason: HOSPADM

## 2024-08-23 RX ORDER — SODIUM CHLORIDE 0.9 G/100ML
IRRIGANT IRRIGATION AS NEEDED
Status: DISCONTINUED | OUTPATIENT
Start: 2024-08-23 | End: 2024-08-23 | Stop reason: HOSPADM

## 2024-08-23 RX ORDER — NITROGLYCERIN 40 MG/100ML
INJECTION INTRAVENOUS AS NEEDED
Status: DISCONTINUED | OUTPATIENT
Start: 2024-08-23 | End: 2024-08-23

## 2024-08-23 RX ORDER — NORETHINDRONE AND ETHINYL ESTRADIOL 0.5-0.035
KIT ORAL CONTINUOUS PRN
Status: DISCONTINUED | OUTPATIENT
Start: 2024-08-23 | End: 2024-08-23

## 2024-08-23 RX ORDER — ACETAMINOPHEN 325 MG/1
975 TABLET ORAL EVERY 8 HOURS
Status: DISCONTINUED | OUTPATIENT
Start: 2024-08-23 | End: 2024-08-27 | Stop reason: HOSPADM

## 2024-08-23 RX ORDER — ROCURONIUM BROMIDE 10 MG/ML
INJECTION, SOLUTION INTRAVENOUS AS NEEDED
Status: DISCONTINUED | OUTPATIENT
Start: 2024-08-23 | End: 2024-08-23

## 2024-08-23 SDOH — HEALTH STABILITY: MENTAL HEALTH: CURRENT SMOKER: 0

## 2024-08-23 ASSESSMENT — COLUMBIA-SUICIDE SEVERITY RATING SCALE - C-SSRS
6. HAVE YOU EVER DONE ANYTHING, STARTED TO DO ANYTHING, OR PREPARED TO DO ANYTHING TO END YOUR LIFE?: NO
1. IN THE PAST MONTH, HAVE YOU WISHED YOU WERE DEAD OR WISHED YOU COULD GO TO SLEEP AND NOT WAKE UP?: NO
2. HAVE YOU ACTUALLY HAD ANY THOUGHTS OF KILLING YOURSELF?: NO

## 2024-08-23 ASSESSMENT — PAIN SCALES - GENERAL
PAIN_LEVEL: 0
PAINLEVEL_OUTOF10: 0 - NO PAIN
PAINLEVEL_OUTOF10: 0 - NO PAIN

## 2024-08-23 ASSESSMENT — PAIN - FUNCTIONAL ASSESSMENT
PAIN_FUNCTIONAL_ASSESSMENT: 0-10
PAIN_FUNCTIONAL_ASSESSMENT: CPOT (CRITICAL CARE PAIN OBSERVATION TOOL)

## 2024-08-23 NOTE — ANESTHESIA PREPROCEDURE EVALUATION
Patient: Montana Kern    Procedure Information       Date/Time: 08/23/24 1200    Procedure: CORONARY ARTERY BYPASS GRAFT x3/ LEFT INTERNAL MAMMARY ARTERY/ RADIAL & VEIN - Surgery start time of approximately 1200 (To follow [facility] case on this day).  Surgery length of 5 hours.    CABG X3 (LIMA, Radial, Vein)    Location: [facility] / [facility]    Surgeons: Bernabe Zhao MD            Relevant Problems   Cardiac   (+) Angina pectoris, unstable (Multi)   (+) Angina, class III (CMS-HCC)   (+) Coronary artery disease involving native coronary artery of native heart   (+) Essential hypertension, benign   (+) Exertional chest pain      Endocrine   (+) Type 2 diabetes mellitus treated without insulin (Multi)      HEENT   (+) Acute non-recurrent maxillary sinusitis       Clinical information reviewed:    Allergies  Meds               NPO Detail:  NPO/Void Status  Carbohydrate Drink Given Prior to Surgery? : Y  Date of Last Liquid: 08/23/24  Time of Last Liquid: 0830  Date of Last Solid: 08/22/24  Time of Last Solid: 2200  Last Intake Type: Clear fluids  Time of Last Void: 1000         Physical Exam    Airway  Mallampati: III  TM distance: >3 FB  Neck ROM: full     Cardiovascular - normal exam     Dental        Pulmonary - normal exam     Abdominal - normal exam             Anesthesia Plan    History of general anesthesia?: yes  History of complications of general anesthesia?: no    ASA 4     general     The patient is not a current smoker.  Patient was previously instructed to abstain from smoking on day of procedure.  Patient did not smoke on day of procedure.    intravenous induction   Postoperative administration of opioids is intended.  Anesthetic plan and risks discussed with patient.  Use of blood products discussed with patient who consented to blood products.    Plan discussed with CRNA.

## 2024-08-23 NOTE — ANESTHESIA PROCEDURE NOTES
Central Venous Line:    Date/Time: 8/23/2024 12:42 PM    A central venous line was placed in the OR for the following indication(s): central venous access and CVP monitoring.  Staffing  Performed: attending   Authorized by: Cassius Cabral MD    Performed by: JONH Ivey-CRNA    Sterility preparation included the following: provider hand hygiene performed prior to central venous catheter insertion, all 5 sterile barriers used (gloves, gown, cap, mask, large sterile drape) during central venous catheter insertion, antiseptic used during central venous catheter insertion and skin prep agent completely dried prior to procedure.  The patient was placed in Trendelenburg position.    Right internal jugular vein was prepped.    The site was prepped with Chlorhexidine.  Size: 7 Fr   Length: 15  Catheter type: introducer   Number of Lumens: triple lumen    This catheter was not an oximetric catheter.    During the procedure, the following specific steps were taken: target vein identified, needle advanced into vein and blood aspirated and guidewire advanced into vein.Procedure performed using ultrasound guidance and surface landmarks.  Sterile gel and probe cover used in ultrasound-guided central venous catheter insertion.    Intravenous verification was obtained by ultrasound, venous blood return and transducer.      Post insertion care included: all ports aspirated, all ports flushed easily, guidewire removed intact, Biopatch applied, line sutured in place and dressing applied.    During the procedure the patient experienced: patient tolerated procedure well with no complications.

## 2024-08-23 NOTE — INTERVAL H&P NOTE
H&P reviewed. The patient was examined and there are no changes to the H&P.    Plan for CABG x 3, as previously discussed.

## 2024-08-23 NOTE — H&P
CVICU History & Physical    Subjective   HPI:  Montana Kern is a 44 year old male with PMH HTN, HLD, and NIDDM.  Now s/p CABGx3 (LIMA-LAD, SVG-PDA, Radial-OM), LAAL, Ligation Ligament of zahra, and pericardial window with Dr. Zhao on 8/23.    Cardiac Testing:     TTE: 07/26/2024  CONCLUSIONS:   1. Left ventricular ejection fraction is normal, by visual estimate at 55-60%.   2. The apex is hypokinetic particularly the septal aspect. Inferior aspect of the apex slightly hypokinetic as well.   3. There is normal right ventricular global systolic function.   4. The left atrium is mild to moderately dilated.   5. Mild aortic root dilatation at 4 cm mild ascending aorta dilatation at 4.1 cm.   6. Normal valve function.    [facility]: 07/26/2024  CONCLUSIONS:   1. Left Main Coronary Artery: This artery is normal.   2. Left Anterior Descending Artery: is significantly obstructed.   3. Ostial LAD Lesion: The percent stenosis is 90%.   4. Mid LAD Lesion: The percent stenosis is 100%.   5. Circumflex Coronary Artery: significantly obstructed.   6. Ostial CX Lesion: The percent stenosis is 90%.   7. Right Coronary Artery: significantly obstructed.   8. Proximal RCA Lesion: The percent stenosis is 75%.   9. Distal RCA Lesion: The percent stenosis is 90%.  10. Evaluate for coronary artery bypass surgery.  11. Posterior Lateral Branch RCA Lesion: The percent stenosis is 90%.  12. The Left Ventricular Ejection Fraction is 50%.     Procedure/Surgeon: Dr. Zhao  Frontliner/Anesthesia: Dr. Cabral  Out of OR Time (document on ventilator card): 1954     OR Course/Issues: Uncomplicated     CPB time: 163 min   Cross clamp time: 114 min  Circ arrest time: N/A  Echo Pre/Post: BiV  Chest Tubes/Drains: Bilateral pleural and med chest tubes   Temporary wires location/setting: V wires      Fluids  Crystalloid: 3.5 L  Colloid: 300 ml  Cellsaver: 623 ml  Products: N/A  EBL: 250 ml  UOP: 898 ml     Anesthesia  Intubation: 7.5 @ 22; easy  intubation   Intravenous Access: RIJ CVC   AICD: N/A  PPM: N/A  Regional anesthesia: N/A  Benzodiazepine dose/last administration: 10 mg midazolam total  Opioid dose/last administration: 350 mcg fentanyl total  NMB dose/last administration: 200 mg rocuronium total  TOF/ reversal given: To be given in unit  Antibiotic time: Ancef 2G @ 1313  Temperature on admission to ICU: 36.3C    Past Medical History:   Diagnosis Date    Acute bronchitis due to other specified organisms 11/26/2020    Acute bronchitis due to other specified organisms    Asthma ([facility])     Diabetes (Multi)     type 2    Hyperlipidemia     Hypertension     Kidney stones     passed on own    Osteoarthritis     ankles    Personal history of other specified conditions 11/26/2020    History of persistent cough     Past Surgical History:   Procedure Laterality Date    CARDIAC CATHETERIZATION N/A 7/26/2024    Procedure: Left Heart Cath;  Surgeon: Rome Guevara MD;  Location: [facility];  Service: Cardiovascular;  Laterality: N/A;  on Aspirin and Metoprolol, hold diabetic meds morning of    HERNIA REPAIR      x 3    MULTIPLE TOOTH EXTRACTIONS      VASECTOMY       Medications Prior to Admission   Medication Sig Dispense Refill Last Dose    aspirin 81 mg EC tablet Take 1 tablet (81 mg) by mouth once daily.   8/23/2024    atorvastatin (Lipitor) 80 mg tablet Take 1 tablet (80 mg) by mouth once daily. 30 tablet 1 Past Week    chlorhexidine (Peridex) 0.12 % solution Use 15 mL in the mouth or throat if needed for wound care (oral rinse the night before surgery and the morning on the day of surgery) for up to 2 doses. Swish in mouth and spit out 120 mL 0 8/23/2024    gabapentin (Neurontin) 300 mg capsule Take 1 capsule (300 mg) by mouth 3 times a day. (Patient taking differently: Take 1 capsule (300 mg) by mouth 2 times a day.) 270 capsule 3 8/22/2024    glimepiride (Amaryl) 4 mg tablet TAKE 1/2 TABLET BY MOUTH TWICE DAILY BEFORE A MEAL AND 1 TABLET BY  MOUTH BEFORE DINNER 180 tablet 3 8/22/2024    lisinopril 40 mg tablet Take 1 tablet (40 mg) by mouth once daily. 90 tablet 3 Past Week    metFORMIN (Glucophage) 1,000 mg tablet Take 1 tablet (1,000 mg) by mouth 2 times a day with meals. 180 tablet 3 Past Week    metoprolol succinate XL (Toprol-XL) 50 mg 24 hr tablet Take 1 tablet (50 mg) by mouth once daily. Do not crush or chew.   8/23/2024 at 0830    semaglutide (Ozempic) 1 mg/dose (4 mg/3 mL) pen injector Inject 1 mg under the skin 1 (one) time per week. Wednesday 9 mL 2 Past Week    gabapentin (Neurontin) 100 mg capsule Take 1 capsule (100 mg) by mouth 3 times a day. If needed for anterior right ankle pain 90 capsule 5     nitroglycerin (Nitrostat) 0.4 mg SL tablet Place 1 tablet (0.4 mg) under the tongue every 5 minutes if needed for chest pain. May repeat dose every 5 minutes for up to 3 doses total. (Patient not taking: Reported on 8/23/2024) 25 tablet 5 Not Taking     Patient has no known allergies.  Social History     Tobacco Use    Smoking status: Never    Smokeless tobacco: Never   Vaping Use    Vaping status: Never Used   Substance Use Topics    Alcohol use: Yes     Comment: occasionally    Drug use: Never     Family History   Problem Relation Name Age of Onset    Heart disease Mother      Stroke Father      Heart attack Father         Review of Systems:  Intubated and sedated.    Objective   Vitals:  Most Recent:  Vitals:    08/23/24 1059   BP: (!) 153/91   Pulse: 61   Resp: 16   Temp: 36.1 °C (97 °F)   SpO2: 98%       24hr Min/Max:  Temp  Min: 36.1 °C (97 °F)  Max: 36.1 °C (97 °F)  Pulse  Min: 61  Max: 61  BP  Min: 153/91  Max: 153/91  Resp  Min: 16  Max: 16  SpO2  Min: 98 %  Max: 98 %    I/O:  No intake/output data recorded.    LDA:  CVC 08/23/24 Triple lumen Right Internal jugular (Active)   Placement Date/Time: 08/23/24 (c) 5122   Hand Hygiene Performed Prior to CVC Insertion: Yes  Site Prep: Chlorhexidine   Site Prep Agent has Completely Dried  "Before Insertion: Yes  All 5 Sterile Barriers Used (Gloves, Gown, Cap, Mask, Large Sterile Jossy...   Number of days: 0       Arterial Line 08/23/24 Right Radial (Active)   Placement Date/Time: 08/23/24 (c) 1216   Size: 20 G  Orientation: Right  Location: Radial  Securement Method: Taped  Patient Tolerance: Tolerated well   Number of days: 0       ETT  7.5 mm (Active)   Placement Date/Time: 08/23/24 (c) 1237   Mask Ventilation: Vent by mask  Technique: (c) Video laryngoscopy  ETT Type: ETT - single  Single Lumen Tube Size: 7.5 mm  Cuffed: Yes  Laryngoscope: Simeon  Blade Size: 4  Location: Oral  Grade View: Full v...   Number of days: 0       Urethral Catheter Temperature probe;Straight-tip 16 Fr. (Active)   Placement Date/Time: 08/23/24 1240   Placed by: IMELDA KESSLER  Hand Hygiene Completed: Yes  Catheter Type: Temperature probe;Straight-tip  Tube Size (Fr.): 16 Fr.  Catheter Balloon Size: 10 mL  Urine Returned: Yes   Number of days: 0       Physical Exam:   PHYSICAL EXAM:  - GENERAL: Sedated. No acute distress. Well-nourished.  - NEUROLOGIC: No focal neurological deficits. RASS - 4; Sedated.   - LUNGS: Diminished bases. No accessory muscle use. Intubated 50%/PEEP5. Bilateral pleural and med, no air leak  - CARDIOVASCULAR: Regular rate and rhythm.  Epicardial wires VVI@60; SR  - ABDOMEN: Soft, non-tender and non-distended. No palpable masses.  - : Clear, yellow urine in quinteros.   - EXTREMITIES: No edema. Non-tender.?  - SKIN: No rashes or lesions. Warm.  - PSYCHIATRIC: Calm, unable to assess sedated.       Lab Review:            No lab exists for component: \"LABALBU\"      Results from last 7 days   Lab Units 08/23/24  1440   POCT PH, ARTERIAL pH 7.39   POCT PCO2, ARTERIAL mm Hg 42   POCT PO2, ARTERIAL mm Hg 363*   POCT HCO3 CALCULATED, ARTERIAL mmol/L 25.4   POCT BASE EXCESS, ARTERIAL mmol/L 0.3       Most recent labs and imaging reviewed.    Daily Risk Screen  Intubated: SBT  Central line: Hemodynamics  Quinteros: " I/Os    Assessment/Plan     Assessment:  Montana Kern is a 44 year old male with PMH HTN, HLD, and NIDDM.  Now s/p CABGx3 (LIMA-LAD, SVG-PDA, Radial-OM), LAAL, Ligation Ligament of zahra, and pericardial window with Dr. Zhao on 8/23.     Plan:  NEURO:  PMH of OA (Ankles) and concern for ETOH use. Patient is intubated and sedated on propofol infusion. Acute post operative pain.   -->  - Serial neuro and pain assessments   - Titrate Precedex for RASS 0 to -2; stop once extubated  - NMB reversal when normothermic and hemodynamically stable.    - Resume home Gabapentin 100 mg TID and Magnesium 400 mg Daily  - Scheduled Tylenol 975 mg q 8hr  - PRN oxycodone  - PRN dilaudid for pain   - PT Consult, OOB to chair as tolerated, chair position if not tolerated   - CAM ICU score qshift  - Sleep/wake cycle hygiene  - confirm ETOH use once extubated  - CIWA scores -> Phenobarbital if CIWA scores elevated     CV:  Patient has a history of HTN, HLD, and CAD.  Now s/p CABGx3 (LIMA-LAD, SVG-PDA, Radial-OM), LAAL, Ligation Ligament of zahra, and pericardial window with Dr. Zhao on 8/23.  Pre/Post EF: Norm BiV.  Arrived to CTICU on nitro for radial graft.  V epicardial wires set VVI@60; SR 80s.-->  - Maintain goal MAP 70-90  - Mixed venous and CI Q4H  - Volume resuscitate as clinically indicated  - Maintain epicardial wires set VVI @ 60  - Start aspirin 81 mg within 6 hours  - Will start Plavix after chest tubes removed  - Start statin tomorrow  - Hold home Lisinopril 40 mg, metoprolol XL 50 mg daily     PULM:  No history of pulmonary disease.  Currently intubated on ventilator, oxygenating and ventilating appropriately. Chest tubes bilateral pleural and med, no air leak. -->  - F/u post op CXR  - Once reversed, wean ventilator settings towards CPAP & extubation   - Wean FiO2 maintaining SpO2 >92%.   - IS q1h and OOB to chair when extubated  - Chest tubes to wall suction.    GI:  No history. OG in place. -->  - Continue  PPI until extubated  - NPO, will perform bedside swallow eval post extubation   - Colace/senna BID and miralax BID    :  CSA-NEETU Risk Score Low.  No history of renal disease, baseline creatinine 0.9. Creatinine stable post-op. Quinteros in place and making adequate UOP. -->  - Continue quinteros catheter for strict I/Os.  - Goal UOP 0.5ml/kg/hr  - RFP as clinically indicated  - Replete electrolytes per CTICU protocol     ENDO:  PMH of NIDDM.  A1c: 6.9. -->  - Maintain BG <180, insulin per CTICU protocol  - Hold home glimepiride, metformin, and ozempic     HEME:  Acute blood loss anemia and thrombocytopenia.-->    - Monitor drain output volume and characteristics  - CBC, coags, and fibrinogen post op and as clinically indicated  - Start aspirin 6hrs post-op for CABG  - If CABG is 2/2 STEMI/unstable angina, will receive Plavix POD2  - Start SQH tomorrow   - SCDs for DVT prophylaxis.  - Last type and screen: 8/23     ID:  Afebrile, no current indications of infection. MRSA: Negative. -->  - Trend temp q4h  - Periop cefazolin x 48hrs     Skin:  No active skin issues.  - preventative Mepilex dressings in place on sacrum and heels  - change preventative Mepilex weekly or more frequently as indicated (when moist/soiled)   - every shift skin assessment per nursing and weekly ICU skin rounds  - moisture barrier to be applied with chris care  - active skin problems addressed with nursing on daily rounds     Proph:  SCDs  PPI     G:  Line  Right IJ CVC placed 8/23  Right radial a-line placed 8/23  Quinteros 8/23    F: Family: will update at bedside postoperatively.     A,B,C,D,E,F,G: reviewed    I personally spent 60 minutes of critical care time directly and personally managing the patient exclusive of separately billable procedures.     Dispo: CVICU care for now.

## 2024-08-23 NOTE — OP NOTE
CORONARY ARTERY BYPASS GRAFT x3/ LEFT INTERNAL MAMMARY ARTERY/ RADIAL & VEIN/ LEFT ATRIAL APPENDAGE LIGATION Operative Note     Date: 2024  OR Location: [facility]    Name: Montana Kern YOB: 1980, Age: 44 y.o., MRN: [id number], Sex: male    Diagnosis  Pre-op Diagnosis      * Coronary artery disease involving native coronary artery of native heart, unspecified whether angina present [I25.10] Post-op Diagnosis     * Coronary artery disease involving native coronary artery of native heart, unspecified whether angina present [I25.10]     Procedures  CORONARY ARTERY BYPASS GRAFT x3/ LEFT INTERNAL MAMMARY ARTERY/ RADIAL & VEIN/ LEFT ATRIAL APPENDAGE LIGATION  75030 - [place] CABG W/ARTERIAL GRAFT TWO ARTERIAL GRAFTS  1. Coronary artery bypass grafting x 3:    In situ left internal mammary artery to left anterior descending coronary artery bypass.    Aorto to right posterior descending saphenous vein coronary artery bypass.       Aorto to first marginal circumflex radial artery coronary artery bypass.  2. Left atrial appendage ligation with Medtronic Penditure 35 mm clip (LOT: [id number]).   3. Ligation, Ligament of Loco.  4. Left posterior pericardial window creation.  5. Cannulation and initiation of cardiopulmonary oxygenator.   6. Medistim flow probe analysis of bypass grafts.   7. Endoscopic harvest of left radial artery.   8. Endoscopic harvest of right saphenous vein.    Surgeons      * Bernabe Zhao - Primary    Resident/Fellow/Other Assistant:  Vinicio Barr SA;  Lazarus Roca SA    Procedure Summary  Anesthesia: General  ASA: IV  Anesthesia Staff: Anesthesiologist: Cassius Cabral MD  CRNA: JONH Ivey-CRNA  Perfusionist: Fiona Quiñonez  Estimated Blood Loss: 250mL  Intra-op Medications: * Intraprocedure medication information is unavailable because the case start and end events have not been set *           Anesthesia Record               Intraprocedure I/O Totals           Intake    Dexmedetomidine 0.00 mL    The total shown is the total volume documented since Anesthesia Start was filed.    Insulin Drip 0.00 mL    The total shown is the total volume documented since Anesthesia Start was filed.    Nitroglycerin Drip 0.00 mL    The total shown is the total volume documented since Anesthesia Start was filed.    albumin human 5 % infusion 12.5 g 500.00 mL    aminocaproic acid (Amicar) 10 g in dextrose 5% 100 mL bolus 200.00 mL    ceFAZolin (Ancef) IVPB 2 g/100 mL D5 (premix) 120.00 mL    Cell Saver 623 mL    Total Intake 1218 mL       Output    Urine 898 mL    Total Output 898 mL       Net    Net Volume 320 mL          Specimen: No specimens collected     Staff:   Circulator: Donna Rosales Person: Mandy Sutherland Circulator: Brenda Sutherland Scrub: Marcella         Drains and/or Catheters:   Chest Tube 1 Left Pleural 28 Fr (Active)       Chest Tube 2 Mediastinal 28 Fr (Active)       Chest Tube 3 Right Pleural 28 Fr (Active)       Urethral Catheter Temperature probe;Straight-tip 16 Fr. (Active)       Tourniquet Times:     Total Tourniquet Time Documented:  Arm - Upper (Left) - 35 minutes  Total: Arm - Upper (Left) - 35 minutes      Implants:  Implants       Type Name Action Serial No.       PENDITURE CLIP [id number] Implanted               Findings: The mammary artery was a good conduit, approximately 2 mm in diameter and had very little calcification in the wall.  The radial artery conduit was goo quality, and was approximately 2 mm in diameter.  The saphenous vein was 5 mm  in diameter and had some varicosities.     The LAD was 2.25 mm with severe diffuse calcification in the walls, it was occluded. I grafted in the middle third.  The first marginal circumflex coronary artery was 1.5 mm in diameter and had heavy severe calcification in the walls.     The right posterior descenidng coronary artery was 1 mm in diameter, and had minimal calcification, but the mid and Distal RCA was a heavily  calcified artery.  The LV and RV function were normal. There was no valvular disease, before and after cardiopulmonary bypass. The heart  from bypass with no inotrope and no vasopressor support.     Bypass times:   CPB: 163 min   XCT:  114 min    Medistem flows:   LIIMA to LAD:  42 ml/min with a PI of 1.8  RA to OM1: 20 ml/min with a PI of 3.5  SVG to RPDA: 15 ml/min with a PI of 5.0    The left atrial appendage was small, there was no thrombus in the appendage.  He is participating in the CLIP-IT trial, and has agreed/consented.     Indications: Montana Kern is an 44 y.o. male who is having surgery for Coronary artery disease involving native coronary artery of native heart, unspecified whether angina present [I25.10]. Mr. Montana Kern is an 44 y.o. male, who is a patient of Dr. Leonardo Muller, DO Dr. Rome Guevara did his C.   I have been asked to see them by Darlin Anderson to evaluate multivessel coronary artery disease.  He gets angina symptoms with  cutting grass, and going up stairs to the bedroom. He got chest pain today running in from the rain, to the office. Mr. Montana Kern is an 44 y.o. male who presents with multivessel coronary artery disease.  This is associated with HTN, HLD, DMII; and in the setting of OA (ankles).  Their symptoms include Dyspnea and Chest Pain.  He has CCS class III angina and NYHA I heart failure symptoms. Their imaging is consistent with multivessel coronary artery disease.      We had a nice discussion regarding the indications for intervention on their coronary artery disease.  This included percutaneous as well as open surgical approaches. I do believe that surgical coronary revascularization is in his best interest, given the coronary anatomy and presentation of his symptoms.  He understands that the goal of this procedure will be to relieve symptoms if present, preserve left ventricular function, prevent future myocardial infarction, and prolong life.  I  discussed the specific risks of bleeding, infection, transfusion, myocardial infarction, stroke, renal failure, up to and including death.    The patient was seen in the preoperative area. The risks, benefits, complications, treatment options, non-operative alternatives, expected recovery and outcomes were discussed with the patient. The possibilities of reaction to medication, pulmonary aspiration, injury to surrounding structures, bleeding, recurrent infection, the need for additional procedures, failure to diagnose a condition, and creating a complication requiring transfusion or operation were discussed with the patient. The patient concurred with the proposed plan, giving informed consent.  The site of surgery was properly noted/marked if necessary per policy. The patient has been actively warmed in preoperative area. Preoperative antibiotics have been ordered and given within 1 hours of incision. Venous thrombosis prophylaxis have been ordered including chemical prophylaxis    Procedure Details: After informed consent, and positive identification, the patient was brought to the operative theatre. They were placed on the operating room table in the supine position and an arterial monitoring line was placed. They subsequently  underwent induction of anesthesia, and aida-tracheal intubation.  A FLORESITA probe was placed in the esophagus, and central intravenous access obtained. They were then prepped and draped in a sterile surgical fashion.      A surgical time-out was performed with the correct patient, correct procedure, laterality, timing and dosage of antibiotics, availability of blood, administration of beta blocker, fire risk, and sterility of  instruments were all verified, before beginning the case.     CANNULATION AND INITIATION OF CARDIOPULMONARY OXYGENATOR:  A midline incision was made on the chest, Bovie electrocautery was used to dissect down to the anterior table of the sternum.  A reciprocating saw was  used to make a sternotomy.  The left internal mammary was harvested in a skeletonized fashion.  5000 Unit(s) of heparin was administered prior to division.  Concomitantly, the right greater saphenous vein and left radial artery were harvested using endoscopic techniques.  The pericardium was divided and reflected laterally creating a cradle.  The patient was systemically heparinized to an ACT of > 400 sec.  The distal ascending aorta and right atrium were cannulated. The patient was started on cardiopulmonary bypass at 2.2 L/min/m2.  A root needle vent was placed in the proximal ascending aorta.  A retrograde cardioplegia catheter was placed in the coronary sinus.  The left mammary artery was prepared for anastomosis.       An aortic cross-clamp was placed, and the heart was arrested in diastole with antegrade cold cardioplegia, non-selectively.  The heart received cardioplegia at 15 min intervals, in a retrograde, as well as antegrade fashion down the conduit grafts, as well as the coronary sinus.    LIGAMENT OF ZAHRA DIVISION, AND LEFT ATRIAL APPENDAGE LIGATION:  The ligament of zahra was divided with electrocautery.  The left atrial appendage was evaluated and measured.  Epicardial fat adhesions were dissected off of the base of the left atrial appendage with electrocautery.  The appropriately sized clip was placed flush at the base of the appendage.     LEFT POSTERIOR PERICARDIAL WINDOW:  The pericardial well was evaluated.  The left phrenic nerve was identified.  A 3 cm elliptical excision of pericardium was performed using electrocautery.  This was ensured to be below the phrenic nerve, and into the left pleural space.     SURGICAL CORONARY GRAFTING:  The right posterior descending coronary artery was isolated.  An arteriotomy was performed and elongated with Boykin scissors.  The reversed saphenous vein was then anastomosed, end-to-side, using a 7-0 polypropylene suture in a running fashion.  This was  tested and found to be hemostatic.   The first marginal circumflex was isolated, an arteriotomy was performed and elongated with Boykin scissors.  The radial artery was anastomosed end-to-side with a 7-0 polypropylene suture in a running fashion.  This was tested, and found to be hemostatic.  Next attention was placed to the left anterior descending coronary artery. The LAD was isolated, an arteriotomy was performed, and elongated with Boykin scissors. The in situ left internal mammary artery was anastomosed to the left anterior descending coronary artery, end-to-side, using a 7-0 polypropylene suture, in a running fashion.  Two arteriotomies were made in the ascending aorta, and enlarged with a 4.0 mm punch.  The RA and SVG grafts were proximally anastomosed to the ascending aorta, end to side, with a 6-0 polypropylene suture in a running fashion.      Warm blood cardioplegia was given retrograde, via the coronary sinus.      There was bleeding noted from the radial artery luong.  It was not repairable.  I elected to place a partial aortic clamp and re-did the proximal RA anastomosis with a 7-0 polypropylene suture, in running fashion.     The patient was placed in Trendelenburg position, the heart was de-aired, and the aortic cross-clamp was removed with the root vent on.  The patient was weaned from cardiopulmonary bypass, the venous cannula was removed from the right atrium.  The coronary sinus catheter was removed and the right atrium repaired with a 4-0 polypropylene suture.  Right ventricular unipolar epicardial pacing wires were placed. The Medistim flow probe was used to verify the flow in the bypass grafts.  The root vent was removed, and the aorta repaired with a 4-0 polypropylene suture.  Protamine was administered, and the aortic cannula was removed and the aorta repaired with two 4-0 polypropylene suture purse-strings. After full protamine reversal, the right atrium was repaired with a 4-0 polypropylene  purse-string suture.  Hemostasis was achieved throughout the mediastinum.    Three 28 Fr chest tubes were placed, one in the left  pleural space, through the left posterior pericardial window, one in the right pleural space, and one in the anterior mediastinum.  The pericardium was loosely approximated over the right ventricle and aorta with 2-0 silk suture in an interrupted fashion.    The sternum was re-approximated using #6 stainless steel wires, in an interrupted fashion.  The fascia was closed with 0 polysorb suture, in a running fashion.  The subcutaneous layer was closed with 2-0 polysorb suture, in a running fashion.  The subcuticular layer was closed in a running fashion with a 3-0 polysorb suture.     The needle, instrument and sponge count were correct x 2.  The patient was taken to the CTICU in stable condition.   Complications:  None; patient tolerated the procedure well.    Disposition: ICU - intubated and hemodynamically stable.  Condition: stable       Bernabe Zhao  Phone Number: [phone]

## 2024-08-23 NOTE — ANESTHESIA PROCEDURE NOTES
Arterial Line:    Date/Time: 8/23/2024 12:16 PM    Staffing  Performed: attending and CRNA   Authorized by: Cassius Cabral MD    Performed by: JONH Ivey-CRNA    An arterial line was placed. Procedure performed using ultrasound guidance and surface landmarks.in the OR for the following indication(s): continuous blood pressure monitoring and blood sampling needed.    A 20 gauge (size), 5 cm (length), Arrow (type) catheter was placed into the Right radial artery, secured by tapeEvents:  greater than 3 attempts and patient tolerated procedure well with no complications.      Additional notes:  Attempt x2 by CRNA. Attempt x2 by MD.

## 2024-08-23 NOTE — ANESTHESIA PROCEDURE NOTES
Airway  Date/Time: 8/23/2024 12:37 PM  Urgency: elective    Airway not difficult    Staffing  Performed: CRNA   Authorized by: Cassius Cabral MD    Performed by: RICKY Ivey  Patient location during procedure: OR    Indications and Patient Condition  Indications for airway management: anesthesia  Spontaneous Ventilation: absent  Sedation level: deep  Preoxygenated: yes  Patient position: sniffing  MILS maintained throughout  Mask difficulty assessment: 1 - vent by mask  No planned trial extubation    Final Airway Details  Final airway type: endotracheal airway      Successful airway: ETT  Cuffed: yes   Successful intubation technique: video laryngoscopy (Miranda)  Facilitating devices/methods: intubating stylet  Endotracheal tube insertion site: oral  Blade: Simeon  Blade size: #4  ETT size (mm): 7.5  Cormack-Lehane Classification: grade I - full view of glottis  Placement verified by: chest auscultation and capnometry   Cuff volume (mL): 10  Measured from: lips  ETT to lips (cm): 22  Number of attempts at approach: 1  Ventilation between attempts: none  Number of other approaches attempted: 0

## 2024-08-24 ENCOUNTER — APPOINTMENT (OUTPATIENT)
Dept: RADIOLOGY | Facility: HOSPITAL | Age: 44
DRG: 236 | End: 2024-08-24
Payer: COMMERCIAL

## 2024-08-24 ENCOUNTER — APPOINTMENT (OUTPATIENT)
Dept: CARDIOLOGY | Facility: HOSPITAL | Age: 44
DRG: 236 | End: 2024-08-24
Payer: COMMERCIAL

## 2024-08-24 LAB
ALBUMIN SERPL BCP-MCNC: 3.3 G/DL (ref 3.4–5)
ANION GAP SERPL CALC-SCNC: 10 MMOL/L (ref 10–20)
BUN SERPL-MCNC: 11 MG/DL (ref 6–23)
CA-I BLD-SCNC: 1.07 MMOL/L (ref 1.1–1.33)
CALCIUM SERPL-MCNC: 7.8 MG/DL (ref 8.6–10.3)
CHLORIDE SERPL-SCNC: 105 MMOL/L (ref 98–107)
CO2 SERPL-SCNC: 24 MMOL/L (ref 21–32)
CREAT SERPL-MCNC: 0.62 MG/DL (ref 0.5–1.3)
EGFRCR SERPLBLD CKD-EPI 2021: >90 ML/MIN/1.73M*2
ERYTHROCYTE [DISTWIDTH] IN BLOOD BY AUTOMATED COUNT: 12.4 % (ref 11.5–14.5)
GLUCOSE BLD MANUAL STRIP-MCNC: 154 MG/DL (ref 74–99)
GLUCOSE BLD MANUAL STRIP-MCNC: 158 MG/DL (ref 74–99)
GLUCOSE BLD MANUAL STRIP-MCNC: 174 MG/DL (ref 74–99)
GLUCOSE BLD MANUAL STRIP-MCNC: 216 MG/DL (ref 74–99)
GLUCOSE BLD MANUAL STRIP-MCNC: 243 MG/DL (ref 74–99)
GLUCOSE BLD MANUAL STRIP-MCNC: 253 MG/DL (ref 74–99)
GLUCOSE BLD MANUAL STRIP-MCNC: 280 MG/DL (ref 74–99)
GLUCOSE SERPL-MCNC: 181 MG/DL (ref 74–99)
HCT VFR BLD AUTO: 31.8 % (ref 41–52)
HGB BLD-MCNC: 10.6 G/DL (ref 13.5–17.5)
MAGNESIUM SERPL-MCNC: 1.7 MG/DL (ref 1.6–2.4)
MCH RBC QN AUTO: 27.3 PG (ref 26–34)
MCHC RBC AUTO-ENTMCNC: 33.3 G/DL (ref 32–36)
MCV RBC AUTO: 82 FL (ref 80–100)
NRBC BLD-RTO: 0 /100 WBCS (ref 0–0)
PHOSPHATE SERPL-MCNC: 3.8 MG/DL (ref 2.5–4.9)
PLATELET # BLD AUTO: 130 X10*3/UL (ref 150–450)
POTASSIUM SERPL-SCNC: 3.9 MMOL/L (ref 3.5–5.3)
RBC # BLD AUTO: 3.88 X10*6/UL (ref 4.5–5.9)
SODIUM SERPL-SCNC: 135 MMOL/L (ref 136–145)
WBC # BLD AUTO: 7.6 X10*3/UL (ref 4.4–11.3)

## 2024-08-24 PROCEDURE — 2500000001 HC RX 250 WO HCPCS SELF ADMINISTERED DRUGS (ALT 637 FOR MEDICARE OP): Performed by: NURSE PRACTITIONER

## 2024-08-24 PROCEDURE — 2500000004 HC RX 250 GENERAL PHARMACY W/ HCPCS (ALT 636 FOR OP/ED): Mod: JZ

## 2024-08-24 PROCEDURE — 71045 X-RAY EXAM CHEST 1 VIEW: CPT

## 2024-08-24 PROCEDURE — 82330 ASSAY OF CALCIUM: CPT

## 2024-08-24 PROCEDURE — 85027 COMPLETE CBC AUTOMATED: CPT | Performed by: THORACIC SURGERY (CARDIOTHORACIC VASCULAR SURGERY)

## 2024-08-24 PROCEDURE — 71045 X-RAY EXAM CHEST 1 VIEW: CPT | Performed by: RADIOLOGY

## 2024-08-24 PROCEDURE — 37799 UNLISTED PX VASCULAR SURGERY: CPT

## 2024-08-24 PROCEDURE — 93005 ELECTROCARDIOGRAM TRACING: CPT

## 2024-08-24 PROCEDURE — 99233 SBSQ HOSP IP/OBS HIGH 50: CPT | Performed by: NURSE PRACTITIONER

## 2024-08-24 PROCEDURE — 97161 PT EVAL LOW COMPLEX 20 MIN: CPT | Mod: GP

## 2024-08-24 PROCEDURE — 80069 RENAL FUNCTION PANEL: CPT | Performed by: THORACIC SURGERY (CARDIOTHORACIC VASCULAR SURGERY)

## 2024-08-24 PROCEDURE — 82947 ASSAY GLUCOSE BLOOD QUANT: CPT

## 2024-08-24 PROCEDURE — 2500000002 HC RX 250 W HCPCS SELF ADMINISTERED DRUGS (ALT 637 FOR MEDICARE OP, ALT 636 FOR OP/ED)

## 2024-08-24 PROCEDURE — 83735 ASSAY OF MAGNESIUM: CPT | Performed by: THORACIC SURGERY (CARDIOTHORACIC VASCULAR SURGERY)

## 2024-08-24 PROCEDURE — 93010 ELECTROCARDIOGRAM REPORT: CPT | Performed by: INTERNAL MEDICINE

## 2024-08-24 PROCEDURE — 97165 OT EVAL LOW COMPLEX 30 MIN: CPT | Mod: GO

## 2024-08-24 PROCEDURE — 2500000001 HC RX 250 WO HCPCS SELF ADMINISTERED DRUGS (ALT 637 FOR MEDICARE OP)

## 2024-08-24 PROCEDURE — 99291 CRITICAL CARE FIRST HOUR: CPT

## 2024-08-24 PROCEDURE — 2060000001 HC INTERMEDIATE ICU ROOM DAILY

## 2024-08-24 RX ORDER — ISOSORBIDE DINITRATE 10 MG/1
10 TABLET ORAL
Status: DISCONTINUED | OUTPATIENT
Start: 2024-08-24 | End: 2024-08-27 | Stop reason: HOSPADM

## 2024-08-24 RX ORDER — HYDROMORPHONE HYDROCHLORIDE 0.2 MG/ML
0.2 INJECTION INTRAMUSCULAR; INTRAVENOUS; SUBCUTANEOUS EVERY 2 HOUR PRN
Status: DISCONTINUED | OUTPATIENT
Start: 2024-08-24 | End: 2024-08-27 | Stop reason: HOSPADM

## 2024-08-24 RX ORDER — METOPROLOL TARTRATE 25 MG/1
12.5 TABLET, FILM COATED ORAL 2 TIMES DAILY
Status: DISCONTINUED | OUTPATIENT
Start: 2024-08-24 | End: 2024-08-25

## 2024-08-24 SDOH — SOCIAL STABILITY: SOCIAL INSECURITY: ARE THERE ANY APPARENT SIGNS OF INJURIES/BEHAVIORS THAT COULD BE RELATED TO ABUSE/NEGLECT?: NO

## 2024-08-24 SDOH — SOCIAL STABILITY: SOCIAL INSECURITY: DOES ANYONE TRY TO KEEP YOU FROM HAVING/CONTACTING OTHER FRIENDS OR DOING THINGS OUTSIDE YOUR HOME?: NO

## 2024-08-24 SDOH — SOCIAL STABILITY: SOCIAL INSECURITY: ARE YOU OR HAVE YOU BEEN THREATENED OR ABUSED PHYSICALLY, EMOTIONALLY, OR SEXUALLY BY ANYONE?: NO

## 2024-08-24 SDOH — SOCIAL STABILITY: SOCIAL INSECURITY: WERE YOU ABLE TO COMPLETE ALL THE BEHAVIORAL HEALTH SCREENINGS?: YES

## 2024-08-24 SDOH — SOCIAL STABILITY: SOCIAL INSECURITY: DO YOU FEEL ANYONE HAS EXPLOITED OR TAKEN ADVANTAGE OF YOU FINANCIALLY OR OF YOUR PERSONAL PROPERTY?: NO

## 2024-08-24 SDOH — SOCIAL STABILITY: SOCIAL INSECURITY: HAS ANYONE EVER THREATENED TO HURT YOUR FAMILY OR YOUR PETS?: NO

## 2024-08-24 SDOH — SOCIAL STABILITY: SOCIAL INSECURITY: ABUSE: ADULT

## 2024-08-24 SDOH — SOCIAL STABILITY: SOCIAL INSECURITY: DO YOU FEEL UNSAFE GOING BACK TO THE PLACE WHERE YOU ARE LIVING?: NO

## 2024-08-24 SDOH — SOCIAL STABILITY: SOCIAL INSECURITY: HAVE YOU HAD THOUGHTS OF HARMING ANYONE ELSE?: NO

## 2024-08-24 ASSESSMENT — PAIN SCALES - GENERAL
PAINLEVEL_OUTOF10: 7
PAINLEVEL_OUTOF10: 4
PAINLEVEL_OUTOF10: 2
PAINLEVEL_OUTOF10: 0 - NO PAIN
PAINLEVEL_OUTOF10: 0 - NO PAIN
PAINLEVEL_OUTOF10: 2
PAINLEVEL_OUTOF10: 0 - NO PAIN
PAINLEVEL_OUTOF10: 0 - NO PAIN
PAINLEVEL_OUTOF10: 7
PAINLEVEL_OUTOF10: 9
PAINLEVEL_OUTOF10: 2
PAINLEVEL_OUTOF10: 5 - MODERATE PAIN
PAINLEVEL_OUTOF10: 1
PAINLEVEL_OUTOF10: 5 - MODERATE PAIN
PAINLEVEL_OUTOF10: 4
PAINLEVEL_OUTOF10: 7
PAINLEVEL_OUTOF10: 7
PAINLEVEL_OUTOF10: 5 - MODERATE PAIN

## 2024-08-24 ASSESSMENT — COGNITIVE AND FUNCTIONAL STATUS - GENERAL
STANDING UP FROM CHAIR USING ARMS: A LITTLE
DAILY ACTIVITIY SCORE: 18
MOVING TO AND FROM BED TO CHAIR: A LOT
PERSONAL GROOMING: A LOT
DAILY ACTIVITIY SCORE: 13
MOVING FROM LYING ON BACK TO SITTING ON SIDE OF FLAT BED WITH BEDRAILS: A LOT
MOVING TO AND FROM BED TO CHAIR: A LITTLE
HELP NEEDED FOR BATHING: A LOT
STANDING UP FROM CHAIR USING ARMS: A LOT
TURNING FROM BACK TO SIDE WHILE IN FLAT BAD: A LOT
MOBILITY SCORE: 12
TOILETING: A LOT
DRESSING REGULAR UPPER BODY CLOTHING: A LITTLE
MOVING FROM LYING ON BACK TO SITTING ON SIDE OF FLAT BED WITH BEDRAILS: A LITTLE
DRESSING REGULAR LOWER BODY CLOTHING: A LOT
CLIMB 3 TO 5 STEPS WITH RAILING: TOTAL
TOILETING: A LOT
CLIMB 3 TO 5 STEPS WITH RAILING: A LOT
PATIENT BASELINE BEDBOUND: NO
EATING MEALS: A LITTLE
HELP NEEDED FOR BATHING: A LITTLE
WALKING IN HOSPITAL ROOM: A LITTLE
TURNING FROM BACK TO SIDE WHILE IN FLAT BAD: A LOT
MOBILITY SCORE: 15
DRESSING REGULAR UPPER BODY CLOTHING: A LOT
DRESSING REGULAR LOWER BODY CLOTHING: A LOT
WALKING IN HOSPITAL ROOM: A LOT

## 2024-08-24 ASSESSMENT — ACTIVITIES OF DAILY LIVING (ADL)
WALKS IN HOME: NEEDS ASSISTANCE
HEARING - LEFT EAR: FUNCTIONAL
TOILETING: NEEDS ASSISTANCE
ADEQUATE_TO_COMPLETE_ADL: YES
PATIENT'S MEMORY ADEQUATE TO SAFELY COMPLETE DAILY ACTIVITIES?: YES
BATHING: NEEDS ASSISTANCE
LACK_OF_TRANSPORTATION: NO
GROOMING: NEEDS ASSISTANCE
JUDGMENT_ADEQUATE_SAFELY_COMPLETE_DAILY_ACTIVITIES: YES
HEARING - RIGHT EAR: FUNCTIONAL
DRESSING YOURSELF: NEEDS ASSISTANCE
FEEDING YOURSELF: INDEPENDENT

## 2024-08-24 ASSESSMENT — PATIENT HEALTH QUESTIONNAIRE - PHQ9
1. LITTLE INTEREST OR PLEASURE IN DOING THINGS: NOT AT ALL
2. FEELING DOWN, DEPRESSED OR HOPELESS: NOT AT ALL
SUM OF ALL RESPONSES TO PHQ9 QUESTIONS 1 & 2: 0

## 2024-08-24 ASSESSMENT — ENCOUNTER SYMPTOMS
POOR WOUND HEALING: 0
SHORTNESS OF BREATH: 0
STIFFNESS: 0
DIZZINESS: 0
WEAKNESS: 1
ALTERED MENTAL STATUS: 0
DYSPNEA ON EXERTION: 1
PALPITATIONS: 0
NAIL CHANGES: 0
COUGH: 1
FREQUENCY: 0
ORTHOPNEA: 0
IRREGULAR HEARTBEAT: 0
DECREASED APPETITE: 0
LIGHT-HEADEDNESS: 0
VOMITING: 0
BLOATING: 0
BLURRED VISION: 0
MUSCLE CRAMPS: 0
FOCAL WEAKNESS: 0
DOUBLE VISION: 0
MYALGIAS: 0
NAUSEA: 0

## 2024-08-24 ASSESSMENT — LIFESTYLE VARIABLES
SKIP TO QUESTIONS 9-10: 0
AUDIT-C TOTAL SCORE: 3
HOW OFTEN DO YOU HAVE A DRINK CONTAINING ALCOHOL: 2-4 TIMES A MONTH
HOW OFTEN DO YOU HAVE 6 OR MORE DRINKS ON ONE OCCASION: NEVER
HOW MANY STANDARD DRINKS CONTAINING ALCOHOL DO YOU HAVE ON A TYPICAL DAY: 3 OR 4
AUDIT-C TOTAL SCORE: 3

## 2024-08-24 ASSESSMENT — PAIN DESCRIPTION - LOCATION
LOCATION: BACK
LOCATION: BACK
LOCATION: CHEST
LOCATION: CHEST

## 2024-08-24 NOTE — CARE PLAN
The clinical goals for the shift include Extubate patient    Patient successfully extubated overnight. Patient otherwise stable.      Problem: ACS/CP/NSTEMI/STEMI  Goal: Chest pain managed (free from pain or at acceptable level)  Outcome: Progressing  Goal: Lab values return to normal range  Outcome: Progressing  Goal: Promote self management  Outcome: Progressing  Goal: Serial ECG will return to baseline  Outcome: Progressing     Problem: Hypertensive Emergency/Crisis  Goal: Blood pressure gradually reduced to goal range  Outcome: Progressing  Goal: Promote self management  Outcome: Progressing     Problem: Fall/Injury  Goal: Verbalize understanding of personal risk factors for fall in the hospital  Outcome: Progressing  Goal: Verbalize understanding of risk factor reduction measures to prevent injury from fall in the home  Outcome: Progressing  Goal: Pace activities to prevent fatigue by end of the shift  Outcome: Progressing     Problem: ACS/CP/NSTEMI/STEMI  Goal: Wean vasopressors/achieve hemodynamic stability  Outcome: Met     Problem: Fall/Injury  Goal: Not fall by end of shift  Outcome: Met  Goal: Be free from injury by end of the shift  Outcome: Met

## 2024-08-24 NOTE — CONSULTS
Reason For Consult  S/p CABG    History Of Present Illness  Montana Kern is a 44 y.o. male never smoker with history of obesity, DM2, DLD, HTN, nephrolithiasis, ?asthma and multi-vessel CAD presenting status post 3V CABG. Critical care is consulted for post op ICU management.    Mr. Kern is currently intubated and unable to provide additional history. He had prior history of angina and was found to have multi-vessel CAD. He underwent 3V CABG (LIMA to LAD, SVG to PDA, and RA to OM), LAAL and posterior pericardial window. His surgery was completed without complication and he remained stable on the vent and off of pressors.      Past Medical History  He has a past medical history of Acute bronchitis due to other specified organisms (11/26/2020), Asthma ([facility]), Diabetes (Multi), Hyperlipidemia, Hypertension, Kidney stones, Osteoarthritis, and Personal history of other specified conditions (11/26/2020).    Surgical History  He has a past surgical history that includes Vasectomy; Multiple tooth extractions; Hernia repair; and Cardiac catheterization (N/A, 7/26/2024).     Social History  He reports that he has never smoked. He has never used smokeless tobacco. He reports current alcohol use. He reports that he does not use drugs.    Family History  Family History   Problem Relation Name Age of Onset    Heart disease Mother      Stroke Father      Heart attack Father          Allergies  Patient has no known allergies.    Review of Systems  Unable to obtain due to intubated/sedated status     Physical Exam  Physical Exam  Constitutional:       Comments: Intubated, sedated, awakens to voice but rapidly falls back asleep, no acute distress   HENT:      Head: Normocephalic and atraumatic.      Mouth/Throat:      Mouth: Mucous membranes are moist.   Eyes:      General: No scleral icterus.     Conjunctiva/sclera: Conjunctivae normal.   Cardiovascular:      Rate and Rhythm: Normal rate and regular rhythm.      Heart  "sounds: No murmur heard.     Friction rub (friction rub from chest tube) present. No gallop.   Pulmonary:      Comments: Seen on PS mode, breathing comfortably on the vent, no acute distress. Good air entry bilaterally, no wheezes/crackles/ronchi. Chest tubes in place with minimal sanguinous output  Abdominal:      General: Bowel sounds are normal. There is no distension.      Palpations: Abdomen is soft.      Tenderness: There is no abdominal tenderness.   Musculoskeletal:      Right lower leg: No edema.      Left lower leg: No edema.   Skin:     General: Skin is warm and dry.   Neurological:      Comments: Sedated, awakens to voice, rapidly falls back to sleep            Last Recorded Vitals  Blood pressure 105/55, pulse 82, temperature 36.3 °C (97.3 °F), temperature source Temporal, resp. rate 16, height 1.753 m (5' 9.02\"), weight 96 kg (211 lb 10.3 oz), SpO2 99%.    Relevant Results  Results for orders placed or performed during the hospital encounter of 08/23/24 (from the past 24 hour(s))   POCT GLUCOSE   Result Value Ref Range    POCT Glucose 196 (H) 74 - 99 mg/dL   Blood Gas Arterial Full Panel Unsolicited   Result Value Ref Range    POCT pH, Arterial 7.42 7.38 - 7.42 pH    POCT pCO2, Arterial 41 38 - 42 mm Hg    POCT pO2, Arterial 415 (H) 85 - 95 mm Hg    POCT SO2, Arterial 100 94 - 100 %    POCT Oxy Hemoglobin, Arterial 97.9 94.0 - 98.0 %    POCT Hematocrit Calculated, Arterial 37.0 (L) 41.0 - 52.0 %    POCT Sodium, Arterial 133 (L) 136 - 145 mmol/L    POCT Potassium, Arterial 4.1 3.5 - 5.3 mmol/L    POCT Chloride, Arterial 104 98 - 107 mmol/L    POCT Ionized Calcium, Arterial 1.12 1.10 - 1.33 mmol/L    POCT Glucose, Arterial 196 (H) 74 - 99 mg/dL    POCT Lactate, Arterial 1.5 0.4 - 2.0 mmol/L    POCT Base Excess, Arterial 1.9 -2.0 - 3.0 mmol/L    POCT HCO3 Calculated, Arterial 26.6 (H) 22.0 - 26.0 mmol/L    POCT Hemoglobin, Arterial 12.3 (L) 13.5 - 17.5 g/dL    POCT Anion Gap, Arterial 7 (L) 10 - 25 mmo/L "    Patient Temperature 37.0 degrees Celsius    FiO2 100 %   Coox Panel, Arterial Unsolicited   Result Value Ref Range    POCT Hemoglobin, Arterial 12.3 (L) 13.5 - 17.5 g/dL    POCT Oxy Hemoglobin, Arterial 97.9 94.0 - 98.0 %    POCT Carboxyhemoglobin, Arterial 1.1 %    POCT Methemoglobin, Arterial 0.8 0.0 - 1.5 %    POCT Deoxy Hemoglobin, Arterial 0.3 0.0 - 5.0 %   Blood Gas Arterial Full Panel Unsolicited   Result Value Ref Range    POCT pH, Arterial 7.37 (L) 7.38 - 7.42 pH    POCT pCO2, Arterial 45 (H) 38 - 42 mm Hg    POCT pO2, Arterial 403 (H) 85 - 95 mm Hg    POCT SO2, Arterial 100 94 - 100 %    POCT Oxy Hemoglobin, Arterial 97.3 94.0 - 98.0 %    POCT Hematocrit Calculated, Arterial 36.0 (L) 41.0 - 52.0 %    POCT Sodium, Arterial 132 (L) 136 - 145 mmol/L    POCT Potassium, Arterial 3.7 3.5 - 5.3 mmol/L    POCT Chloride, Arterial 106 98 - 107 mmol/L    POCT Ionized Calcium, Arterial 1.07 (L) 1.10 - 1.33 mmol/L    POCT Glucose, Arterial 165 (H) 74 - 99 mg/dL    POCT Lactate, Arterial 1.6 0.4 - 2.0 mmol/L    POCT Base Excess, Arterial 0.4 -2.0 - 3.0 mmol/L    POCT HCO3 Calculated, Arterial 26.0 22.0 - 26.0 mmol/L    POCT Hemoglobin, Arterial 12.0 (L) 13.5 - 17.5 g/dL    POCT Anion Gap, Arterial 4 (L) 10 - 25 mmo/L    Patient Temperature 37.0 degrees Celsius    FiO2 100 %   Coox Panel, Arterial Unsolicited   Result Value Ref Range    POCT Hemoglobin, Arterial 12.0 (L) 13.5 - 17.5 g/dL    POCT Oxy Hemoglobin, Arterial 97.3 94.0 - 98.0 %    POCT Carboxyhemoglobin, Arterial 1.2 %    POCT Methemoglobin, Arterial 1.2 0.0 - 1.5 %    POCT Deoxy Hemoglobin, Arterial 0.3 0.0 - 5.0 %   Blood Gas Arterial Full Panel Unsolicited   Result Value Ref Range    POCT pH, Arterial 7.39 7.38 - 7.42 pH    POCT pCO2, Arterial 42 38 - 42 mm Hg    POCT pO2, Arterial 363 (H) 85 - 95 mm Hg    POCT SO2, Arterial 100 94 - 100 %    POCT Oxy Hemoglobin, Arterial 97.1 94.0 - 98.0 %    POCT Hematocrit Calculated, Arterial 35.0 (L) 41.0 - 52.0 %     POCT Sodium, Arterial 134 (L) 136 - 145 mmol/L    POCT Potassium, Arterial 3.8 3.5 - 5.3 mmol/L    POCT Chloride, Arterial 105 98 - 107 mmol/L    POCT Ionized Calcium, Arterial 1.11 1.10 - 1.33 mmol/L    POCT Glucose, Arterial 155 (H) 74 - 99 mg/dL    POCT Lactate, Arterial 1.6 0.4 - 2.0 mmol/L    POCT Base Excess, Arterial 0.3 -2.0 - 3.0 mmol/L    POCT HCO3 Calculated, Arterial 25.4 22.0 - 26.0 mmol/L    POCT Hemoglobin, Arterial 11.6 (L) 13.5 - 17.5 g/dL    POCT Anion Gap, Arterial 7 (L) 10 - 25 mmo/L    Patient Temperature 37.0 degrees Celsius    FiO2 100 %   Coox Panel, Arterial Unsolicited   Result Value Ref Range    POCT Hemoglobin, Arterial 11.6 (L) 13.5 - 17.5 g/dL    POCT Oxy Hemoglobin, Arterial 97.1 94.0 - 98.0 %    POCT Carboxyhemoglobin, Arterial 1.1 %    POCT Methemoglobin, Arterial 1.3 0.0 - 1.5 %    POCT Deoxy Hemoglobin, Arterial 0.5 0.0 - 5.0 %   Blood Gas Arterial Full Panel Unsolicited   Result Value Ref Range    POCT pH, Arterial 7.41 7.38 - 7.42 pH    POCT pCO2, Arterial 45 (H) 38 - 42 mm Hg    POCT pO2, Arterial 259 (H) 85 - 95 mm Hg    POCT SO2, Arterial 100 94 - 100 %    POCT Oxy Hemoglobin, Arterial 97.7 94.0 - 98.0 %    POCT Hematocrit Calculated, Arterial 30.0 (L) 41.0 - 52.0 %    POCT Sodium, Arterial 135 (L) 136 - 145 mmol/L    POCT Potassium, Arterial 4.8 3.5 - 5.3 mmol/L    POCT Chloride, Arterial 105 98 - 107 mmol/L    POCT Ionized Calcium, Arterial 1.07 (L) 1.10 - 1.33 mmol/L    POCT Glucose, Arterial 121 (H) 74 - 99 mg/dL    POCT Lactate, Arterial 1.9 0.4 - 2.0 mmol/L    POCT Base Excess, Arterial 3.4 (H) -2.0 - 3.0 mmol/L    POCT HCO3 Calculated, Arterial 28.5 (H) 22.0 - 26.0 mmol/L    POCT Hemoglobin, Arterial 10.1 (L) 13.5 - 17.5 g/dL    POCT Anion Gap, Arterial 6 (L) 10 - 25 mmo/L    Patient Temperature 37.0 degrees Celsius    FiO2 70 %   Coox Panel, Arterial Unsolicited   Result Value Ref Range    POCT Hemoglobin, Arterial 10.1 (L) 13.5 - 17.5 g/dL    POCT Oxy Hemoglobin,  Arterial 97.7 94.0 - 98.0 %    POCT Carboxyhemoglobin, Arterial 1.0 %    POCT Methemoglobin, Arterial 0.8 0.0 - 1.5 %    POCT Deoxy Hemoglobin, Arterial 0.4 0.0 - 5.0 %   Blood Gas Venous Full Panel Unsolicited   Result Value Ref Range    POCT pH, Venous 7.33 7.33 - 7.43 pH    POCT pCO2, Venous 53 (H) 41 - 51 mm Hg    POCT pO2, Venous 50 (H) 35 - 45 mm Hg    POCT SO2, Venous 81 (H) 45 - 75 %    POCT Oxy Hemoglobin, Venous 79.1 (H) 45.0 - 75.0 %    POCT Hematocrit Calculated, Venous 31.0 (L) 41.0 - 52.0 %    POCT Sodium, Venous 136 136 - 145 mmol/L    POCT Potassium, Venous 4.6 3.5 - 5.3 mmol/L    POCT Chloride, Venous 103 98 - 107 mmol/L    POCT Ionized Calicum, Venous 1.14 1.10 - 1.33 mmol/L    POCT Glucose, Venous 124 (H) 74 - 99 mg/dL    POCT Lactate, Venous 1.9 0.4 - 2.0 mmol/L    POCT Base Excess, Venous 1.3 -2.0 - 3.0 mmol/L    POCT HCO3 Calculated, Venous 27.9 (H) 22.0 - 26.0 mmol/L    POCT Hemoglobin, Venous 10.2 (L) 13.5 - 17.5 g/dL    POCT Anion Gap, Venous 10.0 10.0 - 25.0 mmol/L    Patient Temperature 37.0 degrees Celsius    FiO2 70 %   Coox Panel, Venous Unsolicited   Result Value Ref Range    POCT Carboxyhemoglobin, Venous 1.4 %    POCT Methemoglobin, Venous 1.1 0.0 - 1.5 %   Blood Gas Arterial Full Panel Unsolicited   Result Value Ref Range    POCT pH, Arterial 7.39 7.38 - 7.42 pH    POCT pCO2, Arterial 43 (H) 38 - 42 mm Hg    POCT pO2, Arterial 321 (H) 85 - 95 mm Hg    POCT SO2, Arterial 99 94 - 100 %    POCT Oxy Hemoglobin, Arterial 97.7 94.0 - 98.0 %    POCT Hematocrit Calculated, Arterial 30.0 (L) 41.0 - 52.0 %    POCT Sodium, Arterial 134 (L) 136 - 145 mmol/L    POCT Potassium, Arterial 4.7 3.5 - 5.3 mmol/L    POCT Chloride, Arterial 106 98 - 107 mmol/L    POCT Ionized Calcium, Arterial 1.11 1.10 - 1.33 mmol/L    POCT Glucose, Arterial 133 (H) 74 - 99 mg/dL    POCT Lactate, Arterial 2.0 0.4 - 2.0 mmol/L    POCT Base Excess, Arterial 0.8 -2.0 - 3.0 mmol/L    POCT HCO3 Calculated, Arterial 26.0  22.0 - 26.0 mmol/L    POCT Hemoglobin, Arterial 10.1 (L) 13.5 - 17.5 g/dL    POCT Anion Gap, Arterial 7 (L) 10 - 25 mmo/L    Patient Temperature 37.0 degrees Celsius    FiO2 75 %   Coox Panel, Arterial Unsolicited   Result Value Ref Range    POCT Hemoglobin, Arterial 10.1 (L) 13.5 - 17.5 g/dL    POCT Oxy Hemoglobin, Arterial 97.7 94.0 - 98.0 %    POCT Carboxyhemoglobin, Arterial 0.7 %    POCT Methemoglobin, Arterial 0.6 0.0 - 1.5 %    POCT Deoxy Hemoglobin, Arterial 1.0 0.0 - 5.0 %   Blood Gas Arterial Full Panel Unsolicited   Result Value Ref Range    POCT pH, Arterial 7.36 (L) 7.38 - 7.42 pH    POCT pCO2, Arterial 46 (H) 38 - 42 mm Hg    POCT pO2, Arterial 309 (H) 85 - 95 mm Hg    POCT SO2, Arterial 99 94 - 100 %    POCT Oxy Hemoglobin, Arterial 97.2 94.0 - 98.0 %    POCT Hematocrit Calculated, Arterial 31.0 (L) 41.0 - 52.0 %    POCT Sodium, Arterial 135 (L) 136 - 145 mmol/L    POCT Potassium, Arterial 4.2 3.5 - 5.3 mmol/L    POCT Chloride, Arterial 104 98 - 107 mmol/L    POCT Ionized Calcium, Arterial 1.14 1.10 - 1.33 mmol/L    POCT Glucose, Arterial 148 (H) 74 - 99 mg/dL    POCT Lactate, Arterial 1.8 0.4 - 2.0 mmol/L    POCT Base Excess, Arterial 0.3 -2.0 - 3.0 mmol/L    POCT HCO3 Calculated, Arterial 26.0 22.0 - 26.0 mmol/L    POCT Hemoglobin, Arterial 10.2 (L) 13.5 - 17.5 g/dL    POCT Anion Gap, Arterial 9 (L) 10 - 25 mmo/L    Patient Temperature 37.0 degrees Celsius    FiO2 75 %   Coox Panel, Arterial Unsolicited   Result Value Ref Range    POCT Hemoglobin, Arterial 10.2 (L) 13.5 - 17.5 g/dL    POCT Oxy Hemoglobin, Arterial 97.2 94.0 - 98.0 %    POCT Carboxyhemoglobin, Arterial 0.9 %    POCT Methemoglobin, Arterial 1.0 0.0 - 1.5 %    POCT Deoxy Hemoglobin, Arterial 0.9 0.0 - 5.0 %   Blood Gas Arterial Full Panel Unsolicited   Result Value Ref Range    POCT pH, Arterial 7.41 7.38 - 7.42 pH    POCT pCO2, Arterial 40 38 - 42 mm Hg    POCT pO2, Arterial 254 (H) 85 - 95 mm Hg    POCT SO2, Arterial 99 94 - 100 %     POCT Oxy Hemoglobin, Arterial 97.2 94.0 - 98.0 %    POCT Hematocrit Calculated, Arterial 31.0 (L) 41.0 - 52.0 %    POCT Sodium, Arterial 135 (L) 136 - 145 mmol/L    POCT Potassium, Arterial 5.1 3.5 - 5.3 mmol/L    POCT Chloride, Arterial 105 98 - 107 mmol/L    POCT Ionized Calcium, Arterial 1.12 1.10 - 1.33 mmol/L    POCT Glucose, Arterial 145 (H) 74 - 99 mg/dL    POCT Lactate, Arterial 1.7 0.4 - 2.0 mmol/L    POCT Base Excess, Arterial 0.7 -2.0 - 3.0 mmol/L    POCT HCO3 Calculated, Arterial 25.4 22.0 - 26.0 mmol/L    POCT Hemoglobin, Arterial 10.2 (L) 13.5 - 17.5 g/dL    POCT Anion Gap, Arterial 10 10 - 25 mmo/L    Patient Temperature 37.0 degrees Celsius    FiO2 75 %   Coox Panel, Arterial Unsolicited   Result Value Ref Range    POCT Hemoglobin, Arterial 10.2 (L) 13.5 - 17.5 g/dL    POCT Oxy Hemoglobin, Arterial 97.2 94.0 - 98.0 %    POCT Carboxyhemoglobin, Arterial 1.0 %    POCT Methemoglobin, Arterial 0.9 0.0 - 1.5 %    POCT Deoxy Hemoglobin, Arterial 0.9 0.0 - 5.0 %   Blood Gas Arterial Full Panel Unsolicited   Result Value Ref Range    POCT pH, Arterial 7.42 7.38 - 7.42 pH    POCT pCO2, Arterial 40 38 - 42 mm Hg    POCT pO2, Arterial 282 (H) 85 - 95 mm Hg    POCT SO2, Arterial 99 94 - 100 %    POCT Oxy Hemoglobin, Arterial 97.3 94.0 - 98.0 %    POCT Hematocrit Calculated, Arterial 31.0 (L) 41.0 - 52.0 %    POCT Sodium, Arterial 134 (L) 136 - 145 mmol/L    POCT Potassium, Arterial 4.3 3.5 - 5.3 mmol/L    POCT Chloride, Arterial 105 98 - 107 mmol/L    POCT Ionized Calcium, Arterial 1.10 1.10 - 1.33 mmol/L    POCT Glucose, Arterial 149 (H) 74 - 99 mg/dL    POCT Lactate, Arterial 1.5 0.4 - 2.0 mmol/L    POCT Base Excess, Arterial 1.3 -2.0 - 3.0 mmol/L    POCT HCO3 Calculated, Arterial 25.9 22.0 - 26.0 mmol/L    POCT Hemoglobin, Arterial 10.3 (L) 13.5 - 17.5 g/dL    POCT Anion Gap, Arterial 7 (L) 10 - 25 mmo/L    Patient Temperature 37.0 degrees Celsius    FiO2 85 %   Coox Panel, Arterial Unsolicited   Result Value  Ref Range    POCT Hemoglobin, Arterial 10.3 (L) 13.5 - 17.5 g/dL    POCT Oxy Hemoglobin, Arterial 97.3 94.0 - 98.0 %    POCT Carboxyhemoglobin, Arterial 0.6 %    POCT Methemoglobin, Arterial 1.0 0.0 - 1.5 %    POCT Deoxy Hemoglobin, Arterial 1.1 0.0 - 5.0 %   Fibrinogen   Result Value Ref Range    Fibrinogen 189 (L) 200 - 400 mg/dL   Magnesium   Result Value Ref Range    Magnesium 2.25 1.60 - 2.40 mg/dL   Protime-INR   Result Value Ref Range    Protime 15.3 (H) 9.8 - 12.8 seconds    INR 1.4 (H) 0.9 - 1.1   Platelet Count   Result Value Ref Range    Platelets 142 (L) 150 - 450 x10*3/uL   aPTT   Result Value Ref Range    aPTT 30 27 - 38 seconds   Blood Gas Arterial Full Panel Unsolicited   Result Value Ref Range    POCT pH, Arterial 7.38 7.38 - 7.42 pH    POCT pCO2, Arterial 42 38 - 42 mm Hg    POCT pO2, Arterial 118 (H) 85 - 95 mm Hg    POCT SO2, Arterial 99 94 - 100 %    POCT Oxy Hemoglobin, Arterial 96.6 94.0 - 98.0 %    POCT Hematocrit Calculated, Arterial 30.0 (L) 41.0 - 52.0 %    POCT Sodium, Arterial 135 (L) 136 - 145 mmol/L    POCT Potassium, Arterial 3.7 3.5 - 5.3 mmol/L    POCT Chloride, Arterial 108 (H) 98 - 107 mmol/L    POCT Ionized Calcium, Arterial 1.20 1.10 - 1.33 mmol/L    POCT Glucose, Arterial 122 (H) 74 - 99 mg/dL    POCT Lactate, Arterial 1.4 0.4 - 2.0 mmol/L    POCT Base Excess, Arterial -0.4 -2.0 - 3.0 mmol/L    POCT HCO3 Calculated, Arterial 24.8 22.0 - 26.0 mmol/L    POCT Hemoglobin, Arterial 9.9 (L) 13.5 - 17.5 g/dL    POCT Anion Gap, Arterial 6 (L) 10 - 25 mmo/L    Patient Temperature 37.0 degrees Celsius    FiO2 100 %   Calcium, Ionized   Result Value Ref Range    POCT Calcium, Ionized 1.07 (L) 1.1 - 1.33 mmol/L   Blood Gas Arterial Full Panel   Result Value Ref Range    POCT pH, Arterial 7.35 (L) 7.38 - 7.42 pH    POCT pCO2, Arterial 41 38 - 42 mm Hg    POCT pO2, Arterial 157 (H) 85 - 95 mm Hg    POCT SO2, Arterial 99 94 - 100 %    POCT Oxy Hemoglobin, Arterial 97.2 94.0 - 98.0 %    POCT  Hematocrit Calculated, Arterial 30.0 (L) 41.0 - 52.0 %    POCT Sodium, Arterial 139 136 - 145 mmol/L    POCT Potassium, Arterial 3.5 3.5 - 5.3 mmol/L    POCT Chloride, Arterial 109 (H) 98 - 107 mmol/L    POCT Ionized Calcium, Arterial 0.97 (L) 1.10 - 1.33 mmol/L    POCT Glucose, Arterial 117 (H) 74 - 99 mg/dL    POCT Lactate, Arterial 0.8 0.4 - 2.0 mmol/L    POCT Base Excess, Arterial -2.8 (L) -2.0 - 3.0 mmol/L    POCT HCO3 Calculated, Arterial 22.6 22.0 - 26.0 mmol/L    POCT Hemoglobin, Arterial 9.9 (L) 13.5 - 17.5 g/dL    POCT Anion Gap, Arterial 11 10 - 25 mmo/L    Patient Temperature 37.0 degrees Celsius    FiO2 50 %    Apparatus      Ventilator Mode A/C     Ventilator Rate 16 bpm    Tidal Volume 500 mL    Spontaneous Tidal Volume 509 mL    Total Minute Volume 11.0 Liter    Peep CHM2O 5.0 cm H2O    Frequency (BPM) 22 bpm    Site of Arterial Puncture Arterial Line           Assessment/Plan     [place]ana Kern is a 44 y.o. male never smoker with history of obesity, DM2, DLD, HTN, nephrolithiasis, ?asthma and multi-vessel CAD presenting status post 3V CABG (LIMA to LAD, SVG to PDA, and RA to OM), LAAL and posterior pericardial window. Critical care is consulted for post op ICU management.    Neuro  #Post op pain   #Sedation/analgesia  -Wean precedex as tolerated  -Multi-modal pain control  -Resume home gabapentin    CV  #S/p 3V CABG, LAAL and posterior pericardial window  #HLD  #HTN  -Chest tubes x3 to suction, monitor output  -Monitor hemodynamics and CVP, fluids and pressors if required  -ASA/Lipitor  -Post op antibiotics per protocol  -Epicardial leads in place, not requiring pacing  -Hold metoprolol and lisinopril post op pending hemodynamic trends  -nitroglycerin ggt for radial artery harvest    Pulm  #Acute post op respiratory insufficiency  #Chart history of asthma, not on inhalers and not in exacerbation  -SBT currently taking place, extubate when appropriate  -IS, out of bed as tolerated tomorrow  -Goal  saturation 92-95%  -Monitor for signs of asthma exacerbation    GI  -GI ppx in place    Nephro  -No acute issues  -Monitor UOP    Endo  #DM2 not on insulin  #Obesity on Ozempic  #DLD  -SSI while inpatient, hold home glimepiride, metformin and ozempic  -Goal glucose 180 in ICU  -Continue home Lipitor    ID  -Post op antibiotics per protocol    Heme  #Post op anemia and thrombocytopenia- likely due to blood loss/consumption  -Trend CBC  -Transfuse for Hgb <7  -Transfuse for plt <100k in acute post op period      Lines/tubes: chest tubes x3 (8/23), ETT 8/23, quinteros 8/23, R radial a-line 8/23, Rij CVC 8/23  Prophylaxis: PPI, AC on hold in post op setting  Drips: precedex, nitroglycerin  Fluids: PRN  Oxygen: vent  Nutrition: NPO  Restraints: bilateral wrists  Code status: full  Dispo: heart center          I spent 45 minutes in the professional and overall care of this patient.      Lucy Sandoval DO

## 2024-08-24 NOTE — NURSING NOTE
Notified Dr. Sandoval of Right pleural chest tube tidaling that was synchronized with the patient's heart beat around 2300. Patient also has a very strong friction rub upon auscultation. Dr. Zhao was also notified. Will continue to monitor.

## 2024-08-24 NOTE — PROGRESS NOTES
Occupational Therapy    Evaluation    Patient Name: Montana Kern  MRN: [id number]  Today's Date: 8/24/2024  Time Calculation  Start Time: 1058  Stop Time: 1120  Time Calculation (min): 22 min    Assessment  IP OT Assessment  OT Assessment: Patient requires assist all ADLs secondary to impaired activity tolerance and impaired flexibility s/p surgery; patient would benefit from O.T. services at a low intensity to improve independence with ADLs, transfers & mobility.  Prognosis: Good  End of Session Communication: Bedside nurse  End of Session Patient Position: Up in chair, Alarm off, not on at start of session (call light in reach)    Plan:  Treatment Interventions: ADL retraining, Functional transfer training, Endurance training, Neuromuscular reeducation, Compensatory technique education  OT Frequency: 3 times per week  OT Discharge Recommendations: Low intensity level of continued care  OT - OK to Discharge: Yes (from an O.T. standpoint)    Subjective     Current Problem:  Patient Active Problem List   Diagnosis    Type 2 diabetes mellitus treated without insulin (Multi)    Essential hypertension, benign    Dyslipidemia    Acute non-recurrent maxillary sinusitis    Pain in joint involving right ankle and foot    Low back pain    Exertional chest pain    BMI 30.0-30.9,adult    Never smoked cigarettes    Abnormal stress test    Angina, class III (CMS-HCC)    Angina pectoris, unstable (Multi)    Coronary artery disease involving native coronary artery of native heart    CAD in native artery       General:  General  Reason for Referral: OT eval & treat s/p recent surgery (8/23/24: CABG x 3, left atrial appendage ligation)  Referred By: Sharri Vallejo  Past Medical History Relevant to Rehab: HTN, HLD, DM, CAD, EtOH use, OA of ankles  Prior to Session Communication: Bedside nurse  Patient Position Received: Up in chair, Alarm off, not on at start of session  General Comment: Per conference with RN, patient is medically  stable for therapy eval    Precautions:  Medical Precautions: Cardiac precautions  Post-Surgical Precautions:  (MITT)  Precautions Comment: 3 chest tubes, 2 L O2, quinteros catheter, nitro drip, central line    Vital Signs:  Heart Rate:  (vitals stable throughout eval)    Pain:  Pain Assessment  Pain Assessment: 0-10  0-10 (Numeric) Pain Score: 5 - Moderate pain (2/10 at rest, increased with movement)  Pain Type: Surgical pain  Pain Location: Chest  Pain Interventions: Repositioned    Objective     Cognition:  Overall Cognitive Status: Within Functional Limits        Home Living:  Home Living Comments: Pt lives at home with wife & 3 children (2 adult children and 1 minor). House with 3 ABDOULAYE with rail, 12 steps up to 3 bedrooms and full bathroom, then 12 more steps up to patient's bedroom. Pt plans to sleep in recliner on first floor. Tub shower with HHS, high toilet. Independent ADLs, transfers & mobility without device; drives, works, shared IADLs with wife      ADL:  Grooming Assistance: Independent  UE Dressing Assistance: Minimal  LE Dressing Assistance: Maximal  ADL Comments: patient may benefit from adaptive equipment for LE ADLs    Activity Tolerance:  Endurance:  (deconditioned s/p surgery)  Early Mobility/Exercise Safety Screen: Proceed with mobilization - No exclusion criteria met    Bed Mobility/Transfers:      Transfers  Transfer:  (SBA sit to stand from chair)    Ambulation/Gait Training:  Functional Mobility  Functional Mobility Performed:  (CGA with judith walker)    Sitting Balance:  Dynamic Sitting Balance  Dynamic Sitting-Level of Assistance: Distant supervision    Standing Balance:  Dynamic Standing Balance  Dynamic Standing-Level of Assistance: Close supervision    Sensation:  Light Touch: No apparent deficits    Strength:  Strength Comments: not formally tested s/p surgery; grossly WFL elbow-wrist-hand    Coordination:  Movements are Fluid and Coordinated: Yes           Outcome Measures: [facility] Daily  Activity  Putting on and taking off regular lower body clothing: A lot  Bathing (including washing, rinsing, drying): A little  Putting on and taking off regular upper body clothing: A little  Toileting, which includes using toilet, bedpan or urinal: A lot  Taking care of personal grooming such as brushing teeth: None  Eating Meals: None  Daily Activity - Total Score: 18        ICU Mobility Screen  Early Mobility/Exercise Safety Screen: Proceed with mobilization - No exclusion criteria met  ICU Mobility Scale: Walking with assistance of 1 person  E = Exercise and Early Mobility  Early Mobility/Exercise Safety Screen: Proceed with mobilization - No exclusion criteria met  ICU Mobility Scale: Walking with assistance of 1 person        EDUCATION:     Education Documentation  Precautions, taught by Victoria Velasquez OT at 8/24/2024  2:57 PM.  Learner: Patient  Readiness: Acceptance  Method: Explanation  Response: Verbalizes Understanding    ADL Training, taught by Victoria Velasquez OT at 8/24/2024  2:57 PM.  Learner: Patient  Readiness: Acceptance  Method: Explanation  Response: Verbalizes Understanding    Education Comments  No comments found.        Goals:   Encounter Problems       Encounter Problems (Active)       OT Goals       Increase LE bathing & dressing to supervision with adaptive equipment as needed.  (Progressing)       Start:  08/24/24    Expected End:  09/07/24            Increase functional transfers & mobility to/from bed, chair & commode to supervision with DME for safety  (Progressing)       Start:  08/24/24    Expected End:  09/07/24            Demonstrate compliance to post op precautions, energy conservation techs and safety techs during ADLs  (Progressing)       Start:  08/24/24    Expected End:  09/07/24            Increase dynamic stand balance to supervision with UE support to promote increased independence with ADL & functional transfers  (Progressing)       Start:  08/24/24    Expected End:  09/07/24

## 2024-08-24 NOTE — PROGRESS NOTES
Physical Therapy    Physical Therapy Evaluation    Patient Name: Montana Kern  MRN: [id number]  [id number]  Today's Date: 8/24/2024   Time Calculation  Start Time: 1057  Stop Time: 1120  Time Calculation (min): 23 min    Assessment/Plan   PT Assessment  PT Assessment Results: Decreased strength, Decreased endurance, Decreased mobility  Rehab Prognosis: Good  Evaluation/Treatment Tolerance: Patient tolerated treatment well  End of Session Communication: Bedside nurse  End of Session Patient Position: Up in chair, Alarm off, not on at start of session  IP OR SWING BED PT PLAN  Inpatient or Swing Bed: Inpatient  PT Plan  Treatment/Interventions: Bed mobility, Transfer training, Gait training, Stair training, Strengthening, Endurance training, Therapeutic exercise, Therapeutic activity  PT Plan: Ongoing PT  PT Frequency: 4 times per week  PT Discharge Recommendations: Low intensity level of continued care  PT - OK to Discharge: Yes (when cleared by medical team)    Subjective     Current Problem:  Patient Active Problem List   Diagnosis    Type 2 diabetes mellitus treated without insulin (Multi)    Essential hypertension, benign    Dyslipidemia    Acute non-recurrent maxillary sinusitis    Pain in joint involving right ankle and foot    Low back pain    Exertional chest pain    BMI 30.0-30.9,adult    Never smoked cigarettes    Abnormal stress test    Angina, class III (CMS-HCC)    Angina pectoris, unstable (Multi)    Coronary artery disease involving native coronary artery of native heart    CAD in native artery       General Visit Information:  General  Reason for Referral: PT eval and treat: s/p CABG x 3 on 8/23/24, left atrial appendage ligation  Referred By: Sharri Vallejo  Past Medical History Relevant to Rehab: HTN, HLD, DM, CAD, EtOH use, OA of ankles  Prior to Session Communication: Bedside nurse  Patient Position Received: Up in chair, Alarm off, not on at start of session  General Comment: Pt sitting in chair  upon entering, agreeable to PT. Wife and mother present end of session.    Home Living:  Home Living  Home Living Comments: Pt lives at home with wife and 3 children (2 adult children and 1 minor). House with 3 ABDOULAYE with rail, 12 steps up to 3 bedrooms and full bathroom, then 12 more steps up to patient's bedroom. Pt plans to sleep in recliner on first floor. Tub shower with HHS.    Prior Level of Function:  Prior Function Per Pt/Caregiver Report  Prior Function Comments: IND ambulation without device, IND ADLs, IND IADLs, (+) working full time and also a part time job, (+) driving    Precautions:  Precautions  Medical Precautions: Cardiac precautions  Post-Surgical Precautions: Move in the Tube  Precautions Comment: telemetry, 2L O2, 3 chest tubes, quinteros, central line, nitro drip    Vital Signs:  Vital Signs  Heart Rate:  (all VSS throughout session)  Objective     Pain:  Pain Assessment  Pain Assessment:  (2.10 incisional pain resting, 5/10 pain with mobility)    Cognition:  Cognition  Overall Cognitive Status: Within Functional Limits    General Assessments:      Activity Tolerance  Early Mobility/Exercise Safety Screen: Proceed with mobilization - No exclusion criteria met  Sensation  Light Touch: No apparent deficits  Strength  Strength Comments:  (BLE WFL)    Functional Assessments:     Bed Mobility  Bed Mobility:  (supine to sit not assessed as pt is up in chair beginning and end of session)  Transfers  Transfer:  (sit to stand with SBA with cues for hand placement, safety and MITT precautions)  Ambulation/Gait Training  Ambulation/Gait Training Performed:  (Pt ambulates 100ft with nezzie walker with CGA with no acute LOB, decreased annalise. Cues for walker and line management)      Outcome Measures:  [facility] Basic Mobility  Turning from your back to your side while in a flat bed without using bedrails: A little  Moving from lying on your back to sitting on the side of a flat bed without using bedrails: A  lot  Moving to and from bed to chair (including a wheelchair): A little  Standing up from a chair using your arms (e.g. wheelchair or bedside chair): A little  To walk in hospital room: A little  Climbing 3-5 steps with railing: Total  Basic Mobility - Total Score: 15           FSS-ICU  Ambulation: Walks >/ or equal to 50 feet with any assistance x1  Rolling: Minimal assistance (performs 75% or more of task)  Sitting: Minimal assistance (performs 75% or more of task)  Transfer Sit-to-Stand: Minimal assistance (performs 75% or more of task)  Transfer Supine-to-Sit: Moderate assistance (performs 50 - 74% of task)  Total Score: 17  ICU Mobility Screen  Early Mobility/Exercise Safety Screen: Proceed with mobilization - No exclusion criteria met  ICU Mobility Scale: Walking with assistance of 1 person  E = Exercise and Early Mobility  Early Mobility/Exercise Safety Screen: Proceed with mobilization - No exclusion criteria met  ICU Mobility Scale: Walking with assistance of 1 person          Goals:  Encounter Problems       Encounter Problems (Active)       PT Problem       STG - Pt will amb >150' using no device with IND  (Progressing)       Start:  08/24/24    Expected End:  09/07/24            STG - Pt will transition supine <> sitting with MOD IND  (Progressing)       Start:  08/24/24    Expected End:  09/07/24            STG - Pt will transfer STS with IND no device (Progressing)       Start:  08/24/24    Expected End:  09/07/24            STG -  Pt will navigate 10 stairs using rail with supervision  (Progressing)       Start:  08/24/24    Expected End:  09/07/24                 Education Documentation  Precautions, taught by Neha Keating PT at 8/24/2024  2:04 PM.  Learner: Patient  Readiness: Acceptance  Method: Explanation  Response: Verbalizes Understanding    Mobility Training, taught by Neha Keating PT at 8/24/2024  2:04 PM.  Learner: Patient  Readiness: Acceptance  Method: Explanation  Response:  Verbalizes Understanding    Education Comments  No comments found.

## 2024-08-24 NOTE — PROGRESS NOTES
Subjective   Montana Kern is a 44 y.o. male who presents with No chief complaint on file..    Patient doing well. Pain well controlled. Remains on 2L NC at this time. CABG x3 with 300 ml output per chest tube to date. James with minimal output. Remains on nitroglycerin ggt, continue to wean.    Objective   Vitals:    08/24/24 0800   BP: 95/57   Pulse: 85   Resp: 26   Temp:    SpO2: 96%      Physical Exam  Physical Exam:  Constitutional: obese, awake, alert, no acute distress  ENMT: mucous membranes moist, EOMI, conjunctivae clear  Head/Neck: normocephalic, atraumatic; supple, trachea midline  Respiratory/Thorax: patent airways, incision down sternum   Cardiovascular: RRR, no murmur  Gastrointestinal: soft, nondistended, non-tender, bowel sounds appreciated  Extremities: palpable peripheral pulses, no edema or cyanosis  Neurological: AO x3, no focal deficits  Psychological: appropriate mood and behavior  Skin: warm and dry    Assessment/Plan       Montana Kern is a 44 y.o. male never smoker with history of obesity, DM2, DLD, HTN, nephrolithiasis, ?asthma and multi-vessel CAD presenting status post 3V CABG (LIMA to LAD, SVG to PDA, and RA to OM), LAAL and posterior pericardial window. Critical care consulted for post op ICU management.     Neuro:  - Patient orientated to person place and time  - Sedation: none  - Analgesia: PRN Tylenol, Oxycodone, Fentanyl per CTS  - Maintain normal sleep/wake cycle  - Avoid oversedating patient    Cardiovascular  - Last TTE 7/26/24 showed EF 55-60%  - Patient with postop hypotension after CABG surgery, expected  - Pressors: nitroglycerin ggt. Maintain MAP >70, wean as tolerated.  - On ASA and statin. Resume BB as tolerated.  - Diuresis per CTS. Keep Mg>2, K>4.    Pulmonary:  - Patient with postop respiratory insufficiency, improving   - Patient extubated on 8/23/24. Wean as tolerated, maintain O2 sats>92%.  - Chest tube management per CTS  - CXR shows post CABG changes, central  lines, clips. Atelectasis.  - Encourage incentive spirometer use q1  - Daily CXR    GI:  - Diet: NPO advance per CTS  - Prophylaxis: PPI  - No expected issues    Renal:   - No current issues  - Quinteros in place, strict I&O's  - Trend renal panel, replete electrolytes PRN    Endocrine:  -T2DM   -SSI, maintain BG between 140-180s    Heme/Onc:  - DVT PPX per CTS  - Postop anemia as expected  - Trend H&H    ID:  - Prophylactic antibiotics per CTS  - CXR no acute pulmonary process    Skin/MSK:  - No acute issues    ICU CHECK LIST:   Antimicrobials: ancef  Oxygen: 2L NC  Feeding: cardiac diet  Fluids: none  Analgesia: tylenol, gabapentin, dilaudid PRN  Sedation: none  Thromboprophylaxis:   Ulcer prophylaxis: PPI  Glycemic control: SSI  Bowel care: chris-colase  Indwelling catheters: quinteros 8/23  Lines: chest tubes x3 (8/23), ETT 8/23, Rij CVC 8/23     Code Status: FULL    This is a preliminary note written by the resident. Please wait for attending addendum for finalization of note and recommendations.    Zander Corley DO, PhD  Internal Medicine PGY2

## 2024-08-24 NOTE — PROGRESS NOTES
08/24/24 1112   Discharge Planning   Living Arrangements Spouse/significant other;Children   Support Systems Spouse/significant other;Children;Parent;Friends/neighbors   Assistance Needed Independent and driving PTA   Type of Residence Private residence   Number of Stairs to Enter Residence 3   Number of Stairs Within Residence 30   Do you have animals or pets at home? Yes   Type of Animals or Pets 2 dogs   Home or Post Acute Services In home services  (pending therapy evals)   Type of Home Care Services Home OT;Home PT  (pending therapy evals)   Expected Discharge Disposition  Services   Does the patient need discharge transport arranged? No   Financial Resource Strain   How hard is it for you to pay for the very basics like food, housing, medical care, and heating? Not hard   Housing Stability   In the last 12 months, was there a time when you were not able to pay the mortgage or rent on time? N   In the past 12 months, how many times have you moved where you were living? 0   At any time in the past 12 months, were you homeless or living in a shelter (including now)? N   Transportation Needs   In the past 12 months, has lack of transportation kept you from medical appointments or from getting medications? no   In the past 12 months, has lack of transportation kept you from meetings, work, or from getting things needed for daily living? No     This TCC met with patient at bedside, introduced self and explained role.  Demographic information and insurance verified.  Patient is from home with spouse and children, independent PTA.  Checks blood sugar at least 1x/day.  Denies SW needs at this time.  Patient plans to return home at discharge, agreeable to [facility]. Disposition pending therapy evals.  Patient's spouse is able to provide transportation home.  Care Transitions will continue to follow.

## 2024-08-24 NOTE — PROGRESS NOTES
Cardiac Surgery  Progress Note     Montana Kern is a 44 y.o. male on day 1 of admission presenting with Coronary artery disease involving native coronary artery of native heart.    Subjective     Interval HPI: Seen and assessed patient this AM while they were sitting up in bedside chair; in no acute distress. Endorsing mild chest discomfort.     Review of Systems   Constitutional: Negative for decreased appetite and malaise/fatigue.   HENT:  Negative for congestion.    Eyes:  Negative for blurred vision and double vision.   Cardiovascular:  Positive for dyspnea on exertion. Negative for chest pain, irregular heartbeat, leg swelling, orthopnea and palpitations.   Respiratory:  Positive for cough. Negative for shortness of breath.    Endocrine: Negative for cold intolerance and heat intolerance.   Skin:  Negative for nail changes, poor wound healing and rash.   Musculoskeletal:  Negative for arthritis, muscle cramps, muscle weakness, myalgias and stiffness.   Gastrointestinal:  Negative for bloating, nausea and vomiting.   Genitourinary:  Negative for frequency, hesitancy and urgency.   Neurological:  Positive for weakness. Negative for dizziness, focal weakness and light-headedness.   Psychiatric/Behavioral:  Negative for altered mental status.    Allergic/Immunologic: Negative for environmental allergies.         Objective   Physical Exam  Physical Exam  Vitals and nursing note reviewed.   Constitutional:       General: He is awake. He is not in acute distress.     Appearance: Normal appearance. He is not ill-appearing or diaphoretic.      Interventions: Nasal cannula in place.   HENT:      Head: Normocephalic and atraumatic.      Nose: Nose normal.      Mouth/Throat:      Mouth: Mucous membranes are moist.      Pharynx: Oropharynx is clear. No oropharyngeal exudate.   Eyes:      Extraocular Movements: Extraocular movements intact.      Conjunctiva/sclera: Conjunctivae normal.      Pupils: Pupils are equal, round,  and reactive to light.   Neck:      Comments: Right internal jugular triple lumen catheter    Cardiovascular:      Rate and Rhythm: Normal rate and regular rhythm.      Pulses: Normal pulses.      Heart sounds: Normal heart sounds, S1 normal and S2 normal. No murmur heard.     No systolic murmur is present.      No friction rub. No gallop.   Pulmonary:      Effort: Pulmonary effort is normal. No tachypnea or bradypnea.      Breath sounds: Normal breath sounds.   Chest:      Chest wall: Tenderness present.      Comments: Mediastinal, R/L Pleural Chest Tubes   Abdominal:      General: Abdomen is flat. Bowel sounds are normal. There is no distension.      Palpations: Abdomen is soft.   Musculoskeletal:         General: Normal range of motion.      Cervical back: Normal range of motion and neck supple. No edema.      Right lower leg: No edema.      Left lower leg: No edema.   Skin:     General: Skin is warm and dry.      Capillary Refill: Capillary refill takes less than 2 seconds.      Findings: No lesion.      Nails: There is no clubbing.   Neurological:      General: No focal deficit present.      Mental Status: He is alert and oriented to person, place, and time. Mental status is at baseline.      GCS: GCS eye subscore is 1. GCS verbal subscore is 1. GCS motor subscore is 1.      Cranial Nerves: Cranial nerves 2-12 are intact.      Sensory: Sensation is intact.      Motor: Motor function is intact.   Psychiatric:         Attention and Perception: Attention and perception normal.         Mood and Affect: Mood normal.         Speech: Speech normal.         Behavior: Behavior normal. Behavior is cooperative.         Thought Content: Thought content normal.         Cognition and Memory: Cognition and memory normal.         Judgment: Judgment normal.         Last Recorded Vitals  Vitals:    08/24/24 1200 08/24/24 1300 08/24/24 1400 08/24/24 1500   BP: 107/66 107/69 111/59 112/64   Pulse: 85 90 82 81   Resp: 22 (!) 30 (!)  32 (!) 30   Temp: 36.8 °C (98.2 °F)      TempSrc: Temporal      SpO2: 98% 97% 96% 94%   Weight:       Height:            Intake/Output last 3 Shifts:  I/O last 3 completed shifts:  In: 5985.6 (58.8 mL/kg) [I.V.:3399.6 (33.4 mL/kg); Blood:623; IV Piggyback:1920]  Out: 2968 (29.2 mL/kg) [Urine:1568 (0.4 mL/kg/hr); Blood:600; Chest Tube:800]  Weight: 101.8 kg     Inpatient Medications  acetaminophen, 975 mg, oral, q8h  aspirin, 81 mg, oral, Daily  atorvastatin, 80 mg, oral, Nightly  ceFAZolin, 2 g, intravenous, q8h  folic acid, 1 mg, oral, Daily  gabapentin, 100 mg, oral, TID  insulin lispro, 0-15 Units, subcutaneous, q4h  metoprolol tartrate, 12.5 mg, oral, BID  multivitamin with minerals, 1 tablet, oral, Daily  pantoprazole, 40 mg, oral, Daily before breakfast   Or  pantoprazole, 40 mg, intravenous, Daily before breakfast  polyethylene glycol, 17 g, oral, Daily  sennosides-docusate sodium, 2 tablet, oral, BID  [START ON 8/26/2024] thiamine, 100 mg, oral, Daily  thiamine, 100 mg, intravenous, Daily      Continuous medications  lactated Ringer's, 30 mL/hr, Last Rate: Stopped (08/24/24 0500)  lactated Ringer's, 5 mL/hr, Last Rate: 5 mL/hr (08/24/24 0500)  nitroglycerin, 5-200 mcg/min, Last Rate: Stopped (08/24/24 1500)      PRN medications  PRN medications: alteplase, calcium gluconate, calcium gluconate, dextrose **OR** glucagon, HYDROmorphone, magnesium sulfate, magnesium sulfate, naloxone, ondansetron ODT **OR** ondansetron, oxyCODONE, oxyCODONE, potassium chloride, potassium chloride     LDA:  CVC 08/23/24 Triple lumen Right Internal jugular (Active)   Placement Date/Time: 08/23/24 (c) 8095   Hand Hygiene Performed Prior to CVC Insertion: Yes  Site Prep: Chlorhexidine   Site Prep Agent has Completely Dried Before Insertion: Yes  All 5 Sterile Barriers Used (Gloves, Gown, Cap, Mask, Large Sterile Jossy...   Number of days: 1       Urethral Catheter Temperature probe;Straight-tip 16 Fr. (Active)   Placement Date/Time:  08/23/24 1240   Placed by: IMELDA KESSLER  Hand Hygiene Completed: Yes  Catheter Type: Temperature probe;Straight-tip  Tube Size (Fr.): 16 Fr.  Catheter Balloon Size: 10 mL  Urine Returned: Yes   Number of days: 1       Chest Tube 1 Left Pleural 28 Fr (Active)   Placement Date/Time: 08/23/24 1757   Placed by: DR. MCCOY  Hand Hygiene Completed: Yes  Tube Number: 1  Chest Tube Orientation: Left  Chest Tube Location: Pleural  Chest Tube Drain Tube Size (Fr): 28 Fr  Chest Tube Drainage System: (c) Dry seal ches...   Number of days: 0       Chest Tube 2 Mediastinal 28 Fr (Active)   Placement Date/Time: 08/23/24 1759   Placed by: DR. MCCOY  Hand Hygiene Completed: Yes  Tube Number: 2  Chest Tube Location: Mediastinal  Chest Tube Drain Tube Size (Fr): 28 Fr  Chest Tube Drainage System: (c) Dry seal chest drain   Number of days: 0       Chest Tube 3 Right Pleural 28 Fr (Active)   Placement Date/Time: 08/23/24 1759   Placed by: DR. MCCOY  Hand Hygiene Completed: Yes  Tube Number: 3  Chest Tube Orientation: Right  Chest Tube Location: Pleural  Chest Tube Drain Tube Size (Fr): 28 Fr  Chest Tube Drainage System: (c) Dry seal alice...   Number of days: 0       Relevant Results  Lab Review  Results from last 7 days   Lab Units 08/24/24  0435   WBC AUTO x10*3/uL 7.6   HEMOGLOBIN g/dL 10.6*   HEMATOCRIT % 31.8*   PLATELETS AUTO x10*3/uL 130*     Results from last 7 days   Lab Units 08/24/24  0435   SODIUM mmol/L 135*   POTASSIUM mmol/L 3.9   CHLORIDE mmol/L 105   CO2 mmol/L 24   BUN mg/dL 11   CREATININE mg/dL 0.62   CALCIUM mg/dL 7.8*   GLUCOSE mg/dL 181*     Results from last 7 days   Lab Units 08/24/24  0435   MAGNESIUM mg/dL 1.70     Results from last 7 days   Lab Units 08/23/24  2112   POCT PH, ARTERIAL pH 7.34*   POCT PCO2, ARTERIAL mm Hg 43*   POCT PO2, ARTERIAL mm Hg 118*   POCT HCO3 CALCULATED, ARTERIAL mmol/L 23.2   POCT BASE EXCESS, ARTERIAL mmol/L -2.6*         Cardiology Tests       Echo:  Transthoracic Echo (TTE)  Complete 07/26/2024  PHYSICIAN INTERPRETATION:  Left Ventricle: Left ventricular ejection fraction is normal, by visual estimate at 55-60%. Wall motion is abnormal. The left ventricular cavity size is normal. Spectral Doppler shows a normal pattern of left ventricular diastolic filling. The apex is hypokinetic particularly the septal aspect. Inferior aspect of the apex slightly hypokinetic as well.  Left Atrium: The left atrium is mild to moderately dilated.  Right Ventricle: The right ventricle is normal in size. There is normal right ventricular global systolic function.  Right Atrium: The right atrium is normal in size.  Aortic Valve: The aortic valve is trileaflet. The aortic valve dimensionless index is 0.90. There is no evidence of aortic valve regurgitation. The peak instantaneous gradient of the aortic valve is 5.3 mmHg. The mean gradient of the aortic valve is 3.0 mmHg.  Mitral Valve: The mitral valve is normal in structure. There is trace mitral valve regurgitation.  Tricuspid Valve: The tricuspid valve is structurally normal. There is trace to mild tricuspid regurgitation.  Pulmonic Valve: The pulmonic valve is structurally normal. There is trace pulmonic valve regurgitation.  Pericardium: There is no pericardial effusion noted.  Aorta: The aortic root is abnormal. There is mild dilatation of the ascending aorta. There is mild dilatation of the aortic root. Mild aortic root dilatation at 4 cm mild ascending aorta dilatation at 4.1 cm.        CONCLUSIONS:   1. Left ventricular ejection fraction is normal, by visual estimate at 55-60%.   2. The apex is hypokinetic particularly the septal aspect. Inferior aspect of the apex slightly hypokinetic as well.   3. There is normal right ventricular global systolic function.   4. The left atrium is mild to moderately dilated.   5. Mild aortic root dilatation at 4 cm mild ascending aorta dilatation at 4.1 cm.   6. Normal valve function.    Ejection Fractions:  EF    Date/Time Value Ref Range Status   07/26/2024 01:49 PM 58 %      Cath:  Cardiac Catheterization Procedure 07/26/2024  Coronary Angiography:  The coronary circulation is right dominant.     Left Main Coronary Artery:  The left main coronary artery is free of atherosclerotic disease.     Left Anterior Descending Coronary Artery Distribution:  The Left Anterior Descending artery is a large vessel. There is 90% stenosis in the ostial left anterior descending artery. Hemodynamically significant obstruction is noted in this vessel. There is 100% stenosis in the mid left anterior descending artery.     Circumflex Coronary Artery Distribution:  The Left Circumflex artery is a medium-sized vessel. Hemodynamically significant obstruction is noted in this vessel. There is 90% stenosis in the the ostial Circumflex artery.     Right Coronary Artery Distribution:     The Right Coronary Artery is a large vessel. Hemodynamically significant obstruction is noted in this vessel. There is 75% stenosis in the the proximal Right Coronary Artery. The distal right coronary artery showed 90% stenosis. Posterior Lateral Branch right coronary artery showed 90% stenosis.        Left Ventriculography:  The estimated left ventricular ejection fraction is 50%.  Stress Test:  Nuclear Stress Test 07/11/2024  IMPRESSION:  Suspicion of a prior infarct along the distal anterior and distal  septal wall extending into the apex with evidence of moderate  chris-infarct ischemia along the distal septal and distal anterior  regions.      Left ventricle is enlarged in size      Normal wall motion with post-stress LVEF of 46%             History of Present Illness: Montana eKrn is a 44 year old male with PMH HTN, HLD, and NIDDM. Now s/p CABGx3 (LIMA-LAD, SVG-PDA, Radial-OM), LAAL, Ligation Ligament of zahra, and pericardial window with Dr. Zhao on 8/23.     Daily Events    8/24/24: No acute events overnight; patient extubated in the immediate  post-op period. No vasopressors, transitioning IV nitrate to PO. Maintaining chest tubes today. Starting on low dose beta blockade.     - Current O2 Requirements: 2L NC      Assessment & Plan       Multivessel Coronary Artery Disease  - S/p CABG x 3 on 8/24/24 with Dr. Michael Zhao (LIMA-LAD, RA-OM, SVG-rPDA)  - Continue on ASA 81mg and Atorvastatin 80mg  - Holding beta-blocker in initial post op period  - Maintain MS/pl chest tubes to -20cm H20 continuous suction   - Daily CXR while chest tube intact        Radial Grafting  - S/p radial harvest site  - IV nitrate to PO        Mediastinal Incision  - Remove post-op dressing on POD #2  - Dressing C/D/I       Acute Post-Op Pain  - As expected   - Multimodal pain control with scheduled magnesium oxide 400mg QD, gabapentin 100mg TID, and acetaminophen 650 q6h (PRN oxycodone & fentanyl)  - Bowel regimen while taking narcotics        Risk for Post-Op Arrhythmia  - Ventricular wires placed  - Discontinue V-wires prior to DC  - Telemetry until discharged       Acute Postoperative Respiratory Insufficiency   - As expected following CABG with post-op atelectasis  - Current O2 Requirements: 2L NC   - Aggressive coughing and deep breathing exercises.   - Incentive spirometry  - Out of bed, early and aggressive mobilization with assistance  - Oxygen as needed, wean to keep saturation above 92%  - ICU intensivist/pulmonologist consulted  - Albuterol nebulizers as needed       Risk for Fluid Volume Overload  - Pre-Op Weight: 96 kg     8/24: 102 kg (net positive 2717mL)   - Strict I& Os and daily weights    - Monitor CVP and hemodynamics closely       Acute Post-Op Blood-Loss Anemia  - Pre-Op H/H: 13.3/40.6     8/24: 10.6/31.8  - Monitor h/h in the initial post op period  - Monitor chest tubes for post op hemorrhage   - Multivitamin/iron tablet   - Daily CBC while in hospital       Post-Op Leukocytosis  - Pre-Op WBC: 5.5     8/24: 7.6  - Afebrile, consider elevations as reactive to  surgery   - Post-op ATB Q12 for 48hrs  - Daily cbc while in hospital        Essential HTN  - Home Medications: Metoprolol succinate 50mg every day, lisinopril 40mg  - Restarting BB as above, hold ACE-I        Type 2 DM  - Home Medications: metformin 1g BID, glimepiride 4mg, semaglutide 1mg   - HbA1c: 6.9  - Goal for BG <150 in the post-operative period  - SSI        Dyslipidemia  - Home Medications: Atoravastatin 80mg   - Continue on statin therapy as above     Risk for Electrolyte Disturbances  - Optimize electrolytes per [facility] protocol      Bowel Regimen  - Senna-S BID   - PRN suppositories and miralax    Prophylaxis  - GI: PPI  - DVT: Julio-Hose & starting enoxaparin on POD #1  - MRSA: Negative (8/6)   - PT/OT eval-once clinically stable     Disposition  - CVICU (stepdown)     Patient  plan discussed with Dr. Michael Zhao.      Dexter Sommer, APRN-CNP

## 2024-08-24 NOTE — ANESTHESIA POSTPROCEDURE EVALUATION
Patient: Montana Kern    Procedure Summary       Date: 08/23/24 Room / Location: [facility] OR 10 / [facility] PAR OR    Anesthesia Start: 1200 Anesthesia Stop: 2007    Procedure: CORONARY ARTERY BYPASS GRAFT x3/ LEFT INTERNAL MAMMARY ARTERY/ RADIAL & VEIN/ LEFT ATRIAL APPENDAGE LIGATION (Chest) Diagnosis:       Coronary artery disease involving native coronary artery of native heart, unspecified whether angina present      (Coronary artery disease involving native coronary artery of native heart, unspecified whether angina present [I25.10])    Surgeons: Bernabe Zhao MD Responsible Provider: Cassius Cabral MD    Anesthesia Type: general ASA Status: 4            Anesthesia Type: general    Vitals Value Taken Time   /55 08/23/24 1955   Temp 36.3 °C (97.3 °F) 08/23/24 1955   Pulse 92 08/23/24 2006   Resp 19 08/23/24 2006   SpO2 100 % 08/23/24 2006   Vitals shown include unfiled device data.    Anesthesia Post Evaluation    Patient location during evaluation: ICU  Patient participation: complete - patient cannot participate  Level of consciousness: sedated  Pain score: 0  Pain management: adequate  Airway patency: patent  Cardiovascular status: acceptable, blood pressure returned to baseline and hemodynamically stable  Respiratory status: acceptable, ETT, intubated and ventilator  Hydration status: acceptable  Postoperative Nausea and Vomiting: none        There were no known notable events for this encounter.

## 2024-08-25 ENCOUNTER — APPOINTMENT (OUTPATIENT)
Dept: CARDIOLOGY | Facility: HOSPITAL | Age: 44
DRG: 236 | End: 2024-08-25
Payer: COMMERCIAL

## 2024-08-25 ENCOUNTER — APPOINTMENT (OUTPATIENT)
Dept: RADIOLOGY | Facility: HOSPITAL | Age: 44
DRG: 236 | End: 2024-08-25
Payer: COMMERCIAL

## 2024-08-25 LAB
ALBUMIN SERPL BCP-MCNC: 3.4 G/DL (ref 3.4–5)
ANION GAP SERPL CALC-SCNC: 10 MMOL/L (ref 10–20)
BUN SERPL-MCNC: 11 MG/DL (ref 6–23)
CALCIUM SERPL-MCNC: 8.4 MG/DL (ref 8.6–10.3)
CHLORIDE SERPL-SCNC: 102 MMOL/L (ref 98–107)
CO2 SERPL-SCNC: 28 MMOL/L (ref 21–32)
CREAT SERPL-MCNC: 0.79 MG/DL (ref 0.5–1.3)
EGFRCR SERPLBLD CKD-EPI 2021: >90 ML/MIN/1.73M*2
ERYTHROCYTE [DISTWIDTH] IN BLOOD BY AUTOMATED COUNT: 12.6 % (ref 11.5–14.5)
GLUCOSE BLD MANUAL STRIP-MCNC: 136 MG/DL (ref 74–99)
GLUCOSE BLD MANUAL STRIP-MCNC: 191 MG/DL (ref 74–99)
GLUCOSE BLD MANUAL STRIP-MCNC: 191 MG/DL (ref 74–99)
GLUCOSE BLD MANUAL STRIP-MCNC: 244 MG/DL (ref 74–99)
GLUCOSE BLD MANUAL STRIP-MCNC: 265 MG/DL (ref 74–99)
GLUCOSE BLD MANUAL STRIP-MCNC: 272 MG/DL (ref 74–99)
GLUCOSE BLD MANUAL STRIP-MCNC: 283 MG/DL (ref 74–99)
GLUCOSE SERPL-MCNC: 139 MG/DL (ref 74–99)
HCT VFR BLD AUTO: 33 % (ref 41–52)
HGB BLD-MCNC: 10.8 G/DL (ref 13.5–17.5)
MAGNESIUM SERPL-MCNC: 1.87 MG/DL (ref 1.6–2.4)
MCH RBC QN AUTO: 27.1 PG (ref 26–34)
MCHC RBC AUTO-ENTMCNC: 32.7 G/DL (ref 32–36)
MCV RBC AUTO: 83 FL (ref 80–100)
NRBC BLD-RTO: 0 /100 WBCS (ref 0–0)
PHOSPHATE SERPL-MCNC: 2.5 MG/DL (ref 2.5–4.9)
PLATELET # BLD AUTO: 147 X10*3/UL (ref 150–450)
POTASSIUM SERPL-SCNC: 3.6 MMOL/L (ref 3.5–5.3)
RBC # BLD AUTO: 3.98 X10*6/UL (ref 4.5–5.9)
SODIUM SERPL-SCNC: 136 MMOL/L (ref 136–145)
WBC # BLD AUTO: 10.1 X10*3/UL (ref 4.4–11.3)

## 2024-08-25 PROCEDURE — 2500000004 HC RX 250 GENERAL PHARMACY W/ HCPCS (ALT 636 FOR OP/ED)

## 2024-08-25 PROCEDURE — 71045 X-RAY EXAM CHEST 1 VIEW: CPT

## 2024-08-25 PROCEDURE — 2500000002 HC RX 250 W HCPCS SELF ADMINISTERED DRUGS (ALT 637 FOR MEDICARE OP, ALT 636 FOR OP/ED)

## 2024-08-25 PROCEDURE — 85027 COMPLETE CBC AUTOMATED: CPT

## 2024-08-25 PROCEDURE — 93010 ELECTROCARDIOGRAM REPORT: CPT | Performed by: INTERNAL MEDICINE

## 2024-08-25 PROCEDURE — 2060000001 HC INTERMEDIATE ICU ROOM DAILY

## 2024-08-25 PROCEDURE — 37799 UNLISTED PX VASCULAR SURGERY: CPT

## 2024-08-25 PROCEDURE — 2500000001 HC RX 250 WO HCPCS SELF ADMINISTERED DRUGS (ALT 637 FOR MEDICARE OP): Performed by: NURSE PRACTITIONER

## 2024-08-25 PROCEDURE — 2500000004 HC RX 250 GENERAL PHARMACY W/ HCPCS (ALT 636 FOR OP/ED): Performed by: NURSE PRACTITIONER

## 2024-08-25 PROCEDURE — 2500000001 HC RX 250 WO HCPCS SELF ADMINISTERED DRUGS (ALT 637 FOR MEDICARE OP)

## 2024-08-25 PROCEDURE — 93005 ELECTROCARDIOGRAM TRACING: CPT

## 2024-08-25 PROCEDURE — 80069 RENAL FUNCTION PANEL: CPT

## 2024-08-25 PROCEDURE — 83735 ASSAY OF MAGNESIUM: CPT

## 2024-08-25 PROCEDURE — 82947 ASSAY GLUCOSE BLOOD QUANT: CPT

## 2024-08-25 PROCEDURE — 99232 SBSQ HOSP IP/OBS MODERATE 35: CPT

## 2024-08-25 PROCEDURE — 2500000002 HC RX 250 W HCPCS SELF ADMINISTERED DRUGS (ALT 637 FOR MEDICARE OP, ALT 636 FOR OP/ED): Performed by: NURSE PRACTITIONER

## 2024-08-25 PROCEDURE — 99233 SBSQ HOSP IP/OBS HIGH 50: CPT | Performed by: NURSE PRACTITIONER

## 2024-08-25 RX ORDER — FUROSEMIDE 10 MG/ML
20 INJECTION INTRAMUSCULAR; INTRAVENOUS 2 TIMES DAILY
Status: DISCONTINUED | OUTPATIENT
Start: 2024-08-25 | End: 2024-08-27 | Stop reason: HOSPADM

## 2024-08-25 RX ORDER — METOPROLOL TARTRATE 50 MG/1
50 TABLET ORAL 2 TIMES DAILY
Status: DISCONTINUED | OUTPATIENT
Start: 2024-08-26 | End: 2024-08-27 | Stop reason: HOSPADM

## 2024-08-25 RX ORDER — METOPROLOL TARTRATE 25 MG/1
25 TABLET, FILM COATED ORAL 2 TIMES DAILY
Status: DISCONTINUED | OUTPATIENT
Start: 2024-08-25 | End: 2024-08-25

## 2024-08-25 RX ORDER — METOPROLOL TARTRATE 25 MG/1
25 TABLET, FILM COATED ORAL ONCE
Status: COMPLETED | OUTPATIENT
Start: 2024-08-25 | End: 2024-08-25

## 2024-08-25 RX ORDER — INSULIN GLARGINE 100 [IU]/ML
12 INJECTION, SOLUTION SUBCUTANEOUS EVERY 24 HOURS
Status: DISCONTINUED | OUTPATIENT
Start: 2024-08-25 | End: 2024-08-27

## 2024-08-25 ASSESSMENT — PAIN DESCRIPTION - DESCRIPTORS
DESCRIPTORS: ACHING

## 2024-08-25 ASSESSMENT — LIFESTYLE VARIABLES
NAUSEA AND VOMITING: NO NAUSEA AND NO VOMITING
TOTAL SCORE: 0
PAROXYSMAL SWEATS: NO SWEAT VISIBLE
TREMOR: NO TREMOR
VISUAL DISTURBANCES: NOT PRESENT
ORIENTATION AND CLOUDING OF SENSORIUM: ORIENTED AND CAN DO SERIAL ADDITIONS
AGITATION: NORMAL ACTIVITY
TOTAL SCORE: 0
ORIENTATION AND CLOUDING OF SENSORIUM: ORIENTED AND CAN DO SERIAL ADDITIONS
PAROXYSMAL SWEATS: NO SWEAT VISIBLE
AUDITORY DISTURBANCES: NOT PRESENT
AUDITORY DISTURBANCES: NOT PRESENT
VISUAL DISTURBANCES: NOT PRESENT
NAUSEA AND VOMITING: NO NAUSEA AND NO VOMITING
AGITATION: NORMAL ACTIVITY
ANXIETY: NO ANXIETY, AT EASE
PAROXYSMAL SWEATS: NO SWEAT VISIBLE
NAUSEA AND VOMITING: NO NAUSEA AND NO VOMITING
VISUAL DISTURBANCES: NOT PRESENT
HEADACHE, FULLNESS IN HEAD: NOT PRESENT
ORIENTATION AND CLOUDING OF SENSORIUM: ORIENTED AND CAN DO SERIAL ADDITIONS
HEADACHE, FULLNESS IN HEAD: NOT PRESENT
ANXIETY: NO ANXIETY, AT EASE
TREMOR: NO TREMOR
AUDITORY DISTURBANCES: NOT PRESENT
HEADACHE, FULLNESS IN HEAD: NOT PRESENT
AGITATION: NORMAL ACTIVITY
TREMOR: NO TREMOR
ANXIETY: NO ANXIETY, AT EASE
TOTAL SCORE: 0

## 2024-08-25 ASSESSMENT — ENCOUNTER SYMPTOMS
FREQUENCY: 0
BLURRED VISION: 0
MYALGIAS: 0
BLOATING: 0
POOR WOUND HEALING: 0
DIZZINESS: 0
PALPITATIONS: 0
STIFFNESS: 0
FOCAL WEAKNESS: 0
WEAKNESS: 1
LIGHT-HEADEDNESS: 0
DOUBLE VISION: 0
DECREASED APPETITE: 0
ALTERED MENTAL STATUS: 0
DYSPNEA ON EXERTION: 1
COUGH: 1
IRREGULAR HEARTBEAT: 0
VOMITING: 0
NAUSEA: 0
NAIL CHANGES: 0
SHORTNESS OF BREATH: 0
ORTHOPNEA: 0
MUSCLE CRAMPS: 0

## 2024-08-25 ASSESSMENT — PAIN - FUNCTIONAL ASSESSMENT
PAIN_FUNCTIONAL_ASSESSMENT: 0-10

## 2024-08-25 ASSESSMENT — PAIN SCALES - GENERAL
PAINLEVEL_OUTOF10: 3
PAINLEVEL_OUTOF10: 0 - NO PAIN
PAINLEVEL_OUTOF10: 7
PAINLEVEL_OUTOF10: 2
PAINLEVEL_OUTOF10: 4
PAINLEVEL_OUTOF10: 7
PAINLEVEL_OUTOF10: 2
PAINLEVEL_OUTOF10: 7
PAINLEVEL_OUTOF10: 5 - MODERATE PAIN
PAINLEVEL_OUTOF10: 7
PAINLEVEL_OUTOF10: 0 - NO PAIN
PAINLEVEL_OUTOF10: 7
PAINLEVEL_OUTOF10: 2
PAINLEVEL_OUTOF10: 9
PAINLEVEL_OUTOF10: 3
PAINLEVEL_OUTOF10: 8

## 2024-08-25 ASSESSMENT — PAIN DESCRIPTION - LOCATION
LOCATION: BACK
LOCATION: CHEST
LOCATION: BACK

## 2024-08-25 ASSESSMENT — COGNITIVE AND FUNCTIONAL STATUS - GENERAL
DAILY ACTIVITIY SCORE: 20
DRESSING REGULAR LOWER BODY CLOTHING: A LITTLE
WALKING IN HOSPITAL ROOM: A LITTLE
CLIMB 3 TO 5 STEPS WITH RAILING: A LOT
MOVING TO AND FROM BED TO CHAIR: A LITTLE
TURNING FROM BACK TO SIDE WHILE IN FLAT BAD: A LITTLE
MOVING FROM LYING ON BACK TO SITTING ON SIDE OF FLAT BED WITH BEDRAILS: A LITTLE
STANDING UP FROM CHAIR USING ARMS: A LITTLE
MOBILITY SCORE: 17
HELP NEEDED FOR BATHING: A LITTLE
TOILETING: A LITTLE
DRESSING REGULAR UPPER BODY CLOTHING: A LITTLE

## 2024-08-25 ASSESSMENT — PAIN DESCRIPTION - ORIENTATION: ORIENTATION: MID

## 2024-08-25 NOTE — CARE PLAN
The patient's goals for the shift include      The clinical goals for the shift include pt will remain hemodynamically stable      Problem: ACS/CP/NSTEMI/STEMI  Goal: Chest pain managed (free from pain or at acceptable level)  Outcome: Progressing  Goal: Lab values return to normal range  Outcome: Progressing  Goal: Promote self management  Outcome: Progressing  Goal: Serial ECG will return to baseline  Outcome: Progressing     Problem: Hypertensive Emergency/Crisis  Goal: Blood pressure gradually reduced to goal range  Outcome: Progressing  Goal: Promote self management  Outcome: Progressing     Problem: Fall/Injury  Goal: Verbalize understanding of personal risk factors for fall in the hospital  Outcome: Progressing  Goal: Verbalize understanding of risk factor reduction measures to prevent injury from fall in the home  Outcome: Progressing  Goal: Pace activities to prevent fatigue by end of the shift  Outcome: Progressing

## 2024-08-25 NOTE — PROGRESS NOTES
Subjective   Montana Kern is a 44 y.o. male who presents with No chief complaint on file..    Patient doing well. Pain well controlled. Remains on RA at this time. CABG x3 with minimal output from chest tubes. James with minimal output. No longer on nitroglycerin ggt.    Objective   Vitals:    08/25/24 0700   BP:    Pulse: 86   Resp: (!) 27   Temp:    SpO2: 93%      Physical Exam  Physical Exam:  Constitutional: obese, awake, alert, no acute distress  ENMT: mucous membranes moist, EOMI, conjunctivae clear  Head/Neck: normocephalic, atraumatic; supple, trachea midline  Respiratory/Thorax: patent airways, incision down sternum   Cardiovascular: RRR, no murmur  Gastrointestinal: soft, nondistended, non-tender, bowel sounds appreciated  Extremities: palpable peripheral pulses, no edema or cyanosis  Neurological: AO x3, no focal deficits  Psychological: appropriate mood and behavior  Skin: warm and dry    Assessment/Plan       Montana Kern is a 44 y.o. male never smoker with history of obesity, DM2, DLD, HTN, nephrolithiasis, ?asthma and multi-vessel CAD presenting status post 3V CABG (LIMA to LAD, SVG to PDA, and RA to OM), LAAL and posterior pericardial window. Critical care consulted for post op ICU management.     Neuro:  - Patient orientated to person place and time  - Sedation: none  - Analgesia: PRNs per CTS  - Maintain normal sleep/wake cycle  - Avoid oversedating patient    Cardiovascular  - Last TTE 7/26/24 showed EF 55-60%  - On ASA and statin. Metoprolol tartrate 12.5 BID  - Diuresis per CTS. Keep Mg>2, K>4.    Pulmonary:  - Patient with postop respiratory insufficiency, improving   - Patient extubated on 8/23/24, currently on RA  - Chest tube management per CTS  - Daily CXR  - Encourage incentive spirometer use q1    GI:  - Diet: Cardiac diet  - Prophylaxis: PPI  - No expected issues    Renal:   - No current issues  - James in place, strict I&O's  - Trend renal panel, replete electrolytes  PRN    Endocrine:  -T2DM   -SSI, maintain BG between 140-180s    Heme/Onc:  - DVT PPX per CTS  - Postop anemia as expected  - Trend H&H    ID:  - Prophylactic antibiotics per CTS  - CXR no acute pulmonary process    Skin/MSK:  - No acute issues    ICU CHECK LIST:   Antimicrobials: ancef  Oxygen: RA  Feeding: cardiac diet  Fluids: none  Analgesia: tylenol, gabapentin, dilaudid PRN  Sedation: none  Thromboprophylaxis:   Ulcer prophylaxis: PPI  Glycemic control: SSI  Bowel care: chris-colase  Indwelling catheters: quinteros 8/23  Lines: chest tubes x3 (8/23), ETT 8/23, Rij CVC 8/23     Code Status: FULL    This is a preliminary note written by the resident. Please wait for attending addendum for finalization of note and recommendations.    Zander Corley DO, PhD  Internal Medicine PGY2

## 2024-08-25 NOTE — PROGRESS NOTES
Cardiac Surgery  Progress Note     Montana Kern is a 44 y.o. male on day 2 of admission presenting with Coronary artery disease involving native coronary artery of native heart.    Subjective     Interval HPI: Seen and assessed patient this AM while they were sitting up in bedside chair; in no acute distress. Endorsing mild chest discomfort.     Review of Systems   Constitutional: Negative for decreased appetite and malaise/fatigue.   HENT:  Negative for congestion.    Eyes:  Negative for blurred vision and double vision.   Cardiovascular:  Positive for dyspnea on exertion. Negative for chest pain, irregular heartbeat, leg swelling, orthopnea and palpitations.   Respiratory:  Positive for cough. Negative for shortness of breath.    Endocrine: Negative for cold intolerance and heat intolerance.   Skin:  Negative for nail changes, poor wound healing and rash.   Musculoskeletal:  Negative for arthritis, muscle cramps, muscle weakness, myalgias and stiffness.   Gastrointestinal:  Negative for bloating, nausea and vomiting.   Genitourinary:  Negative for frequency, hesitancy and urgency.   Neurological:  Positive for weakness. Negative for dizziness, focal weakness and light-headedness.   Psychiatric/Behavioral:  Negative for altered mental status.    Allergic/Immunologic: Negative for environmental allergies.         Objective   Physical Exam  Physical Exam  Vitals and nursing note reviewed.   Constitutional:       General: He is awake. He is not in acute distress.     Appearance: Normal appearance. He is not ill-appearing or diaphoretic.      Interventions: Nasal cannula in place.   HENT:      Head: Normocephalic and atraumatic.      Nose: Nose normal.      Mouth/Throat:      Mouth: Mucous membranes are moist.      Pharynx: Oropharynx is clear. No oropharyngeal exudate.   Eyes:      Extraocular Movements: Extraocular movements intact.      Conjunctiva/sclera: Conjunctivae normal.      Pupils: Pupils are equal, round,  and reactive to light.   Neck:      Comments: Right internal jugular triple lumen catheter    Cardiovascular:      Rate and Rhythm: Normal rate and regular rhythm.      Pulses: Normal pulses.      Heart sounds: Normal heart sounds, S1 normal and S2 normal. No murmur heard.     No systolic murmur is present.      No friction rub. No gallop.   Pulmonary:      Effort: Pulmonary effort is normal. No tachypnea or bradypnea.      Breath sounds: Normal breath sounds.   Chest:      Chest wall: Tenderness present.      Comments: Mediastinal, R/L Pleural Chest Tubes   Abdominal:      General: Abdomen is flat. Bowel sounds are normal. There is no distension.      Palpations: Abdomen is soft.   Musculoskeletal:         General: Normal range of motion.      Cervical back: Normal range of motion and neck supple. No edema.      Right lower leg: No edema.      Left lower leg: No edema.   Skin:     General: Skin is warm and dry.      Capillary Refill: Capillary refill takes less than 2 seconds.      Findings: No lesion.      Nails: There is no clubbing.   Neurological:      General: No focal deficit present.      Mental Status: He is alert and oriented to person, place, and time. Mental status is at baseline.      GCS: GCS eye subscore is 1. GCS verbal subscore is 1. GCS motor subscore is 1.      Cranial Nerves: Cranial nerves 2-12 are intact.      Sensory: Sensation is intact.      Motor: Motor function is intact.   Psychiatric:         Attention and Perception: Attention and perception normal.         Mood and Affect: Mood normal.         Speech: Speech normal.         Behavior: Behavior normal. Behavior is cooperative.         Thought Content: Thought content normal.         Cognition and Memory: Cognition and memory normal.         Judgment: Judgment normal.         Last Recorded Vitals  Vitals:    08/25/24 0700 08/25/24 0815 08/25/24 1010 08/25/24 1210   BP:  125/78  125/78   BP Location:  Right arm  Right arm   Patient  Position:  Sitting  Sitting   Pulse: 86 95  107   Resp: (!) 27 (!) 33  (!) 43   Temp:  36 °C (96.8 °F)  37.1 °C (98.8 °F)   TempSrc:  Temporal  Temporal   SpO2: 93% 93%  93%   Weight:   101 kg (223 lb 12.3 oz)    Height:            Intake/Output last 3 Shifts:  I/O last 3 completed shifts:  In: 4559.7 (44.9 mL/kg) [P.O.:360; I.V.:2724.7 (26.8 mL/kg); Blood:225; IV Piggyback:1250]  Out: 5920 (58.3 mL/kg) [Urine:3860 (1.1 mL/kg/hr); Blood:600; Chest Tube:1460]  Weight: 101.5 kg     Inpatient Medications  acetaminophen, 975 mg, oral, q8h  aspirin, 81 mg, oral, Daily  atorvastatin, 80 mg, oral, Nightly  ceFAZolin, 2 g, intravenous, q8h  folic acid, 1 mg, oral, Daily  furosemide, 20 mg, intravenous, BID  gabapentin, 100 mg, oral, TID  insulin lispro, 0-15 Units, subcutaneous, q4h  isosorbide dinitrate, 10 mg, oral, TID  metoprolol tartrate, 25 mg, oral, BID  multivitamin with minerals, 1 tablet, oral, Daily  pantoprazole, 40 mg, oral, Daily before breakfast  polyethylene glycol, 17 g, oral, Daily  sennosides-docusate sodium, 2 tablet, oral, BID  [START ON 8/26/2024] thiamine, 100 mg, oral, Daily      Continuous medications  lactated Ringer's, 5 mL/hr, Last Rate: 5 mL/hr (08/25/24 0616)  nitroglycerin, 5-200 mcg/min, Last Rate: Stopped (08/24/24 1500)      PRN medications  PRN medications: alteplase, calcium gluconate, calcium gluconate, dextrose **OR** glucagon, HYDROmorphone, HYDROmorphone, magnesium sulfate, magnesium sulfate, naloxone, ondansetron ODT **OR** ondansetron, oxyCODONE, oxyCODONE, potassium chloride, potassium chloride     LDA:  CVC 08/23/24 Triple lumen Right Internal jugular (Active)   Placement Date/Time: 08/23/24 (c) 9435   Hand Hygiene Performed Prior to CVC Insertion: Yes  Site Prep: Chlorhexidine   Site Prep Agent has Completely Dried Before Insertion: Yes  All 5 Sterile Barriers Used (Gloves, Gown, Cap, Mask, Large Sterile Jossy...   Number of days: 1       Urethral Catheter Temperature  probe;Straight-tip 16 Fr. (Active)   Placement Date/Time: 08/23/24 1240   Placed by: IMELDA KESSLER  Hand Hygiene Completed: Yes  Catheter Type: Temperature probe;Straight-tip  Tube Size (Fr.): 16 Fr.  Catheter Balloon Size: 10 mL  Urine Returned: Yes   Number of days: 1       Chest Tube 1 Left Pleural 28 Fr (Active)   Placement Date/Time: 08/23/24 1757   Placed by: DR. MCCOY  Hand Hygiene Completed: Yes  Tube Number: 1  Chest Tube Orientation: Left  Chest Tube Location: Pleural  Chest Tube Drain Tube Size (Fr): 28 Fr  Chest Tube Drainage System: (c) Dry seal ches...   Number of days: 0       Chest Tube 2 Mediastinal 28 Fr (Active)   Placement Date/Time: 08/23/24 1759   Placed by: DR. MCCOY  Hand Hygiene Completed: Yes  Tube Number: 2  Chest Tube Location: Mediastinal  Chest Tube Drain Tube Size (Fr): 28 Fr  Chest Tube Drainage System: (c) Dry seal chest drain   Number of days: 0       Chest Tube 3 Right Pleural 28 Fr (Active)   Placement Date/Time: 08/23/24 1759   Placed by: DR. MCCOY  Hand Hygiene Completed: Yes  Tube Number: 3  Chest Tube Orientation: Right  Chest Tube Location: Pleural  Chest Tube Drain Tube Size (Fr): 28 Fr  Chest Tube Drainage System: (c) Dry seal alice...   Number of days: 0       Relevant Results  Lab Review  Results from last 7 days   Lab Units 08/25/24  0433   WBC AUTO x10*3/uL 10.1   HEMOGLOBIN g/dL 10.8*   HEMATOCRIT % 33.0*   PLATELETS AUTO x10*3/uL 147*     Results from last 7 days   Lab Units 08/25/24  0411   SODIUM mmol/L 136   POTASSIUM mmol/L 3.6   CHLORIDE mmol/L 102   CO2 mmol/L 28   BUN mg/dL 11   CREATININE mg/dL 0.79   CALCIUM mg/dL 8.4*   GLUCOSE mg/dL 139*     Results from last 7 days   Lab Units 08/25/24  0411   MAGNESIUM mg/dL 1.87     Results from last 7 days   Lab Units 08/23/24  2112   POCT PH, ARTERIAL pH 7.34*   POCT PCO2, ARTERIAL mm Hg 43*   POCT PO2, ARTERIAL mm Hg 118*   POCT HCO3 CALCULATED, ARTERIAL mmol/L 23.2   POCT BASE EXCESS, ARTERIAL mmol/L -2.6*          Cardiology Tests       Echo:  Transthoracic Echo (TTE) Complete 07/26/2024  PHYSICIAN INTERPRETATION:  Left Ventricle: Left ventricular ejection fraction is normal, by visual estimate at 55-60%. Wall motion is abnormal. The left ventricular cavity size is normal. Spectral Doppler shows a normal pattern of left ventricular diastolic filling. The apex is hypokinetic particularly the septal aspect. Inferior aspect of the apex slightly hypokinetic as well.  Left Atrium: The left atrium is mild to moderately dilated.  Right Ventricle: The right ventricle is normal in size. There is normal right ventricular global systolic function.  Right Atrium: The right atrium is normal in size.  Aortic Valve: The aortic valve is trileaflet. The aortic valve dimensionless index is 0.90. There is no evidence of aortic valve regurgitation. The peak instantaneous gradient of the aortic valve is 5.3 mmHg. The mean gradient of the aortic valve is 3.0 mmHg.  Mitral Valve: The mitral valve is normal in structure. There is trace mitral valve regurgitation.  Tricuspid Valve: The tricuspid valve is structurally normal. There is trace to mild tricuspid regurgitation.  Pulmonic Valve: The pulmonic valve is structurally normal. There is trace pulmonic valve regurgitation.  Pericardium: There is no pericardial effusion noted.  Aorta: The aortic root is abnormal. There is mild dilatation of the ascending aorta. There is mild dilatation of the aortic root. Mild aortic root dilatation at 4 cm mild ascending aorta dilatation at 4.1 cm.        CONCLUSIONS:   1. Left ventricular ejection fraction is normal, by visual estimate at 55-60%.   2. The apex is hypokinetic particularly the septal aspect. Inferior aspect of the apex slightly hypokinetic as well.   3. There is normal right ventricular global systolic function.   4. The left atrium is mild to moderately dilated.   5. Mild aortic root dilatation at 4 cm mild ascending aorta dilatation at 4.1  cm.   6. Normal valve function.    Ejection Fractions:  EF   Date/Time Value Ref Range Status   07/26/2024 01:49 PM 58 %      Cath:  Cardiac Catheterization Procedure 07/26/2024  Coronary Angiography:  The coronary circulation is right dominant.     Left Main Coronary Artery:  The left main coronary artery is free of atherosclerotic disease.     Left Anterior Descending Coronary Artery Distribution:  The Left Anterior Descending artery is a large vessel. There is 90% stenosis in the ostial left anterior descending artery. Hemodynamically significant obstruction is noted in this vessel. There is 100% stenosis in the mid left anterior descending artery.     Circumflex Coronary Artery Distribution:  The Left Circumflex artery is a medium-sized vessel. Hemodynamically significant obstruction is noted in this vessel. There is 90% stenosis in the the ostial Circumflex artery.     Right Coronary Artery Distribution:     The Right Coronary Artery is a large vessel. Hemodynamically significant obstruction is noted in this vessel. There is 75% stenosis in the the proximal Right Coronary Artery. The distal right coronary artery showed 90% stenosis. Posterior Lateral Branch right coronary artery showed 90% stenosis.        Left Ventriculography:  The estimated left ventricular ejection fraction is 50%.  Stress Test:  Nuclear Stress Test 07/11/2024  IMPRESSION:  Suspicion of a prior infarct along the distal anterior and distal  septal wall extending into the apex with evidence of moderate  chris-infarct ischemia along the distal septal and distal anterior  regions.      Left ventricle is enlarged in size      Normal wall motion with post-stress LVEF of 46%             History of Present Illness: Montana Kern is a 44 year old male with PMH HTN, HLD, and NIDDM. Now s/p CABGx3 (LIMA-LAD, SVG-PDA, Radial-OM), LAAL, Ligation Ligament of zahra, and pericardial window with Dr. Zhao on 8/23.     Daily Events    8/24/24: No acute  events overnight; patient extubated in the immediate post-op period. No vasopressors, transitioning IV nitrate to PO. Maintaining chest tubes today. Starting on low dose beta blockade.     - Current O2 Requirements: 2L NC      8/25/24: Continues with no acute events overnight. Chest tube output slowed, plan for removal today. Increasing beta blockade and adding on IV diuresis.     - Current O2 Requirements: RA     Assessment & Plan       Multivessel Coronary Artery Disease  - S/p CABG x 3 on 8/24/24 with Dr. Michael Zhao (LIMA-LAD, RA-OM, SVG-rPDA)  - Continue on ASA 81mg and Atorvastatin 80mg  - Increasing metoprolol tartrate to 25mg BID   - Discontinue chest tubes today       Radial Grafting  - S/p radial harvest site  - Continue on isosorbide dinitrate 10mg TID        Mediastinal Incision  - Well approximated, C/D/I       Acute Post-Op Pain  - As expected   - Multimodal pain control with scheduled magnesium oxide 400mg QD, gabapentin 100mg TID, and acetaminophen 650 q6h (PRN oxycodone & fentanyl)  - Bowel regimen while taking narcotics        Risk for Post-Op Arrhythmia  - Ventricular wires placed  - Discontinue V-wires prior to DC  - Telemetry until discharged       Acute Postoperative Respiratory Insufficiency   - As expected following CABG with post-op atelectasis  - Current O2 Requirements: RA   - Continue coughing and deep breathing exercises.   - Incentive spirometry  - Out of bed, early and aggressive mobilization with assistance  - Oxygen as needed, wean to keep saturation above 92%  - ICU intensivist/pulmonologist consulted  - Albuterol nebulizers as needed       Risk for Fluid Volume Overload  - Pre-Op Weight: 96 kg     8/24: 102 kg (net positive 2717mL)      8/25: 101 kg (net positive 1374mL) --> IV furosemide 20mg BID    - Strict I& Os and daily weights         Acute Post-Op Blood-Loss Anemia  - Pre-Op H/H: 13.3/40.6     8/24: 10.6/31.8     8/25: 10.8/33  - Monitor h/h in the initial post op period  -  Multivitamin/iron tablet   - Daily CBC while in hospital       Post-Op Leukocytosis  - Pre-Op WBC: 5.5     8/24: 7.6     8/25: 10.1  - Afebrile, consider elevations as reactive to surgery   - Post-op ATB Q12 for 48hrs  - Daily cbc while in hospital        Essential HTN  - Home Medications: Metoprolol succinate 50mg every day, lisinopril 40mg  - Restarting BB as above, hold ACE-I        Type 2 DM  - Home Medications: metformin 1g BID, glimepiride 4mg, semaglutide 1mg   - HbA1c: 6.9  - Goal for BG <150 in the post-operative period  - SSI        Dyslipidemia  - Home Medications: Atoravastatin 80mg   - Continue on statin therapy as above     Risk for Electrolyte Disturbances  - Optimize electrolytes per [facility] protocol      Bowel Regimen  - Senna-S BID   - PRN suppositories and miralax    Prophylaxis  - GI: PPI  - DVT: Julio-Hose & enoxaparin   - MRSA: Negative (8/6)   - PT/OT eval-once clinically stable     Disposition  - CVICU (stepdown)     Patient  plan discussed with Dr. Michael Zhao.      Dexter Sommer, APRN-CNP

## 2024-08-26 ENCOUNTER — APPOINTMENT (OUTPATIENT)
Dept: RADIOLOGY | Facility: HOSPITAL | Age: 44
DRG: 236 | End: 2024-08-26
Payer: COMMERCIAL

## 2024-08-26 VITALS
HEIGHT: 69 IN | BODY MASS INDEX: 33.14 KG/M2 | HEART RATE: 89 BPM | RESPIRATION RATE: 18 BRPM | SYSTOLIC BLOOD PRESSURE: 126 MMHG | WEIGHT: 223.77 LBS | TEMPERATURE: 99.9 F | DIASTOLIC BLOOD PRESSURE: 70 MMHG | OXYGEN SATURATION: 95 %

## 2024-08-26 LAB
ALBUMIN SERPL BCP-MCNC: 3.3 G/DL (ref 3.4–5)
ANION GAP SERPL CALC-SCNC: 11 MMOL/L (ref 10–20)
ATRIAL RATE: 88 BPM
ATRIAL RATE: 88 BPM
ATRIAL RATE: 90 BPM
BUN SERPL-MCNC: 10 MG/DL (ref 6–23)
CALCIUM SERPL-MCNC: 8.3 MG/DL (ref 8.6–10.3)
CHLORIDE SERPL-SCNC: 99 MMOL/L (ref 98–107)
CO2 SERPL-SCNC: 27 MMOL/L (ref 21–32)
CREAT SERPL-MCNC: 0.7 MG/DL (ref 0.5–1.3)
DIASTOLIC BLOOD PRESSURE: 70 MMHG
DIASTOLIC BLOOD PRESSURE: 71 MMHG
DIASTOLIC BLOOD PRESSURE: 72 MMHG
EGFRCR SERPLBLD CKD-EPI 2021: >90 ML/MIN/1.73M*2
ERYTHROCYTE [DISTWIDTH] IN BLOOD BY AUTOMATED COUNT: 12.6 % (ref 11.5–14.5)
GLUCOSE BLD MANUAL STRIP-MCNC: 118 MG/DL (ref 74–99)
GLUCOSE BLD MANUAL STRIP-MCNC: 153 MG/DL (ref 74–99)
GLUCOSE BLD MANUAL STRIP-MCNC: 225 MG/DL (ref 74–99)
GLUCOSE BLD MANUAL STRIP-MCNC: 249 MG/DL (ref 74–99)
GLUCOSE BLD MANUAL STRIP-MCNC: 250 MG/DL (ref 74–99)
GLUCOSE SERPL-MCNC: 150 MG/DL (ref 74–99)
HCT VFR BLD AUTO: 31.5 % (ref 41–52)
HGB BLD-MCNC: 10.3 G/DL (ref 13.5–17.5)
MAGNESIUM SERPL-MCNC: 1.8 MG/DL (ref 1.6–2.4)
MCH RBC QN AUTO: 27 PG (ref 26–34)
MCHC RBC AUTO-ENTMCNC: 32.7 G/DL (ref 32–36)
MCV RBC AUTO: 83 FL (ref 80–100)
NRBC BLD-RTO: 0 /100 WBCS (ref 0–0)
P AXIS: 44 DEGREES
P AXIS: 69 DEGREES
P AXIS: 70 DEGREES
P OFFSET: 182 MS
P OFFSET: 187 MS
P OFFSET: 191 MS
P ONSET: 128 MS
P ONSET: 130 MS
P ONSET: 138 MS
PHOSPHATE SERPL-MCNC: 2.7 MG/DL (ref 2.5–4.9)
PLATELET # BLD AUTO: 152 X10*3/UL (ref 150–450)
POTASSIUM SERPL-SCNC: 3.7 MMOL/L (ref 3.5–5.3)
PR INTERVAL: 148 MS
PR INTERVAL: 166 MS
PR INTERVAL: 170 MS
Q ONSET: 212 MS
Q ONSET: 213 MS
Q ONSET: 213 MS
QRS COUNT: 14 BEATS
QRS COUNT: 14 BEATS
QRS COUNT: 15 BEATS
QRS DURATION: 90 MS
QRS DURATION: 90 MS
QRS DURATION: 94 MS
QT INTERVAL: 350 MS
QT INTERVAL: 358 MS
QT INTERVAL: 366 MS
QTC CALCULATION(BAZETT): 428 MS
QTC CALCULATION(BAZETT): 433 MS
QTC CALCULATION(BAZETT): 442 MS
QTC FREDERICIA: 400 MS
QTC FREDERICIA: 406 MS
QTC FREDERICIA: 416 MS
R AXIS: 47 DEGREES
R AXIS: 63 DEGREES
R AXIS: 73 DEGREES
RBC # BLD AUTO: 3.82 X10*6/UL (ref 4.5–5.9)
SODIUM SERPL-SCNC: 133 MMOL/L (ref 136–145)
SYSTOLIC BLOOD PRESSURE: 107 MMHG
SYSTOLIC BLOOD PRESSURE: 117 MMHG
SYSTOLIC BLOOD PRESSURE: 120 MMHG
T AXIS: -13 DEGREES
T AXIS: 18 DEGREES
T AXIS: 24 DEGREES
T OFFSET: 388 MS
T OFFSET: 391 MS
T OFFSET: 396 MS
VENTRICULAR RATE: 88 BPM
VENTRICULAR RATE: 88 BPM
VENTRICULAR RATE: 90 BPM
WBC # BLD AUTO: 11.6 X10*3/UL (ref 4.4–11.3)

## 2024-08-26 PROCEDURE — 82947 ASSAY GLUCOSE BLOOD QUANT: CPT

## 2024-08-26 PROCEDURE — 2500000002 HC RX 250 W HCPCS SELF ADMINISTERED DRUGS (ALT 637 FOR MEDICARE OP, ALT 636 FOR OP/ED)

## 2024-08-26 PROCEDURE — 85027 COMPLETE CBC AUTOMATED: CPT | Performed by: NURSE PRACTITIONER

## 2024-08-26 PROCEDURE — 2500000002 HC RX 250 W HCPCS SELF ADMINISTERED DRUGS (ALT 637 FOR MEDICARE OP, ALT 636 FOR OP/ED): Performed by: NURSE PRACTITIONER

## 2024-08-26 PROCEDURE — 99233 SBSQ HOSP IP/OBS HIGH 50: CPT | Performed by: NURSE PRACTITIONER

## 2024-08-26 PROCEDURE — 80069 RENAL FUNCTION PANEL: CPT | Performed by: NURSE PRACTITIONER

## 2024-08-26 PROCEDURE — 83735 ASSAY OF MAGNESIUM: CPT | Performed by: NURSE PRACTITIONER

## 2024-08-26 PROCEDURE — 97530 THERAPEUTIC ACTIVITIES: CPT | Mod: GP

## 2024-08-26 PROCEDURE — 36415 COLL VENOUS BLD VENIPUNCTURE: CPT | Performed by: NURSE PRACTITIONER

## 2024-08-26 PROCEDURE — 71045 X-RAY EXAM CHEST 1 VIEW: CPT | Performed by: RADIOLOGY

## 2024-08-26 PROCEDURE — 71045 X-RAY EXAM CHEST 1 VIEW: CPT

## 2024-08-26 PROCEDURE — 2500000004 HC RX 250 GENERAL PHARMACY W/ HCPCS (ALT 636 FOR OP/ED)

## 2024-08-26 PROCEDURE — 2500000001 HC RX 250 WO HCPCS SELF ADMINISTERED DRUGS (ALT 637 FOR MEDICARE OP): Performed by: NURSE PRACTITIONER

## 2024-08-26 PROCEDURE — 2500000004 HC RX 250 GENERAL PHARMACY W/ HCPCS (ALT 636 FOR OP/ED): Performed by: NURSE PRACTITIONER

## 2024-08-26 PROCEDURE — 2060000001 HC INTERMEDIATE ICU ROOM DAILY

## 2024-08-26 PROCEDURE — 2500000001 HC RX 250 WO HCPCS SELF ADMINISTERED DRUGS (ALT 637 FOR MEDICARE OP)

## 2024-08-26 RX ORDER — POTASSIUM CHLORIDE 750 MG/1
10 TABLET, FILM COATED, EXTENDED RELEASE ORAL ONCE
Status: COMPLETED | OUTPATIENT
Start: 2024-08-26 | End: 2024-08-26

## 2024-08-26 ASSESSMENT — ENCOUNTER SYMPTOMS
MUSCLE CRAMPS: 0
IRREGULAR HEARTBEAT: 0
ALTERED MENTAL STATUS: 0
FOCAL WEAKNESS: 0
LIGHT-HEADEDNESS: 0
WEAKNESS: 1
BLURRED VISION: 0
COUGH: 1
PALPITATIONS: 0
VOMITING: 0
ORTHOPNEA: 0
FREQUENCY: 0
MYALGIAS: 0
BLOATING: 0
DECREASED APPETITE: 0
NAIL CHANGES: 0
DYSPNEA ON EXERTION: 1
DOUBLE VISION: 0
SHORTNESS OF BREATH: 0
STIFFNESS: 0
POOR WOUND HEALING: 0
NAUSEA: 0
DIZZINESS: 0

## 2024-08-26 ASSESSMENT — COGNITIVE AND FUNCTIONAL STATUS - GENERAL
MOBILITY SCORE: 24
DRESSING REGULAR LOWER BODY CLOTHING: A LITTLE
DAILY ACTIVITIY SCORE: 22
HELP NEEDED FOR BATHING: A LITTLE

## 2024-08-26 ASSESSMENT — PAIN - FUNCTIONAL ASSESSMENT
PAIN_FUNCTIONAL_ASSESSMENT: 0-10

## 2024-08-26 ASSESSMENT — PAIN DESCRIPTION - DESCRIPTORS: DESCRIPTORS: ACHING

## 2024-08-26 ASSESSMENT — PAIN SCALES - GENERAL
PAINLEVEL_OUTOF10: 0 - NO PAIN
PAINLEVEL_OUTOF10: 6
PAINLEVEL_OUTOF10: 5 - MODERATE PAIN
PAINLEVEL_OUTOF10: 0 - NO PAIN
PAINLEVEL_OUTOF10: 0 - NO PAIN

## 2024-08-26 NOTE — PROGRESS NOTES
Occupational Therapy    OT Treatment    Patient Name: Montana Kern  MRN: [id number]  Today's Date: 8/26/2024  Time Calculation  Start Time: 1106  Stop Time: 1120  Time Calculation (min): 14 min        Assessment:  End of Session Communication: Bedside nurse  End of Session Patient Position: Up in chair, Alarm off, not on at start of session     Plan:  Treatment Interventions: ADL retraining, Functional transfer training, Endurance training, Neuromuscular reeducation, Compensatory technique education  OT Frequency: 3 times per week  OT Discharge Recommendations: Low intensity level of continued care  OT - OK to Discharge: Yes (from an O.T. standpoint)  Treatment Interventions: ADL retraining, Functional transfer training, Endurance training, Neuromuscular reeducation, Compensatory technique education    Subjective   Previous Visit Info:  OT Last Visit  OT Received On: 08/26/24  General:  General  Prior to Session Communication: Bedside nurse  Patient Position Received: Up in chair, Alarm off, not on at start of session  General Comment: pt. agreeable to therapy intervention  Precautions:  Precautions Comment: MITT, tele, room air  Vital Signs:  Vital Signs  Heart Rate:  (VSS)  Pain:  Pain Assessment  Pain Assessment:  (no pain complaints)    Objective    Cognition:  Cognition  Overall Cognitive Status: Within Functional Limits    LE Dressing  LE Dressing:  (pt. able to don pants seated eob with supervision, cues for energy conservation and deep breathing)    Toileting  Toileting Comments: pt. able to urinate while standing with use of urinal, independent  Functional Standing Tolerance:     Bed Mobility/Transfers: Bed Mobility  Bed Mobility:  (independent supine to sit)    Transfers  Transfer:  (sit<> stand from eob/recliner chair:  independent)      Functional Mobility:  Functional Mobility  Functional Mobility Performed:  (mobility within hospital room to access toilet area, approach the chair from the doorway  around obstacles, independent)    Outcome Measures:[facility] Daily Activity  Putting on and taking off regular lower body clothing: A little  Bathing (including washing, rinsing, drying): A little  Putting on and taking off regular upper body clothing: None  Toileting, which includes using toilet, bedpan or urinal: None  Taking care of personal grooming such as brushing teeth: None  Eating Meals: None  Daily Activity - Total Score: 22         and Early Mobility/Exercise Safety Screen: Proceed with mobilization - No exclusion criteria met  ICU Mobility Scale: Walking independently without a gait aid [10]    Education Documentation  Precautions, taught by Carla Braga OT at 8/26/2024  1:20 PM.  Learner: Patient  Readiness: Acceptance  Method: Explanation  Response: Verbalizes Understanding, Needs Reinforcement    ADL Training, taught by Carla Braga OT at 8/26/2024  1:20 PM.  Learner: Patient  Readiness: Acceptance  Method: Explanation  Response: Verbalizes Understanding, Needs Reinforcement         Goals:  Encounter Problems       Encounter Problems (Active)       OT Goals       Increase LE bathing & dressing to supervision with adaptive equipment as needed.  (Met)       Start:  08/24/24    Expected End:  09/07/24    Resolved:  08/26/24         Increase functional transfers & mobility to/from bed, chair & commode to supervision with DME for safety  (Met)       Start:  08/24/24    Expected End:  09/07/24    Resolved:  08/26/24         Demonstrate compliance to post op precautions, energy conservation techs and safety techs during ADLs  (Progressing)       Start:  08/24/24    Expected End:  09/07/24            Increase dynamic stand balance to supervision with UE support to promote increased independence with ADL & functional transfers  (Met)       Start:  08/24/24    Expected End:  09/07/24    Resolved:  08/26/24

## 2024-08-26 NOTE — PROGRESS NOTES
Met with patient and family at bedside for discharge planning.  PT/OT recommending low intensity level therapy, AMPACs 15/18.  Patient agreeable to HHC.  [place] of choice explained, HHC list given to family.  Family states, patient's wife is at work and plans to visit this afternoon and can discuss.  Care Transitions will continue to follow.

## 2024-08-26 NOTE — PROGRESS NOTES
Subjective   Montana Kern is a 44 y.o. male who presents with No chief complaint on file..    Patient doing well. Pain well controlled. Required 2L NC overnight. Chest tubes and quinteros removed yesterday.     Objective   Vitals:    08/26/24 1200   BP:    Pulse:    Resp:    Temp: 37 °C (98.6 °F)   SpO2:       Physical Exam  Physical Exam:  Constitutional: obese, awake, alert, no acute distress  ENMT: mucous membranes moist, EOMI, conjunctivae clear  Head/Neck: normocephalic, atraumatic; supple, trachea midline  Respiratory/Thorax: patent airways, incision down sternum   Cardiovascular: RRR, no murmur  Gastrointestinal: soft, nondistended, non-tender, bowel sounds appreciated  Extremities: palpable peripheral pulses, no edema or cyanosis  Neurological: AO x3, no focal deficits  Psychological: appropriate mood and behavior  Skin: warm and dry    Assessment/Plan       Montana Kern is a 44 y.o. male never smoker with history of obesity, DM2, DLD, HTN, nephrolithiasis, ?asthma and multi-vessel CAD presenting status post 3V CABG (LIMA to LAD, SVG to PDA, and RA to OM), LAAL and posterior pericardial window. Critical care consulted for post op ICU management.     Neuro:  - Patient orientated to person place and time  - Sedation: none  - Analgesia: tylenol, dilaudid PRN per CTS  - Maintain normal sleep/wake cycle    Cardiovascular  - Last TTE 7/26/24 showed EF 55-60%  - On ASA and statin. Metoprolol tartrate 50 BID  - Diuresis per CTS. Keep Mg>2, K>4.    Pulmonary:  - Patient extubated on 8/23/24, currently on RA  - Chest tube management per CTS  - Daily CXR  - Encourage incentive spirometer use q1    GI:  - Diet: Cardiac diet  - Prophylaxis: PPI  - No expected issues    Renal:   - No current issues  - Quinteros in place, strict I&O's  - Trend renal panel, replete electrolytes PRN    Endocrine:  -T2DM   -SSI, maintain BG between 140-180s    Heme/Onc:  - DVT PPX per CTS  - Trend H&H    ID:  - No acute issue    Skin/MSK:  - No  acute issues    ICU CHECK LIST:   Antimicrobials: -  Oxygen: RA  Feeding: cardiac diet  Fluids: -  Analgesia: tylenol, gabapentin, dilaudid PRN  Sedation: -  Thromboprophylaxis: SCDs  Ulcer prophylaxis: PPI  Glycemic control: SSI  Bowel care: chris-colase  Indwelling catheters: -  Lines: PIVs    Code Status: FULL    This is a preliminary note written by the resident. Please wait for attending addendum for finalization of note and recommendations.    Zander Corley DO, PhD  Internal Medicine PGY2

## 2024-08-26 NOTE — PROGRESS NOTES
Physical Therapy    Physical Therapy Treatment    Patient Name: Montana Kern  MRN: [id number]  [id number]  Today's Date: 8/26/2024  Time Calculation  Start Time: 1105  Stop Time: 1120  Time Calculation (min): 15 min       Assessment/Plan   PT Assessment  PT Assessment Results: Decreased strength, Decreased endurance, Decreased mobility  Rehab Prognosis: Good  Evaluation/Treatment Tolerance: Patient tolerated treatment well  End of Session Communication: Bedside nurse  End of Session Patient Position: Up in chair, Alarm off, not on at start of session     PT Plan  Treatment/Interventions: Bed mobility, Transfer training, Gait training, Stair training, Strengthening, Endurance training, Therapeutic exercise, Therapeutic activity  PT Plan: Ongoing PT  PT Frequency: 4 times per week  PT Discharge Recommendations: No further acute PT, No PT needed after discharge (pt has met all goals, no further PT needs.)  PT - OK to Discharge: Yes (when cleared by medical team)    Current Problem:  Patient Active Problem List   Diagnosis    Type 2 diabetes mellitus treated without insulin (Multi)    Essential hypertension, benign    Dyslipidemia    Acute non-recurrent maxillary sinusitis    Pain in joint involving right ankle and foot    Low back pain    Exertional chest pain    BMI 30.0-30.9,adult    Never smoked cigarettes    Abnormal stress test    Angina, class III (CMS-HCC)    Angina pectoris, unstable (Multi)    Coronary artery disease involving native coronary artery of native heart    CAD in native artery       General Visit Information:   PT  Visit  PT Received On: 08/26/24  General  Reason for Referral: PT eval and treat: s/p CABG x 3 on 8/23/24, left atrial appendage ligation  Referred By: Sharri Vallejo  Past Medical History Relevant to Rehab: HTN, HLD, DM, CAD, EtOH use, OA of ankles  Prior to Session Communication: Bedside nurse  Patient Position Received: Alarm off, not on at start of session, Bed, 3 rail up  General  Comment: Pt resting in bed upon entering, agreeable to PT. Mother present during session  Subjective     Precautions:  Precautions  Medical Precautions: Cardiac precautions  Post-Surgical Precautions: Move in the Tube  Precautions Comment: MITT, tele, room air; reviewed precautions during session    Vital Signs:  Vital Signs  Heart Rate:  (VSS throughout session)  Objective     Pain:  Pain Assessment  Pain Assessment:  (2/10 incisional pain)    Cognition:  Cognition  Overall Cognitive Status: Within Functional Limits    Activity Tolerance:  Activity Tolerance  Early Mobility/Exercise Safety Screen: Proceed with mobilization - No exclusion criteria met    Treatments:           Bed Mobility  Bed Mobility:  (supine to sit IND)  Ambulation/Gait Training  Ambulation/Gait Training Performed:  (Pt ambulates >200ft during session with no device, independent with no acute LOB, no SOB.)  Transfers  Transfer:  (sit to stand independent)  Stairs  Stairs:  (Pt ascends/descends 5 stairs x 2 with rail with reciprocal, independent with no instruction needed)       Outcome Measures:  [facility] Basic Mobility  Turning from your back to your side while in a flat bed without using bedrails: None  Moving from lying on your back to sitting on the side of a flat bed without using bedrails: None  Moving to and from bed to chair (including a wheelchair): None  Standing up from a chair using your arms (e.g. wheelchair or bedside chair): None  To walk in hospital room: None  Climbing 3-5 steps with railing: None  Basic Mobility - Total Score: 24  Education Documentation  Precautions, taught by Neha Keating PT at 8/26/2024  2:22 PM.  Learner: Patient  Readiness: Acceptance  Method: Explanation  Response: Verbalizes Understanding    Mobility Training, taught by Neha Keating PT at 8/26/2024  2:22 PM.  Learner: Patient  Readiness: Acceptance  Method: Explanation  Response: Verbalizes Understanding    Education Comments  No comments  found.        EDUCATION:     Encounter Problems       Encounter Problems (Active)       Pain - Adult             Encounter Problems (Resolved)       PT Problem       STG - Pt will amb >150' using no device with IND  (Met)       Start:  08/24/24    Expected End:  09/07/24    Resolved:  08/26/24         STG - Pt will transition supine <> sitting with MOD IND  (Met)       Start:  08/24/24    Expected End:  09/07/24    Resolved:  08/26/24         STG - Pt will transfer STS with IND no device (Met)       Start:  08/24/24    Expected End:  09/07/24    Resolved:  08/26/24         STG -  Pt will navigate 10 stairs using rail with supervision  (Met)       Start:  08/24/24    Expected End:  09/07/24    Resolved:  08/26/24

## 2024-08-26 NOTE — PROGRESS NOTES
Cardiac Surgery  Progress Note     Montana Kern is a 44 y.o. male on day 3 of admission presenting with Coronary artery disease involving native coronary artery of native heart.    Subjective     Interval HPI: Seen and assessed patient this AM while they were sitting up in bedside chair; in no acute distress. Endorsing mild chest discomfort.     Review of Systems   Constitutional: Negative for decreased appetite and malaise/fatigue.   HENT:  Negative for congestion.    Eyes:  Negative for blurred vision and double vision.   Cardiovascular:  Positive for dyspnea on exertion. Negative for chest pain, irregular heartbeat, leg swelling, orthopnea and palpitations.   Respiratory:  Positive for cough. Negative for shortness of breath.    Endocrine: Negative for cold intolerance and heat intolerance.   Skin:  Negative for nail changes, poor wound healing and rash.   Musculoskeletal:  Negative for arthritis, muscle cramps, muscle weakness, myalgias and stiffness.   Gastrointestinal:  Negative for bloating, nausea and vomiting.   Genitourinary:  Negative for frequency, hesitancy and urgency.   Neurological:  Positive for weakness. Negative for dizziness, focal weakness and light-headedness.   Psychiatric/Behavioral:  Negative for altered mental status.    Allergic/Immunologic: Negative for environmental allergies.         Objective   Physical Exam  Physical Exam  Vitals and nursing note reviewed.   Constitutional:       General: He is awake. He is not in acute distress.     Appearance: Normal appearance. He is not ill-appearing or diaphoretic.      Interventions: Nasal cannula in place.   HENT:      Head: Normocephalic and atraumatic.      Nose: Nose normal.      Mouth/Throat:      Mouth: Mucous membranes are moist.      Pharynx: Oropharynx is clear. No oropharyngeal exudate.   Eyes:      Extraocular Movements: Extraocular movements intact.      Conjunctiva/sclera: Conjunctivae normal.      Pupils: Pupils are equal, round,  and reactive to light.   Cardiovascular:      Rate and Rhythm: Normal rate and regular rhythm.      Pulses: Normal pulses.      Heart sounds: Normal heart sounds, S1 normal and S2 normal. No murmur heard.     No systolic murmur is present.      No friction rub. No gallop.   Pulmonary:      Effort: Pulmonary effort is normal. No tachypnea or bradypnea.      Breath sounds: Normal breath sounds.   Chest:      Chest wall: Tenderness present.   Abdominal:      General: Abdomen is flat. Bowel sounds are normal. There is no distension.      Palpations: Abdomen is soft.   Musculoskeletal:         General: Normal range of motion.      Cervical back: Normal range of motion and neck supple. No edema.      Right lower leg: No edema.      Left lower leg: No edema.   Skin:     General: Skin is warm and dry.      Capillary Refill: Capillary refill takes less than 2 seconds.      Findings: No lesion.      Nails: There is no clubbing.      Comments: Mediastinal incision PAMELA   Neurological:      General: No focal deficit present.      Mental Status: He is alert and oriented to person, place, and time. Mental status is at baseline.      GCS: GCS eye subscore is 1. GCS verbal subscore is 1. GCS motor subscore is 1.      Cranial Nerves: Cranial nerves 2-12 are intact.      Sensory: Sensation is intact.      Motor: Motor function is intact.   Psychiatric:         Attention and Perception: Attention and perception normal.         Mood and Affect: Mood normal.         Speech: Speech normal.         Behavior: Behavior normal. Behavior is cooperative.         Thought Content: Thought content normal.         Cognition and Memory: Cognition and memory normal.         Judgment: Judgment normal.         Last Recorded Vitals  Vitals:    08/26/24 1200 08/26/24 1500 08/26/24 1600 08/26/24 1612   BP:    118/68   BP Location:       Patient Position:       Pulse:    93   Resp:       Temp: 37 °C (98.6 °F) 37.7 °C (99.9 °F) 37.1 °C (98.8 °F)    TempSrc:  Temporal Temporal Temporal    SpO2:       Weight:       Height:            Intake/Output last 3 Shifts:  I/O last 3 completed shifts:  In: 2550.7 (25.4 mL/kg) [P.O.:1590; I.V.:360.7 (3.6 mL/kg); IV Piggyback:600]  Out: 5323 (53.1 mL/kg) [Urine:4803 (1.3 mL/kg/hr); Chest Tube:520]  Weight: 100.2 kg     Inpatient Medications  acetaminophen, 975 mg, oral, q8h  aspirin, 81 mg, oral, Daily  atorvastatin, 80 mg, oral, Nightly  folic acid, 1 mg, oral, Daily  furosemide, 20 mg, intravenous, BID  gabapentin, 100 mg, oral, TID  insulin glargine, 12 Units, subcutaneous, q24h  insulin lispro, 0-15 Units, subcutaneous, q4h  isosorbide dinitrate, 10 mg, oral, TID  metoprolol tartrate, 50 mg, oral, BID  multivitamin with minerals, 1 tablet, oral, Daily  pantoprazole, 40 mg, oral, Daily before breakfast  polyethylene glycol, 17 g, oral, Daily  sennosides-docusate sodium, 2 tablet, oral, BID  thiamine, 100 mg, oral, Daily      Continuous medications  lactated Ringer's, 5 mL/hr, Last Rate: 5 mL/hr (08/25/24 0616)      PRN medications  PRN medications: alteplase, calcium gluconate, calcium gluconate, dextrose **OR** glucagon, HYDROmorphone, HYDROmorphone, magnesium sulfate, magnesium sulfate, naloxone, ondansetron ODT **OR** ondansetron, oxyCODONE, oxyCODONE, potassium chloride, potassium chloride     LDA:  CVC 08/23/24 Triple lumen Right Internal jugular (Active)   Placement Date/Time: 08/23/24 (c) 1242   Hand Hygiene Performed Prior to CVC Insertion: Yes  Site Prep: Chlorhexidine   Site Prep Agent has Completely Dried Before Insertion: Yes  All 5 Sterile Barriers Used (Gloves, Gown, Cap, Mask, Large Sterile Jossy...   Number of days: 1       Urethral Catheter Temperature probe;Straight-tip 16 Fr. (Active)   Placement Date/Time: 08/23/24 1240   Placed by: IMELDA KESSLER  Hand Hygiene Completed: Yes  Catheter Type: Temperature probe;Straight-tip  Tube Size (Fr.): 16 Fr.  Catheter Balloon Size: 10 mL  Urine Returned: Yes   Number of days:  1       Chest Tube 1 Left Pleural 28 Fr (Active)   Placement Date/Time: 08/23/24 1757   Placed by: DR. MCCOY  Hand Hygiene Completed: Yes  Tube Number: 1  Chest Tube Orientation: Left  Chest Tube Location: Pleural  Chest Tube Drain Tube Size (Fr): 28 Fr  Chest Tube Drainage System: (c) Dry seal ches...   Number of days: 0       Chest Tube 2 Mediastinal 28 Fr (Active)   Placement Date/Time: 08/23/24 1759   Placed by: DR. MCCOY  Hand Hygiene Completed: Yes  Tube Number: 2  Chest Tube Location: Mediastinal  Chest Tube Drain Tube Size (Fr): 28 Fr  Chest Tube Drainage System: (c) Dry seal chest drain   Number of days: 0       Chest Tube 3 Right Pleural 28 Fr (Active)   Placement Date/Time: 08/23/24 1759   Placed by: DR. MCCOY  Hand Hygiene Completed: Yes  Tube Number: 3  Chest Tube Orientation: Right  Chest Tube Location: Pleural  Chest Tube Drain Tube Size (Fr): 28 Fr  Chest Tube Drainage System: (c) Dry seal alice...   Number of days: 0       Relevant Results  Lab Review  Results from last 7 days   Lab Units 08/26/24  0520   WBC AUTO x10*3/uL 11.6*   HEMOGLOBIN g/dL 10.3*   HEMATOCRIT % 31.5*   PLATELETS AUTO x10*3/uL 152     Results from last 7 days   Lab Units 08/26/24  0521   SODIUM mmol/L 133*   POTASSIUM mmol/L 3.7   CHLORIDE mmol/L 99   CO2 mmol/L 27   BUN mg/dL 10   CREATININE mg/dL 0.70   CALCIUM mg/dL 8.3*   GLUCOSE mg/dL 150*     Results from last 7 days   Lab Units 08/26/24  0521   MAGNESIUM mg/dL 1.80     Results from last 7 days   Lab Units 08/23/24  2112   POCT PH, ARTERIAL pH 7.34*   POCT PCO2, ARTERIAL mm Hg 43*   POCT PO2, ARTERIAL mm Hg 118*   POCT HCO3 CALCULATED, ARTERIAL mmol/L 23.2   POCT BASE EXCESS, ARTERIAL mmol/L -2.6*         Cardiology Tests       Echo:  Transthoracic Echo (TTE) Complete 07/26/2024  PHYSICIAN INTERPRETATION:  Left Ventricle: Left ventricular ejection fraction is normal, by visual estimate at 55-60%. Wall motion is abnormal. The left ventricular cavity size is normal.  Spectral Doppler shows a normal pattern of left ventricular diastolic filling. The apex is hypokinetic particularly the septal aspect. Inferior aspect of the apex slightly hypokinetic as well.  Left Atrium: The left atrium is mild to moderately dilated.  Right Ventricle: The right ventricle is normal in size. There is normal right ventricular global systolic function.  Right Atrium: The right atrium is normal in size.  Aortic Valve: The aortic valve is trileaflet. The aortic valve dimensionless index is 0.90. There is no evidence of aortic valve regurgitation. The peak instantaneous gradient of the aortic valve is 5.3 mmHg. The mean gradient of the aortic valve is 3.0 mmHg.  Mitral Valve: The mitral valve is normal in structure. There is trace mitral valve regurgitation.  Tricuspid Valve: The tricuspid valve is structurally normal. There is trace to mild tricuspid regurgitation.  Pulmonic Valve: The pulmonic valve is structurally normal. There is trace pulmonic valve regurgitation.  Pericardium: There is no pericardial effusion noted.  Aorta: The aortic root is abnormal. There is mild dilatation of the ascending aorta. There is mild dilatation of the aortic root. Mild aortic root dilatation at 4 cm mild ascending aorta dilatation at 4.1 cm.        CONCLUSIONS:   1. Left ventricular ejection fraction is normal, by visual estimate at 55-60%.   2. The apex is hypokinetic particularly the septal aspect. Inferior aspect of the apex slightly hypokinetic as well.   3. There is normal right ventricular global systolic function.   4. The left atrium is mild to moderately dilated.   5. Mild aortic root dilatation at 4 cm mild ascending aorta dilatation at 4.1 cm.   6. Normal valve function.    Ejection Fractions:  EF   Date/Time Value Ref Range Status   07/26/2024 01:49 PM 58 %      Cath:  Cardiac Catheterization Procedure 07/26/2024  Coronary Angiography:  The coronary circulation is right dominant.     Left Main Coronary  Artery:  The left main coronary artery is free of atherosclerotic disease.     Left Anterior Descending Coronary Artery Distribution:  The Left Anterior Descending artery is a large vessel. There is 90% stenosis in the ostial left anterior descending artery. Hemodynamically significant obstruction is noted in this vessel. There is 100% stenosis in the mid left anterior descending artery.     Circumflex Coronary Artery Distribution:  The Left Circumflex artery is a medium-sized vessel. Hemodynamically significant obstruction is noted in this vessel. There is 90% stenosis in the the ostial Circumflex artery.     Right Coronary Artery Distribution:     The Right Coronary Artery is a large vessel. Hemodynamically significant obstruction is noted in this vessel. There is 75% stenosis in the the proximal Right Coronary Artery. The distal right coronary artery showed 90% stenosis. Posterior Lateral Branch right coronary artery showed 90% stenosis.        Left Ventriculography:  The estimated left ventricular ejection fraction is 50%.  Stress Test:  Nuclear Stress Test 07/11/2024  IMPRESSION:  Suspicion of a prior infarct along the distal anterior and distal  septal wall extending into the apex with evidence of moderate  chris-infarct ischemia along the distal septal and distal anterior  regions.      Left ventricle is enlarged in size      Normal wall motion with post-stress LVEF of 46%             History of Present Illness: Montana Kern is a 44 year old male with PMH HTN, HLD, and NIDDM. Now s/p CABGx3 (LIMA-LAD, SVG-PDA, Radial-OM), LAAL, Ligation Ligament of zahra, and pericardial window with Dr. Zhao on 8/23.     Daily Events    8/24/24: No acute events overnight; patient extubated in the immediate post-op period. No vasopressors, transitioning IV nitrate to PO. Maintaining chest tubes today. Starting on low dose beta blockade.     - Current O2 Requirements: 2L NC      8/25/24: Continues with no acute events  overnight. Chest tube output slowed, plan for removal today. Increasing beta blockade and adding on IV diuresis.     - Current O2 Requirements: RA     8/26/24: Continues with no acute events overnight. Continue on current medical therapy. Plan for discharge to home tomorrow.     Assessment & Plan       Multivessel Coronary Artery Disease  - S/p CABG x 3 on 8/24/24 with Dr. Michael Zhao (LIMA-LAD, RA-OM, SVG-rPDA)  - Continue on ASA 81mg and Atorvastatin 80mg  - Increasing metoprolol tartrate to 25mg BID   - Discontinue chest tubes today       Radial Grafting  - S/p radial harvest site  - Continue on isosorbide dinitrate 10mg TID        Mediastinal Incision  - Well approximated, C/D/I       Acute Post-Op Pain  - As expected   - Multimodal pain control with scheduled magnesium oxide 400mg QD, gabapentin 100mg TID, and acetaminophen 650 q6h (PRN oxycodone & fentanyl)  - Bowel regimen while taking narcotics        Risk for Post-Op Arrhythmia  - Ventricular wires placed  - Discontinue V-wires prior to DC  - Telemetry until discharged       Acute Postoperative Respiratory Insufficiency   - As expected following CABG with post-op atelectasis  - Current O2 Requirements: RA   - Continue coughing and deep breathing exercises.   - Incentive spirometry  - Out of bed, early and aggressive mobilization with assistance  - Oxygen as needed, wean to keep saturation above 92%  - ICU intensivist/pulmonologist consulted  - Albuterol nebulizers as needed       Risk for Fluid Volume Overload  - Pre-Op Weight: 96 kg     8/24: 102 kg (net positive 2717mL)      8/25: 101 kg (net positive 1374mL) --> IV furosemide 20mg BID       8/26: 100 kg (net even) --> IV furosemide 20mg BID    - Strict I& Os and daily weights         Acute Post-Op Blood-Loss Anemia  - Pre-Op H/H: 13.3/40.6     8/24: 10.6/31.8     8/25: 10.8/33     8/26: 10.3/31.5  - Monitor h/h in the initial post op period  - Multivitamin/iron tablet   - Daily CBC while in hospital        Post-Op Leukocytosis  - Pre-Op WBC: 5.5     8/24: 7.6     8/25: 10.1     8/26: 11.6  - Afebrile, consider elevations as reactive to surgery   - Daily cbc while in hospital        Essential HTN  - Home Medications: Metoprolol succinate 50mg every day, lisinopril 40mg  - Restarting BB as above, hold ACE-I        Type 2 DM  - Home Medications: metformin 1g BID, glimepiride 4mg, semaglutide 1mg   - HbA1c: 6.9  - Goal for BG <150 in the post-operative period  - Insulin glargine 12u HS + SSI        Dyslipidemia  - Home Medications: Atoravastatin 80mg   - Continue on statin therapy as above     Risk for Electrolyte Disturbances  - Optimize electrolytes per [facility] protocol      Bowel Regimen  - Senna-S BID   - PRN suppositories and miralax    Prophylaxis  - GI: PPI  - DVT: Julio-Hose & enoxaparin   - MRSA: Negative (8/6)   - PT/OT eval-once clinically stable     Disposition  - CVICU (stepdown)     Patient  plan discussed with Dr. Michael Zhao.      Dexter Sommer, APRN-CNP

## 2024-08-26 NOTE — CONSULTS
"Nutrition Initial Assessment:   Nutrition Assessment    Reason for Assessment: Dietitian discretion (CABG; MST=0)    Patient is a 44 y.o. male presenting with CAD; s/p CABG x3 8/23/24    PmHx: HTN, HLD, DM, CAD    Nutrition History:  Energy Intake: Good > 75 %  Food and Nutrient History: Pt was sleeping in bed at time of attempted visit today. POD#3 CABG X3  Vitamin/Herbal Supplement Use: folic acid, MVI with min, Thiamine  Food Allergies/Intolerances:  None  GI Symptoms:  PAVITHRA  Oral Problems:  PAVITHRA       Anthropometrics:  Height: 175.3 cm (5' 9.02\")   Weight: 100 kg (220 lb 7.4 oz)   BMI (Calculated): 32.54  IBW/kg (Dietitian Calculated): 72.7 kg  Percent of IBW: 137 %       Weight History:     Weight Change %:  Weight History / % Weight Change: 8/25 101kg, 8/24 102kg, 8/23 96kg, 8/6 96kg, 7/29 99kg, 6/14 95.3kg, 1/31 99.8kg,m 12/27 99.8kg, 8/16 99.3kg  Significant Weight Loss: No    Nutrition Focused Physical Exam Findings:  defer: sleeping   Subcutaneous Fat Loss:   Orbital Fat Pads: Defer  Muscle Wasting:     Edema:  Edema: none  Physical Findings:  Skin: Positive (Midsternal incision, L arm x2, R leg x3)    Nutrition Significant Labs:  CBC Trend:   Results from last 7 days   Lab Units 08/26/24  0520 08/25/24  0433 08/24/24  0435 08/23/24 2011   WBC AUTO x10*3/uL 11.6* 10.1 7.6 8.4   RBC AUTO x10*6/uL 3.82* 3.98* 3.88* 3.97*   HEMOGLOBIN g/dL 10.3* 10.8* 10.6* 10.8*   HEMATOCRIT % 31.5* 33.0* 31.8* 32.9*   MCV fL 83 83 82 83   PLATELETS AUTO x10*3/uL 152 147* 130* 133*    , BMP Trend:   Results from last 7 days   Lab Units 08/26/24  0521 08/25/24  0411 08/24/24  0435 08/23/24 2011   GLUCOSE mg/dL 150* 139* 181* 130*   CALCIUM mg/dL 8.3* 8.4* 7.8* 7.8*   SODIUM mmol/L 133* 136 135* 139   POTASSIUM mmol/L 3.7 3.6 3.9 4.0   CO2 mmol/L 27 28 24 24   CHLORIDE mmol/L 99 102 105 109*   BUN mg/dL 10 11 11 9   CREATININE mg/dL 0.70 0.79 0.62 0.60    , A1C:  Lab Results   Component Value Date    HGBA1C 8.0 (A) 06/14/2024 "   , BG POCT trend:   Results from last 7 days   Lab Units 08/26/24  1121 08/26/24  0741 08/26/24  0402 08/25/24  2347 08/25/24  2047   POCT GLUCOSE mg/dL 225* 153* 118* 191* 272*    , Lipid Panel:   Lab Results   Component Value Date    CHOL 153 08/06/2024    HDL 51.3 08/06/2024    CHHDL 3.0 08/06/2024    VLDL 18 08/06/2024    TRIG 88 08/06/2024        Nutrition Specific Medications:  Reviewed     I/O:   Last BM Date: 08/22/24;      Dietary Orders (From admission, onward)       Start     Ordered    08/26/24 1159  Oral nutritional supplements  Until discontinued        Question Answer Comment   Deliver with Breakfast    Select supplement: Ensure High Protein        08/26/24 1158    08/26/24 1159  Adult diet Cardiac, Consistent Carb; CCD 75 gm/meal; 70 gm fat; 2 - 3 grams Sodium  Diet effective now        Question Answer Comment   Diet type Cardiac    Diet type Consistent Carb    Carb diet selection: CCD 75 gm/meal    Fat restriction: 70 gm fat    Sodium restriction: 2 - 3 grams Sodium        08/26/24 1159    08/26/24 1159  Oral nutritional supplements - Harish  Until discontinued        Comments: Twice a day.   Question Answer Comment   Deliver with Lunch    Deliver with Dinner    Select supplement: Harish        08/26/24 1159                     Estimated Needs:      Method for Estimating Needs: 2200-2325kcals (30-32kcals/kg IBW)     Method for Estimating Needs: 87-102g (1.2-1.4g/kg IBW)     Method for Estimating Needs: 1455-1800ml or per MD        Nutrition Diagnosis   Malnutrition Diagnosis  Patient has Malnutrition Diagnosis:  (unable to determine at this time)    Nutrition Diagnosis  Patient has Nutrition Diagnosis: Yes  Diagnosis Status (1): New  Nutrition Diagnosis 1: Increased nutrient needs  Related to (1): physiological causes increasing nutrient needs  As Evidenced by (1): post op wound healing needs       Nutrition Interventions/Recommendations         Nutrition Prescription:  Individualized Nutrition  Prescription Provided for : Will modify diet to Cardiac 75gm carb controlled.  Harish twice daily.  Will order Ensure HP once daily        Nutrition Interventions:   Interventions: Meals and snacks, Medical food supplement  Meals and Snacks: Carbohydrate-modified diet, Mineral-modified diet, Fat-modified diet  Goal: consume >75% of meals  Medical Food Supplement: Commercial beverage  Goal: consume >75% of ENsure HP once daily (for an additional 160 kcals, 16 gm protein each)  Additional Interventions: consume >75% of Harish Twice daily (for an additional 90 kcals, 2.5 gm protein, supplement glutamine and arginine)         Nutrition Education:   Will follow up with pt regarding Cardiac diet prior to discharge        Nutrition Monitoring and Evaluation   Food/Nutrient Related History Monitoring  Monitoring and Evaluation Plan: Energy intake, Fluid intake, Amount of food  Energy Intake: Estimated energy intake  Criteria: Meal/ONS intake to meet >75% of estimated needs  Fluid Intake: Estimated fluid intake  Criteria: fluid intake to meet >75% of estimated need  Amount of Food: Estimated amout of food, Medical food intake  Criteria: Pt to consume >75% of meals/ONS    Body Composition/Growth/Weight History  Monitoring and Evaluation Plan: Weight  Weight: Measured weight  Criteria: weigh daily    Biochemical Data, Medical Tests and Procedures  Monitoring and Evaluation Plan: Electrolyte/renal panel, Glucose/endocrine profile  Criteria: labs WNL  Criteria: BG <180    Nutrition Focused Physical Findings  Monitoring and Evaluation Plan: Digestive System, Skin  Criteria: BM at least every 2-3 days  Criteria: promote healing through adequate nutrition         Time Spent (min): 45 minutes

## 2024-08-27 ENCOUNTER — APPOINTMENT (OUTPATIENT)
Dept: RADIOLOGY | Facility: HOSPITAL | Age: 44
DRG: 236 | End: 2024-08-27
Payer: COMMERCIAL

## 2024-08-27 ENCOUNTER — DOCUMENTATION (OUTPATIENT)
Dept: CARDIOVASCULAR ICU | Facility: HOSPITAL | Age: 44
End: 2024-08-27
Payer: COMMERCIAL

## 2024-08-27 VITALS
HEART RATE: 90 BPM | HEIGHT: 69 IN | RESPIRATION RATE: 18 BRPM | OXYGEN SATURATION: 95 % | DIASTOLIC BLOOD PRESSURE: 75 MMHG | BODY MASS INDEX: 31.7 KG/M2 | WEIGHT: 214 LBS | SYSTOLIC BLOOD PRESSURE: 117 MMHG | TEMPERATURE: 98.8 F

## 2024-08-27 LAB
ALBUMIN SERPL BCP-MCNC: 3.1 G/DL (ref 3.4–5)
ANION GAP SERPL CALC-SCNC: 11 MMOL/L (ref 10–20)
BLOOD EXPIRATION DATE: NORMAL
BUN SERPL-MCNC: 12 MG/DL (ref 6–23)
CALCIUM SERPL-MCNC: 8.3 MG/DL (ref 8.6–10.3)
CHLORIDE SERPL-SCNC: 100 MMOL/L (ref 98–107)
CO2 SERPL-SCNC: 28 MMOL/L (ref 21–32)
CREAT SERPL-MCNC: 0.7 MG/DL (ref 0.5–1.3)
DISPENSE STATUS: NORMAL
EGFRCR SERPLBLD CKD-EPI 2021: >90 ML/MIN/1.73M*2
ERYTHROCYTE [DISTWIDTH] IN BLOOD BY AUTOMATED COUNT: 12.6 % (ref 11.5–14.5)
GLUCOSE BLD MANUAL STRIP-MCNC: 136 MG/DL (ref 74–99)
GLUCOSE BLD MANUAL STRIP-MCNC: 169 MG/DL (ref 74–99)
GLUCOSE BLD MANUAL STRIP-MCNC: 194 MG/DL (ref 74–99)
GLUCOSE BLD MANUAL STRIP-MCNC: 309 MG/DL (ref 74–99)
GLUCOSE SERPL-MCNC: 145 MG/DL (ref 74–99)
HCT VFR BLD AUTO: 30.9 % (ref 41–52)
HGB BLD-MCNC: 10.1 G/DL (ref 13.5–17.5)
MAGNESIUM SERPL-MCNC: 1.9 MG/DL (ref 1.6–2.4)
MCH RBC QN AUTO: 27 PG (ref 26–34)
MCHC RBC AUTO-ENTMCNC: 32.7 G/DL (ref 32–36)
MCV RBC AUTO: 83 FL (ref 80–100)
NRBC BLD-RTO: 0 /100 WBCS (ref 0–0)
PHOSPHATE SERPL-MCNC: 2.8 MG/DL (ref 2.5–4.9)
PLATELET # BLD AUTO: 162 X10*3/UL (ref 150–450)
POTASSIUM SERPL-SCNC: 3.7 MMOL/L (ref 3.5–5.3)
PRODUCT BLOOD TYPE: 5100
PRODUCT CODE: NORMAL
RBC # BLD AUTO: 3.74 X10*6/UL (ref 4.5–5.9)
SODIUM SERPL-SCNC: 135 MMOL/L (ref 136–145)
UNIT ABO: NORMAL
UNIT NUMBER: NORMAL
UNIT RH: NORMAL
UNIT VOLUME: 350
WBC # BLD AUTO: 8.6 X10*3/UL (ref 4.4–11.3)
XM INTEP: NORMAL

## 2024-08-27 PROCEDURE — 2500000004 HC RX 250 GENERAL PHARMACY W/ HCPCS (ALT 636 FOR OP/ED): Performed by: NURSE PRACTITIONER

## 2024-08-27 PROCEDURE — 82374 ASSAY BLOOD CARBON DIOXIDE: CPT | Performed by: NURSE PRACTITIONER

## 2024-08-27 PROCEDURE — 82947 ASSAY GLUCOSE BLOOD QUANT: CPT

## 2024-08-27 PROCEDURE — 71045 X-RAY EXAM CHEST 1 VIEW: CPT

## 2024-08-27 PROCEDURE — 2500000001 HC RX 250 WO HCPCS SELF ADMINISTERED DRUGS (ALT 637 FOR MEDICARE OP): Performed by: NURSE PRACTITIONER

## 2024-08-27 PROCEDURE — 2500000002 HC RX 250 W HCPCS SELF ADMINISTERED DRUGS (ALT 637 FOR MEDICARE OP, ALT 636 FOR OP/ED)

## 2024-08-27 PROCEDURE — 83735 ASSAY OF MAGNESIUM: CPT | Performed by: NURSE PRACTITIONER

## 2024-08-27 PROCEDURE — 2500000004 HC RX 250 GENERAL PHARMACY W/ HCPCS (ALT 636 FOR OP/ED)

## 2024-08-27 PROCEDURE — 85027 COMPLETE CBC AUTOMATED: CPT | Performed by: NURSE PRACTITIONER

## 2024-08-27 PROCEDURE — 36415 COLL VENOUS BLD VENIPUNCTURE: CPT | Performed by: NURSE PRACTITIONER

## 2024-08-27 PROCEDURE — 2500000001 HC RX 250 WO HCPCS SELF ADMINISTERED DRUGS (ALT 637 FOR MEDICARE OP)

## 2024-08-27 PROCEDURE — 71045 X-RAY EXAM CHEST 1 VIEW: CPT | Performed by: RADIOLOGY

## 2024-08-27 RX ORDER — ISOSORBIDE MONONITRATE 30 MG/1
30 TABLET, EXTENDED RELEASE ORAL DAILY
Qty: 30 TABLET | Refills: 11 | Status: SHIPPED | OUTPATIENT
Start: 2024-08-27 | End: 2025-08-27

## 2024-08-27 RX ORDER — INSULIN LISPRO 100 [IU]/ML
0-15 INJECTION, SOLUTION INTRAVENOUS; SUBCUTANEOUS
Status: DISCONTINUED | OUTPATIENT
Start: 2024-08-27 | End: 2024-08-27 | Stop reason: HOSPADM

## 2024-08-27 RX ORDER — OXYCODONE HYDROCHLORIDE 5 MG/1
5 TABLET ORAL EVERY 4 HOURS PRN
Qty: 15 TABLET | Refills: 0 | Status: SHIPPED | OUTPATIENT
Start: 2024-08-27 | End: 2024-08-30

## 2024-08-27 RX ORDER — POTASSIUM CHLORIDE 20 MEQ/1
20 TABLET, EXTENDED RELEASE ORAL ONCE
Status: COMPLETED | OUTPATIENT
Start: 2024-08-27 | End: 2024-08-27

## 2024-08-27 RX ORDER — INSULIN GLARGINE 100 [IU]/ML
12 INJECTION, SOLUTION SUBCUTANEOUS EVERY 24 HOURS
Status: DISCONTINUED | OUTPATIENT
Start: 2024-08-27 | End: 2024-08-27 | Stop reason: HOSPADM

## 2024-08-27 RX ORDER — FUROSEMIDE 20 MG/1
40 TABLET ORAL DAILY
Qty: 10 TABLET | Refills: 0 | Status: SHIPPED | OUTPATIENT
Start: 2024-08-27 | End: 2024-09-01

## 2024-08-27 ASSESSMENT — ENCOUNTER SYMPTOMS
PALPITATIONS: 0
STIFFNESS: 0
IRREGULAR HEARTBEAT: 0
MYALGIAS: 0
VOMITING: 0
SHORTNESS OF BREATH: 0
WEAKNESS: 1
DOUBLE VISION: 0
ORTHOPNEA: 0
BLURRED VISION: 0
LIGHT-HEADEDNESS: 0
COUGH: 0
NAIL CHANGES: 0
NAUSEA: 0
DYSPNEA ON EXERTION: 0
DECREASED APPETITE: 0
POOR WOUND HEALING: 0
FREQUENCY: 0
DIZZINESS: 0
FOCAL WEAKNESS: 0
BLOATING: 0
ALTERED MENTAL STATUS: 0
MUSCLE CRAMPS: 0

## 2024-08-27 ASSESSMENT — PAIN - FUNCTIONAL ASSESSMENT
PAIN_FUNCTIONAL_ASSESSMENT: 0-10

## 2024-08-27 ASSESSMENT — PAIN SCALES - GENERAL
PAINLEVEL_OUTOF10: 0 - NO PAIN

## 2024-08-27 NOTE — PROGRESS NOTES
Subjective   Montana Kern is a 44 y.o. male who presents with No chief complaint on file..    Patient doing well. Pain well controlled. On room air.    Objective   Vitals:    08/27/24 0838   BP: 131/82   Pulse: 94   Resp:    Temp:    SpO2:       Physical Exam  Physical Exam:  Constitutional: obese, awake, alert, no acute distress  ENMT: mucous membranes moist, EOMI, conjunctivae clear  Head/Neck: normocephalic, atraumatic; supple, trachea midline  Respiratory/Thorax: patent airways, incision down sternum   Cardiovascular: RRR, no murmur  Gastrointestinal: soft, nondistended, non-tender, bowel sounds appreciated  Extremities: palpable peripheral pulses, no edema or cyanosis  Neurological: AO x3, no focal deficits  Psychological: appropriate mood and behavior  Skin: warm and dry    Assessment/Plan       Montana Kern is a 44 y.o. male never smoker with history of obesity, DM2, DLD, HTN, nephrolithiasis, ?asthma and multi-vessel CAD presenting status post 3V CABG (LIMA to LAD, SVG to PDA, and RA to OM), LAAL and posterior pericardial window. Critical care consulted for post op ICU management.     Neuro:  - Patient orientated to person place and time  - Sedation: none  - Analgesia: tylenol, dilaudid PRN per CTS  - Maintain normal sleep/wake cycle    Cardiovascular  - Last TTE 7/26/24 showed EF 55-60%  - On ASA and statin. Metoprolol tartrate 50 BID  - Diuresis per CTS. Keep Mg>2, K>4.    Pulmonary:  - Patient extubated on 8/23/24, currently on RA  - Chest tube management per CTS  - Daily CXR  - Encourage incentive spirometer use q1    GI:  - Diet: Cardiac diet  - Prophylaxis: PPI  - No expected issues    Renal:   - No current issues  - James removed  - Trend renal panel, replete electrolytes PRN    Endocrine:  -T2DM   -SSI, maintain BG between 140-180s    Heme/Onc:  - DVT PPX per CTS  - Trend H&H    ID:  - No acute issue    Skin/MSK:  - No acute issues    ICU CHECK LIST:   Antimicrobials: -  Oxygen: RA  Feeding:  cardiac diet  Fluids: -  Analgesia: tylenol, gabapentin, dilaudid PRN  Sedation: -  Thromboprophylaxis: SCDs  Ulcer prophylaxis: PPI  Glycemic control: SSI  Bowel care: chris-colase  Indwelling catheters: -  Lines: PIVs    Code Status: FULL    This is a preliminary note written by the resident. Please wait for attending addendum for finalization of note and recommendations.    Zander Corley DO, PhD  Internal Medicine PGY2

## 2024-08-27 NOTE — PROGRESS NOTES
Cardiac Surgery  Progress Note     Montana Kern is a 44 y.o. male on day 4 of admission presenting with Coronary artery disease involving native coronary artery of native heart.    Subjective     Interval HPI: Seen and assessed patient this AM while they were sitting up in bed. No complaints.     Review of Systems   Constitutional: Negative for decreased appetite and malaise/fatigue.   HENT:  Negative for congestion.    Eyes:  Negative for blurred vision and double vision.   Cardiovascular:  Negative for chest pain, dyspnea on exertion, irregular heartbeat, leg swelling, orthopnea and palpitations.   Respiratory:  Negative for cough and shortness of breath.    Endocrine: Negative for cold intolerance and heat intolerance.   Skin:  Negative for nail changes, poor wound healing and rash.   Musculoskeletal:  Negative for arthritis, muscle cramps, muscle weakness, myalgias and stiffness.   Gastrointestinal:  Negative for bloating, nausea and vomiting.   Genitourinary:  Negative for frequency, hesitancy and urgency.   Neurological:  Positive for weakness. Negative for dizziness, focal weakness and light-headedness.   Psychiatric/Behavioral:  Negative for altered mental status.    Allergic/Immunologic: Negative for environmental allergies.         Objective   Physical Exam  Physical Exam  Vitals and nursing note reviewed.   Constitutional:       General: He is awake. He is not in acute distress.     Appearance: Normal appearance. He is not ill-appearing or diaphoretic.      Interventions: Nasal cannula in place.   HENT:      Head: Normocephalic and atraumatic.      Nose: Nose normal.      Mouth/Throat:      Mouth: Mucous membranes are moist.      Pharynx: Oropharynx is clear. No oropharyngeal exudate.   Eyes:      Extraocular Movements: Extraocular movements intact.      Conjunctiva/sclera: Conjunctivae normal.      Pupils: Pupils are equal, round, and reactive to light.   Cardiovascular:      Rate and Rhythm: Normal  rate and regular rhythm.      Pulses: Normal pulses.      Heart sounds: Normal heart sounds, S1 normal and S2 normal. No murmur heard.     No systolic murmur is present.      No friction rub. No gallop.   Pulmonary:      Effort: Pulmonary effort is normal. No tachypnea, bradypnea or respiratory distress.      Breath sounds: Normal breath sounds.   Chest:      Chest wall: Tenderness present.   Abdominal:      General: Abdomen is flat. Bowel sounds are normal. There is no distension.      Palpations: Abdomen is soft.   Musculoskeletal:         General: Normal range of motion.      Cervical back: Normal range of motion and neck supple. No edema.      Right lower leg: No edema.      Left lower leg: No edema.   Skin:     General: Skin is warm and dry.      Capillary Refill: Capillary refill takes less than 2 seconds.      Findings: No lesion.      Nails: There is no clubbing.      Comments: Mediastinal incision PAMELA, no drainage or erythema   Neurological:      General: No focal deficit present.      Mental Status: He is alert and oriented to person, place, and time. Mental status is at baseline.      GCS: GCS eye subscore is 1. GCS verbal subscore is 1. GCS motor subscore is 1.      Cranial Nerves: Cranial nerves 2-12 are intact.      Sensory: Sensation is intact.      Motor: Motor function is intact.   Psychiatric:         Attention and Perception: Attention and perception normal.         Mood and Affect: Mood normal.         Speech: Speech normal.         Behavior: Behavior normal. Behavior is cooperative.         Thought Content: Thought content normal.         Cognition and Memory: Cognition and memory normal.         Judgment: Judgment normal.         Last Recorded Vitals  Vitals:    08/27/24 0500 08/27/24 0600 08/27/24 0756 08/27/24 0838   BP: 121/64   131/82   BP Location:       Patient Position:       Pulse: 88 101  94   Resp:       Temp:       TempSrc:       SpO2:       Weight:  97.1 kg (214 lb) 97.1 kg (214 lb)     Height:            Intake/Output last 3 Shifts:  I/O last 3 completed shifts:  In: 50 (0.5 mL/kg) [I.V.:50 (0.5 mL/kg)]  Out: 2350 (24.2 mL/kg) [Urine:2350 (0.7 mL/kg/hr)]  Weight: 97.1 kg     Inpatient Medications  acetaminophen, 975 mg, oral, q8h  aspirin, 81 mg, oral, Daily  atorvastatin, 80 mg, oral, Nightly  folic acid, 1 mg, oral, Daily  furosemide, 20 mg, intravenous, BID  gabapentin, 100 mg, oral, TID  insulin glargine, 12 Units, subcutaneous, q24h  insulin lispro, 0-15 Units, subcutaneous, q4h  isosorbide dinitrate, 10 mg, oral, TID  metoprolol tartrate, 50 mg, oral, BID  multivitamin with minerals, 1 tablet, oral, Daily  pantoprazole, 40 mg, oral, Daily before breakfast  polyethylene glycol, 17 g, oral, Daily  potassium chloride CR, 20 mEq, oral, Once  sennosides-docusate sodium, 2 tablet, oral, BID  thiamine, 100 mg, oral, Daily      Continuous medications  lactated Ringer's, 5 mL/hr, Last Rate: 5 mL/hr (08/25/24 0616)      PRN medications  PRN medications: alteplase, calcium gluconate, calcium gluconate, dextrose **OR** glucagon, HYDROmorphone, HYDROmorphone, magnesium sulfate, magnesium sulfate, naloxone, ondansetron ODT **OR** ondansetron, oxyCODONE, oxyCODONE, potassium chloride, potassium chloride     LDA:  CVC 08/23/24 Triple lumen Right Internal jugular (Active)   Placement Date/Time: 08/23/24 (c) 1242   Hand Hygiene Performed Prior to CVC Insertion: Yes  Site Prep: Chlorhexidine   Site Prep Agent has Completely Dried Before Insertion: Yes  All 5 Sterile Barriers Used (Gloves, Gown, Cap, Mask, Large Sterile Jossy...   Number of days: 1       Urethral Catheter Temperature probe;Straight-tip 16 Fr. (Active)   Placement Date/Time: 08/23/24 1240   Placed by: IMELDA KESSLER  Hand Hygiene Completed: Yes  Catheter Type: Temperature probe;Straight-tip  Tube Size (Fr.): 16 Fr.  Catheter Balloon Size: 10 mL  Urine Returned: Yes   Number of days: 1       Chest Tube 1 Left Pleural 28 Fr (Active)   Placement  Date/Time: 08/23/24 1757   Placed by: DR. MCCOY  Hand Hygiene Completed: Yes  Tube Number: 1  Chest Tube Orientation: Left  Chest Tube Location: Pleural  Chest Tube Drain Tube Size (Fr): 28 Fr  Chest Tube Drainage System: (c) Dry seal ches...   Number of days: 0       Chest Tube 2 Mediastinal 28 Fr (Active)   Placement Date/Time: 08/23/24 1759   Placed by: DR. MCCOY  Hand Hygiene Completed: Yes  Tube Number: 2  Chest Tube Location: Mediastinal  Chest Tube Drain Tube Size (Fr): 28 Fr  Chest Tube Drainage System: (c) Dry seal chest drain   Number of days: 0       Chest Tube 3 Right Pleural 28 Fr (Active)   Placement Date/Time: 08/23/24 1759   Placed by: DR. MCCOY  Hand Hygiene Completed: Yes  Tube Number: 3  Chest Tube Orientation: Right  Chest Tube Location: Pleural  Chest Tube Drain Tube Size (Fr): 28 Fr  Chest Tube Drainage System: (c) Dry seal alice...   Number of days: 0       Relevant Results  Lab Review  Results from last 7 days   Lab Units 08/27/24  0532   WBC AUTO x10*3/uL 8.6   HEMOGLOBIN g/dL 10.1*   HEMATOCRIT % 30.9*   PLATELETS AUTO x10*3/uL 162     Results from last 7 days   Lab Units 08/27/24  0532   SODIUM mmol/L 135*   POTASSIUM mmol/L 3.7   CHLORIDE mmol/L 100   CO2 mmol/L 28   BUN mg/dL 12   CREATININE mg/dL 0.70   CALCIUM mg/dL 8.3*   GLUCOSE mg/dL 145*     Results from last 7 days   Lab Units 08/27/24  0532   MAGNESIUM mg/dL 1.90     Results from last 7 days   Lab Units 08/23/24  2112   POCT PH, ARTERIAL pH 7.34*   POCT PCO2, ARTERIAL mm Hg 43*   POCT PO2, ARTERIAL mm Hg 118*   POCT HCO3 CALCULATED, ARTERIAL mmol/L 23.2   POCT BASE EXCESS, ARTERIAL mmol/L -2.6*         Cardiology Tests       Echo:  Transthoracic Echo (TTE) Complete 07/26/2024  PHYSICIAN INTERPRETATION:  Left Ventricle: Left ventricular ejection fraction is normal, by visual estimate at 55-60%. Wall motion is abnormal. The left ventricular cavity size is normal. Spectral Doppler shows a normal pattern of left ventricular  diastolic filling. The apex is hypokinetic particularly the septal aspect. Inferior aspect of the apex slightly hypokinetic as well.  Left Atrium: The left atrium is mild to moderately dilated.  Right Ventricle: The right ventricle is normal in size. There is normal right ventricular global systolic function.  Right Atrium: The right atrium is normal in size.  Aortic Valve: The aortic valve is trileaflet. The aortic valve dimensionless index is 0.90. There is no evidence of aortic valve regurgitation. The peak instantaneous gradient of the aortic valve is 5.3 mmHg. The mean gradient of the aortic valve is 3.0 mmHg.  Mitral Valve: The mitral valve is normal in structure. There is trace mitral valve regurgitation.  Tricuspid Valve: The tricuspid valve is structurally normal. There is trace to mild tricuspid regurgitation.  Pulmonic Valve: The pulmonic valve is structurally normal. There is trace pulmonic valve regurgitation.  Pericardium: There is no pericardial effusion noted.  Aorta: The aortic root is abnormal. There is mild dilatation of the ascending aorta. There is mild dilatation of the aortic root. Mild aortic root dilatation at 4 cm mild ascending aorta dilatation at 4.1 cm.        CONCLUSIONS:   1. Left ventricular ejection fraction is normal, by visual estimate at 55-60%.   2. The apex is hypokinetic particularly the septal aspect. Inferior aspect of the apex slightly hypokinetic as well.   3. There is normal right ventricular global systolic function.   4. The left atrium is mild to moderately dilated.   5. Mild aortic root dilatation at 4 cm mild ascending aorta dilatation at 4.1 cm.   6. Normal valve function.    Ejection Fractions:  EF   Date/Time Value Ref Range Status   07/26/2024 01:49 PM 58 %      Cath:  Cardiac Catheterization Procedure 07/26/2024  Coronary Angiography:  The coronary circulation is right dominant.     Left Main Coronary Artery:  The left main coronary artery is free of  atherosclerotic disease.     Left Anterior Descending Coronary Artery Distribution:  The Left Anterior Descending artery is a large vessel. There is 90% stenosis in the ostial left anterior descending artery. Hemodynamically significant obstruction is noted in this vessel. There is 100% stenosis in the mid left anterior descending artery.     Circumflex Coronary Artery Distribution:  The Left Circumflex artery is a medium-sized vessel. Hemodynamically significant obstruction is noted in this vessel. There is 90% stenosis in the the ostial Circumflex artery.     Right Coronary Artery Distribution:     The Right Coronary Artery is a large vessel. Hemodynamically significant obstruction is noted in this vessel. There is 75% stenosis in the the proximal Right Coronary Artery. The distal right coronary artery showed 90% stenosis. Posterior Lateral Branch right coronary artery showed 90% stenosis.        Left Ventriculography:  The estimated left ventricular ejection fraction is 50%.  Stress Test:  Nuclear Stress Test 07/11/2024  IMPRESSION:  Suspicion of a prior infarct along the distal anterior and distal  septal wall extending into the apex with evidence of moderate  chris-infarct ischemia along the distal septal and distal anterior  regions.      Left ventricle is enlarged in size      Normal wall motion with post-stress LVEF of 46%         History of Present Illness: [place] Kern is a 44 year old male with PMH HTN, HLD, and NIDDM. Now s/p CABGx3 (LIMA-LAD, SVG-PDA, Radial-OM), LAAL, Ligation Ligament of zahra, and pericardial window with Dr. Zhao on 8/23.     Daily Events    8/24/24: No acute events overnight; patient extubated in the immediate post-op period. No vasopressors, transitioning IV nitrate to PO. Maintaining chest tubes today. Starting on low dose beta blockade.     - Current O2 Requirements: 2L NC      8/25/24: Continues with no acute events overnight. Chest tube output slowed, plan for removal  today. Increasing beta blockade and adding on IV diuresis.     - Current O2 Requirements: RA     8/26/24: Continues with no acute events overnight. Continue on current medical therapy. Plan for discharge to home tomorrow.     8/27/24: No acute overnight events. Plan for discharge home today.     Assessment & Plan       Multivessel Coronary Artery Disease  - S/p CABG x 3 on 8/24/24 with Dr. Michael Zhao (LIMA-LAD, RA-OM, SVG-rPDA)  - Continue on ASA 81mg and Atorvastatin 80mg  - Increasing metoprolol tartrate to 50mg BID        Radial Grafting  - S/p radial harvest site  - Continue on isosorbide dinitrate 10mg TID        Mediastinal Incision  - Well approximated, C/D/I       Acute Post-Op Pain  - As expected   - Multimodal pain control with scheduled magnesium oxide 400mg QD, gabapentin 100mg TID, and acetaminophen 650 q6h (PRN oxycodone & fentanyl)  - Bowel regimen while taking narcotics        Risk for Post-Op Arrhythmia  - Ventricular wires placed, wires cut at skin 8/27       Acute Postoperative Respiratory Insufficiency   - As expected following CABG with post-op atelectasis  - Current O2 Requirements: RA   - Continue coughing and deep breathing exercises.   - Incentive spirometry  - Out of bed, early and aggressive mobilization with assistance  - Oxygen as needed, wean to keep saturation above 92%  - ICU intensivist/pulmonologist consulted  - Albuterol nebulizers as needed       Risk for Fluid Volume Overload  - Pre-Op Weight: 96 kg     8/24: 102 kg (net positive 2717mL)      8/25: 101 kg (net positive 1374mL) --> IV furosemide 20mg BID       8/26: 100 kg (net even) --> IV furosemide 20mg BID       8/27: 97.11 kg -- Continue lasix 20 mg IV BID  - Strict I& Os and daily weights         Acute Post-Op Blood-Loss Anemia  - Pre-Op H/H: 13.3/40.6     8/24: 10.6/31.8     8/25: 10.8/33     8/26: 10.3/31.5     8/27: 10.1/30.9  - Monitor h/h in the initial post op period  - Multivitamin/iron tablet   - Daily CBC while in  hospital       Post-Op Leukocytosis  - Pre-Op WBC: 5.5     8/24: 7.6     8/25: 10.1     8/26: 11.6     8/27: 8.6  - Afebrile, consider elevations as reactive to surgery   - Daily cbc while in hospital        Essential HTN  - Home Medications: Metoprolol succinate 50mg every day, lisinopril 40mg  - Restarting BB as above, hold ACE-I until discharge       Type 2 DM  - Home Medications: metformin 1g BID, glimepiride 4mg, semaglutide 1mg   - HbA1c: 6.9  - Goal for BG <150 in the post-operative period  - To resume home regimen starting 8/28. Instructed to monitor blood glucose multiple times a week. To be managed by PCP.        Dyslipidemia  - Home Medications: Atoravastatin 80mg   - Continue on statin therapy as above     Risk for Electrolyte Disturbances  - Optimize electrolytes per [facility] protocol      Bowel Regimen  - Senna-S BID   - PRN suppositories and miralax    Prophylaxis  - GI: PPI  - DVT: Julio-Hose & enoxaparin   - MRSA: Negative (8/6)   - PT/OT eval-once clinically stable     Disposition  - CVICU (stepdown)     Patient plan discussed with Dr. Michael Zhao.      Deepa Peterson, APRN-CNP

## 2024-08-27 NOTE — NURSING NOTE
Cardiac Rehab - Phase I    Discipline: Nursing     Topic: Cardiac Rehab    Description:   Cardiac Rehabilitation Education done by cardiopulmonary rehabilitation staff with good understanding:  Yes   Qualifying cardiac diagnosis/procedure: yes  Addressed patient's questions regarding cardiac diagnosis/procedure: yes    Healthy nutrition education done: yes  Cardiac Healthy lifestyle education done: yes  Does patient meet criteria for program enrollment: yes  Benefits of participation in Cardiac Rehabilitation program discussed with patient: yes  Is patient interested in attending Cardiac Rehabilitation: yes, but closer to home.  Does patient have transportation to attend the program: yes  Cardiac Rehabilitation pamphlet given to patient: yes  Insurance coverage verification process explained to patient: yes  Initial interview scheduled: No, pt aware that initial interview will be scheduled after discharge.  Contact information for [facility] given to patient: yes    Comments: 174-1  UpToDate Patient Education Handouts Provided:  - Cardiac Rehab Information  - Heart Healthy Diet  - Coronary Artery Bypass Graft Surgery

## 2024-08-27 NOTE — PROGRESS NOTES
Met with patient at bedside for C choices.  Patient's preference is [facility].  Referral sent.  Care Transitions will continue to follow.    9:22 am Addendum  Reviewed CarePort for updates.  [facility] is able to accept patient.  Updated with PCP and disciplines.  Will send clinical notes and FHCO when available.  Nursing and provider notified.  Care Transitions will continue to follow.    9:37 am Addendum  FHCO sent to [facility] via CarePort.  Care Transitions will continue to follow.    11:15 am Addendum  Reviewed CarePort for update.  [facility] requested clinicals.  Re-sent all clinicals as requested.  Patient updated [facility] is able to accept and SOC within 24-48 hours of discharge.  Care Transitions will continue to follow.

## 2024-08-27 NOTE — CARE PLAN
The clinical goals for the shift include pt will remain hemodynamically stable    Problem: ACS/CP/NSTEMI/STEMI  Goal: Chest pain managed (free from pain or at acceptable level)  Outcome: Progressing  Goal: Lab values return to normal range  Outcome: Progressing  Goal: Promote self management  Outcome: Progressing  Goal: Serial ECG will return to baseline  Outcome: Progressing     Problem: Hypertensive Emergency/Crisis  Goal: Blood pressure gradually reduced to goal range  Outcome: Progressing  Goal: Promote self management  Outcome: Progressing     Problem: Pain - Adult  Goal: Verbalizes/displays adequate comfort level or baseline comfort level  Outcome: Progressing     Problem: Fall/Injury  Goal: Verbalize understanding of personal risk factors for fall in the hospital  Outcome: Progressing  Goal: Verbalize understanding of risk factor reduction measures to prevent injury from fall in the home  Outcome: Progressing  Goal: Pace activities to prevent fatigue by end of the shift  Outcome: Progressing     Problem: Safety - Adult  Goal: Free from fall injury  Outcome: Progressing     Problem: Discharge Planning  Goal: Discharge to home or other facility with appropriate resources  Outcome: Progressing     Problem: Chronic Conditions and Co-morbidities  Goal: Patient's chronic conditions and co-morbidity symptoms are monitored and maintained or improved  Outcome: Progressing     Problem: Diabetes  Goal: Achieve decreasing blood glucose levels by end of shift  Outcome: Progressing  Goal: Increase stability of blood glucose readings by end of shift  Outcome: Progressing  Goal: Decrease in ketones present in urine by end of shift  Outcome: Progressing  Goal: Maintain electrolyte levels within acceptable range throughout shift  Outcome: Progressing  Goal: Maintain glucose levels >70mg/dl to <250mg/dl throughout shift  Outcome: Progressing  Goal: No changes in neurological exam by end of shift  Outcome: Progressing  Goal:  Learn about and adhere to nutrition recommendations by end of shift  Outcome: Progressing  Goal: Vital signs within normal range for age by end of shift  Outcome: Progressing  Goal: Increase self care and/or family involovement by end of shift  Outcome: Progressing  Goal: Receive DSME education by end of shift  Outcome: Progressing

## 2024-08-27 NOTE — NURSING NOTE
Cardiac and CHF discharge instructions reviewed with patient and family.      Discussed wound care to area: No concern for infection present upon visual nursing assessment. Aware to leave PAMELA and cleanse with mild soap and water daily, pat dry. Discussed monitoring daily weights, BP, and HR. adequate nutrition and hydration intake. Also discussed utilizing incentive spirometer and increasing protein and vitamin C intake. Recommended High Protein Ensure. Medication assessment performed. States pain currently well controlled with regimen ordered. Advised to call me as needed with questions or concerns. Patient verbalized understanding.     Other Topics reviewed:   Incision care: discussed signs and symptoms of infection  Medication Education : Lasix, BB, ASA, Statin, ACE, Isosorbide.   Driving restrictions: No driving for 30 days (or until cleared by Dr. Zhao)   Cardiac Rehab: To be arranged by Cardiology once surgeon clears patient from surgery.   Home Health- advised to call me if HC does not call after discharge.  Doctor appointments: Aware of CXR needed prior office visit with surgeon.   Diet: Advised to adhere to a heart healthy diet, regular exercise habits, avoidance of tobacco products, and maintenance of a healthy weight as they are crucial components to heart disease risk reduction.   BP/WT/HR: Daily monitoring of weight and BP. Made aware of when to call.      Epicardial wires removed by Cristobal Peterson CNP.   Cardiac Rehab information given to patient.   Recovery after coronary artery bypass surgery  - Heart attack recovery

## 2024-08-27 NOTE — CARE PLAN
Problem: Pain - Adult  Goal: Verbalizes/displays adequate comfort level or baseline comfort level  Outcome: Met     Problem: Safety - Adult  Goal: Free from fall injury  Outcome: Met     Problem: Discharge Planning  Goal: Discharge to home or other facility with appropriate resources  Outcome: Met     Problem: Chronic Conditions and Co-morbidities  Goal: Patient's chronic conditions and co-morbidity symptoms are monitored and maintained or improved  Outcome: Met   The patient's goals for the shift include      The clinical goals for the shift include pt to be discharged    PT discharged

## 2024-08-27 NOTE — DISCHARGE SUMMARY
Discharge Diagnosis  Coronary artery disease involving native coronary artery of native heart    Issues Requiring Follow-Up  Glucose and blood pressure monitoring.    Test Results Pending At Discharge  Pending Labs       No current pending labs.            Hospital Course   Daily Events     8/24/24: No acute events overnight; patient extubated in the immediate post-op period. No vasopressors, transitioning IV nitrate to PO. Maintaining chest tubes today. Starting on low dose beta blockade.      - Current O2 Requirements: 2L NC       8/25/24: Continues with no acute events overnight. Chest tube output slowed, plan for removal today. Increasing beta blockade and adding on IV diuresis.      - Current O2 Requirements: RA      8/26/24: Continues with no acute events overnight. Continue on current medical therapy. Plan for discharge to home tomorrow.      8/27/24: No acute overnight events. Plan for discharge home today.      Assessment & Plan        Multivessel Coronary Artery Disease  - S/p CABG x 3 on 8/24/24 with Dr. Michael Zhao (LIMA-LAD, RA-OM, SVG-rPDA)  - Continue on ASA 81mg and Atorvastatin 80mg  - Increasing metoprolol tartrate to 50mg BID         Radial Grafting  - S/p radial harvest site  - Continue on isosorbide dinitrate 10mg TID         Mediastinal Incision  - Well approximated, C/D/I        Acute Post-Op Pain  - As expected   - Multimodal pain control with scheduled magnesium oxide 400mg QD, gabapentin 100mg TID, and acetaminophen 650 q6h (PRN oxycodone & fentanyl)  - Bowel regimen while taking narcotics         Risk for Post-Op Arrhythmia  - Ventricular wires placed, wires cut at skin 8/27        Acute Postoperative Respiratory Insufficiency   - As expected following CABG with post-op atelectasis  - Current O2 Requirements: RA   - Continue coughing and deep breathing exercises.   - Incentive spirometry  - Out of bed, early and aggressive mobilization with assistance  - Oxygen as needed, wean to keep  saturation above 92%  - ICU intensivist/pulmonologist consulted  - Albuterol nebulizers as needed        Risk for Fluid Volume Overload  - Pre-Op Weight: 96 kg     8/24: 102 kg (net positive 2717mL)      8/25: 101 kg (net positive 1374mL) --> IV furosemide 20mg BID       8/26: 100 kg (net even) --> IV furosemide 20mg BID       8/27: 97.11 kg -- Continue lasix 20 mg IV BID  - Strict I& Os and daily weights          Acute Post-Op Blood-Loss Anemia  - Pre-Op H/H: 13.3/40.6     8/24: 10.6/31.8     8/25: 10.8/33     8/26: 10.3/31.5     8/27: 10.1/30.9  - Monitor h/h in the initial post op period  - Multivitamin/iron tablet   - Daily CBC while in hospital        Post-Op Leukocytosis  - Pre-Op WBC: 5.5     8/24: 7.6     8/25: 10.1     8/26: 11.6     8/27: 8.6  - Afebrile, consider elevations as reactive to surgery   - Daily cbc while in hospital         Essential HTN  - Home Medications: Metoprolol succinate 50mg every day, lisinopril 40mg  - Restarting BB as above, hold ACE-I until discharge        Type 2 DM  - Home Medications: metformin 1g BID, glimepiride 4mg, semaglutide 1mg   - HbA1c: 6.9  - Goal for BG <150 in the post-operative period  - To resume home regimen starting 8/28. Instructed to monitor blood glucose multiple times a week. To be managed by PCP.         Dyslipidemia  - Home Medications: Atoravastatin 80mg   - Continue on statin therapy as above      Risk for Electrolyte Disturbances  - Optimize electrolytes per [facility] protocol       Bowel Regimen  - Senna-S BID   - PRN suppositories and miralax     Prophylaxis  - GI: PPI  - DVT: Julio-Hose & enoxaparin   - MRSA: Negative (8/6)   - PT/OT eval-once clinically stable        Pertinent Physical Exam At Time of Discharge  Physical Exam  Constitutional:       General: He is not in acute distress.     Appearance: Normal appearance.   HENT:      Mouth/Throat:      Mouth: Mucous membranes are moist.   Eyes:      Conjunctiva/sclera: Conjunctivae normal.    Cardiovascular:      Rate and Rhythm: Normal rate and regular rhythm.      Pulses: Normal pulses.      Heart sounds: Normal heart sounds.   Pulmonary:      Effort: Pulmonary effort is normal.      Breath sounds: Normal breath sounds.   Abdominal:      General: Bowel sounds are normal.      Palpations: Abdomen is soft.   Musculoskeletal:      Right lower leg: No edema.      Left lower leg: No edema.   Skin:     General: Skin is warm and dry.      Capillary Refill: Capillary refill takes less than 2 seconds.      Comments: MSI open to air, no s/s infection.   Chest tube sites with steri strips  Left arterial grafting sites open to air, clean, dry and intact.    Neurological:      General: No focal deficit present.      Mental Status: He is alert.         Home Medications     Medication List      START taking these medications     furosemide 20 mg tablet; Commonly known as: Lasix; Take 2 tablets (40   mg) by mouth once daily for 5 days.   isosorbide mononitrate ER 30 mg 24 hr tablet; Commonly known as: Imdur;   Take 1 tablet (30 mg) by mouth once daily. Do not crush or chew.   oxyCODONE 5 mg immediate release tablet; Commonly known as: Roxicodone;   Take 1 tablet (5 mg) by mouth every 4 hours if needed for moderate pain (4   - 6) or severe pain (7 - 10) for up to 3 days.     CHANGE how you take these medications     gabapentin 300 mg capsule; Commonly known as: Neurontin; Take 1 capsule   (300 mg) by mouth 3 times a day.; What changed: when to take this, Another   medication with the same name was removed. Continue taking this   medication, and follow the directions you see here.     CONTINUE taking these medications     aspirin 81 mg EC tablet   atorvastatin 80 mg tablet; Commonly known as: Lipitor; Take 1 tablet (80   mg) by mouth once daily.   glimepiride 4 mg tablet; Commonly known as: Amaryl; TAKE 1/2 TABLET BY   MOUTH TWICE DAILY BEFORE A MEAL AND 1 TABLET BY MOUTH BEFORE DINNER   lisinopril 40 mg tablet; Take  1 tablet (40 mg) by mouth once daily.   metFORMIN 1,000 mg tablet; Commonly known as: Glucophage; Take 1 tablet   (1,000 mg) by mouth 2 times a day with meals.   metoprolol succinate XL 50 mg 24 hr tablet; Commonly known as: Toprol-XL   Ozempic 1 mg/dose (4 mg/3 mL) pen injector; Generic drug: semaglutide;   Inject 1 mg under the skin 1 (one) time per week. Wednesday     STOP taking these medications     chlorhexidine 0.12 % solution; Commonly known as: Peridex   nitroglycerin 0.4 mg SL tablet; Commonly known as: Nitrostat       Outpatient Follow-Up  Future Appointments   Date Time Provider Department Center   8/28/2024 12:00 AM PAR X-[facility]   9/12/2024 11:45 AM PAR [id number] [place]       JONH Kelly-CNP

## 2024-08-28 ENCOUNTER — PATIENT OUTREACH (OUTPATIENT)
Dept: PRIMARY CARE | Facility: CLINIC | Age: 44
End: 2024-08-28
Payer: COMMERCIAL

## 2024-08-28 DIAGNOSIS — Z95.1 S/P CABG X 3: ICD-10-CM

## 2024-08-28 NOTE — PROGRESS NOTES
Discharge Facility: [facility]   Discharge Diagnosis:  Coronary artery disease involving native coronary artery of native heart   Issues Requiring Follow-Up  Glucose and blood pressure monitoring.  CABG x 3 on 8/24/24 with Dr. Michael Zhao   Admission Date:  8/23/24  Discharge Date: 8/27/24    PCP Appointment Date:  Leonardo Muller DO TBD/tasked to office                                                  Specialist Appointment Date: Cardiac Surg Dr Zhao 9/25/24  Hospital Encounter and Summary Linked: Yes  See discharge assessment below for further details     Engagement  Call Start Time: 1050 (This CM spoke with wife Carey) (8/28/2024 10:55 AM)    Medications  Medications reviewed with patient/caregiver?: Yes (8/28/2024 10:55 AM)  Is the patient having any side effects they believe may be caused by any medication additions or changes?: No (8/28/2024 10:55 AM)  Does the patient have all medications ordered at discharge?: Yes (8/28/2024 10:55 AM)  Care Management Interventions: No intervention needed (8/28/2024 10:55 AM)  Prescription Comments: START taking these medications    o furosemide 20 mg tablet; Commonly known as: Lasix; Take 2 tablets (40   mg) by mouth once daily for 5 days.  o isosorbide mononitrate ER 30 mg 24 hr tablet; Commonly known as: Imdur;   Take 1 tablet (30 mg) by mouth once daily. Do not crush or chew.  o oxyCODONE 5 mg immediate release tablet; Commonly known as: Roxicodone;   Take 1 tablet (5 mg) by mouth every 4 hours if needed for moderate pain (4   - 6) or severe pain (7 - 10) for up to 3 days. (8/28/2024 10:55 AM)  Is the patient taking all medications as directed (includes completed medication regime)?: Yes (8/28/2024 10:55 AM)  Care Management Interventions: Provided patient education (8/28/2024 10:55 AM)  Medication Comments: Wife states not all medications were ready at the pharmacy and will be picking up medications as soon as they are prepared (8/28/2024 10:55  AM)    Appointments  Does the patient have a primary care provider?: Yes (8/28/2024 10:55 AM)  Care Management Interventions: Educated patient on importance of making appointment; Advised patient to make appointment (Leonardo Muller, DO TBD/tasked to office) (8/28/2024 10:55 AM)  Has the patient kept scheduled appointments due by today?: Yes (8/28/2024 10:55 AM)  Care Management Interventions: Advised patient to keep appointment (8/28/2024 10:55 AM)    Self Management  [facility]. (8/28/2024 10:55 AM)  Has home health visited the patient within 72 hours of discharge?: Call prior to 72 hours (8/28/2024 10:55 AM)  What Durable Medical Equipment (DME) was ordered?: denies need (8/28/2024 10:55 AM)    Patient Teaching  Does the patient have access to their discharge instructions?: Yes (8/28/2024 10:55 AM)  Care Management Interventions: Reviewed instructions with patient; Educated on MyChart (8/28/2024 10:55 AM)  What is the patient's perception of their health status since discharge?: Improving (8/28/2024 10:55 AM)  Is the patient/caregiver able to teach back the hierarchy of who to call/visit for symptoms/problems? PCP, Specialist, Home Health nurse, Urgent Care, ED, 911: Yes (8/28/2024 10:55 AM)  Patient/Caregiver Education Comments: CM discussed referencing discharge paperwork to follow detailed daily medication schedule (8/28/2024 10:55 AM)    Wrap Up  Wrap Up Additional Comments: This CM spoke with pts wife, Carey via phone. Wife reports pt doing well at home since discharge. New meds reviewed. Carey reports they have picked up almost  all new meds and have no questions at this time.Wife aware of my availability for non-emergent concerns. Contact info provided. (8/28/2024 10:55 AM)

## 2024-09-03 LAB
ATRIAL RATE: 88 BPM
ATRIAL RATE: 88 BPM
ATRIAL RATE: 90 BPM
DIASTOLIC BLOOD PRESSURE: 70 MMHG
DIASTOLIC BLOOD PRESSURE: 71 MMHG
DIASTOLIC BLOOD PRESSURE: 72 MMHG
P AXIS: 44 DEGREES
P AXIS: 69 DEGREES
P AXIS: 70 DEGREES
P OFFSET: 182 MS
P OFFSET: 187 MS
P OFFSET: 191 MS
P ONSET: 128 MS
P ONSET: 130 MS
P ONSET: 138 MS
PR INTERVAL: 148 MS
PR INTERVAL: 166 MS
PR INTERVAL: 170 MS
Q ONSET: 212 MS
Q ONSET: 213 MS
Q ONSET: 213 MS
QRS COUNT: 14 BEATS
QRS COUNT: 14 BEATS
QRS COUNT: 15 BEATS
QRS DURATION: 90 MS
QRS DURATION: 90 MS
QRS DURATION: 94 MS
QT INTERVAL: 350 MS
QT INTERVAL: 358 MS
QT INTERVAL: 366 MS
QTC CALCULATION(BAZETT): 428 MS
QTC CALCULATION(BAZETT): 433 MS
QTC CALCULATION(BAZETT): 442 MS
QTC FREDERICIA: 400 MS
QTC FREDERICIA: 406 MS
QTC FREDERICIA: 416 MS
R AXIS: 47 DEGREES
R AXIS: 63 DEGREES
R AXIS: 73 DEGREES
SYSTOLIC BLOOD PRESSURE: 107 MMHG
SYSTOLIC BLOOD PRESSURE: 117 MMHG
SYSTOLIC BLOOD PRESSURE: 120 MMHG
T AXIS: -13 DEGREES
T AXIS: 18 DEGREES
T AXIS: 24 DEGREES
T OFFSET: 388 MS
T OFFSET: 391 MS
T OFFSET: 396 MS
VENTRICULAR RATE: 88 BPM
VENTRICULAR RATE: 88 BPM
VENTRICULAR RATE: 90 BPM

## 2024-09-04 DIAGNOSIS — E11.9 TYPE 2 DIABETES MELLITUS TREATED WITHOUT INSULIN (MULTI): Primary | ICD-10-CM

## 2024-09-09 ENCOUNTER — APPOINTMENT (OUTPATIENT)
Dept: PRIMARY CARE | Facility: CLINIC | Age: 44
End: 2024-09-09
Payer: COMMERCIAL

## 2024-09-09 VITALS
HEIGHT: 69 IN | RESPIRATION RATE: 16 BRPM | SYSTOLIC BLOOD PRESSURE: 120 MMHG | WEIGHT: 211 LBS | OXYGEN SATURATION: 95 % | DIASTOLIC BLOOD PRESSURE: 72 MMHG | BODY MASS INDEX: 31.25 KG/M2 | TEMPERATURE: 97.7 F | HEART RATE: 99 BPM

## 2024-09-09 DIAGNOSIS — I25.118 CORONARY ARTERY DISEASE OF NATIVE ARTERY OF NATIVE HEART WITH STABLE ANGINA PECTORIS (CMS-HCC): ICD-10-CM

## 2024-09-09 DIAGNOSIS — E78.5 DYSLIPIDEMIA: ICD-10-CM

## 2024-09-09 DIAGNOSIS — I10 ESSENTIAL HYPERTENSION, BENIGN: ICD-10-CM

## 2024-09-09 DIAGNOSIS — E11.9 TYPE 2 DIABETES MELLITUS TREATED WITHOUT INSULIN (MULTI): ICD-10-CM

## 2024-09-09 DIAGNOSIS — Z95.1 S/P CABG X 3: Primary | ICD-10-CM

## 2024-09-09 NOTE — PROGRESS NOTES
Subjective   Patient ID: Montana Kern is a 44 y.o. male who presents for Hospital Follow-up (Patient was seen at [facility] on 08/23/2024 to 08/27/2024 for cardiac surgery. ).  HPI  44-year-old gentleman who had abnormal stress test referred to a [place] cardiology who performed a heart cath showed significant three-vessel disease he was admitted 823 for consideration for coronary artery bypass he had 3 bypasses on 824 and was discharged on 827.  He did quite well postoperatively getting his aspirin and ice of Sorbide immediately postop and then added Plavix after his chest tubes were removed he was maintained on Lasix and lisinopril and his Lasix recently been discontinued but is continued on his once a day metoprolol and once a day isosorbide and 1 baby aspirin and Lipitor.  For diabetes remains on metformin 1000 mg twice a day as well as 1 g of Ozempic and 2 mg of Amaryl before breakfast 2 mg before lunch and 4 mg before supper.  Since discharge he has had minimal abuse of his oxycodone he has no shortness of breath and just very minimal dehiscence at the inferior aspect of his sternotomy scar which is healing very nicely he has to sit in the chair right now because of pain but otherwise controllable is aware of cough deep breathe and different supportive measures.  He is not on an improved load carbohydrate low-salt and low-fat diet he is tolerating the above medicines well he has an appointment in about 3 to 4 days with the surgical nurse.  Also takes Neurontin 300 mg twice a day if needed for his diabetic peripheral neuropathy.  Gratefully he is lost little weight he feels little stronger at this time as he is 13 days post discharge.  High fever shaking chills no GI  issues at this time we did review inpatient hospitalization and is done quite well and will follow-up with the [place] cardiologist and thoracic surgery department.  Review of Systems   Constitutional:  Positive for fatigue. Negative  "for fever and unexpected weight change.   HENT:  Negative for sinus pressure, sinus pain and sore throat.    Eyes:  Negative for visual disturbance.   Respiratory:  Positive for chest tightness. Negative for cough, shortness of breath and wheezing.    Cardiovascular:  Positive for chest pain. Negative for palpitations and leg swelling.   Gastrointestinal:  Negative for abdominal pain, blood in stool and vomiting.   Genitourinary:  Positive for frequency. Negative for dysuria, flank pain and hematuria.   Musculoskeletal:  Positive for arthralgias and myalgias.   Skin:  Negative for rash.   Neurological:  Positive for weakness. Negative for tremors, syncope, speech difficulty, light-headedness and headaches.   Psychiatric/Behavioral:  Negative for behavioral problems, confusion, dysphoric mood, sleep disturbance and suicidal ideas.        Objective   /72 (BP Location: Left arm, Patient Position: Sitting, BP Cuff Size: Adult)   Pulse 99   Temp 36.5 °C (97.7 °F) (Temporal)   Resp 16   Ht 1.753 m (5' 9.02\")   Wt 95.7 kg (211 lb)   SpO2 95%   BMI 31.14 kg/m²           Physical Exam  Also reviewed and normal good pulse ox 95% pulse 84 after laxation and regular good blood pressure 120/76.  General-inspection reveals pleasant individual and really minimal distress  Chest wall nicely healing midline sternotomy scar and just perhaps a 0.5 x 0.5 wound dehiscence on the very inferior aspect of the sternotomy scar which was cleansed and placed a dry sterile dressing.  Neck neck is supple out masses adenopathy bruits rigidity no JVD no Kussmaul sign thyroid is normal  Pulmonary slightly reduced breath sounds the bases otherwise no wheezing rales or rhonchi  Cardiovascular RSR without murmurs gallop or ectopy no rubs  Abdominal upper abdomen puncture site from his pleural tubes are healing nicely otherwise no organomegaly masses or rebound bowel sounds are normal no CVA tenderness  Peripheral vascular sites nicely " healing no symmetrical asymmetrical edema distal pulses are intact and no sensorimotor deficits  Skin no discrete rashes petechiae or jaundice  Mood mood is stable without anxiety depressive or cognitive issues  Neurological no new neurological deficit the upper lower extremities no resting positional tremor      Assessment/Plan   Problem List Items Addressed This Visit    None  Rightfully the patient is doing very well postoperatively he will continue the buffed cardiac medicines the direction of his cardiologist and help with follow-up later this week with his cardiothoracic team and then in about 3 weeks with his cardiologist in [place] is important and maintain low-salt low carbohydrate low-fat diet obviously he will continue his low-dose Amaryl before breakfast lunch and supper as well as 1 mg of Ozempic and metformin twice a day will need to make sure he gets the kidney function in the next 3 to 4 weeks and he will also bring in his Accu-Cheks when seen in 3 to 4 weeks.  Otherwise he is doing well off Lasix with maintain his beta-blocker aspirin and lisinopril and isosorbide plan all question addressed above reviewed with spouse as well as patient if significant shortness of breath to notify his cardiac team and/or cardiologist agrees above diagnosis surveillance diet routines and oxycodone if needed deep breath and cough at home follow restrictions as directed by his cardiology team and be happy to see him in 3 to 4 weeks for follow-up lab

## 2024-09-11 ENCOUNTER — PATIENT OUTREACH (OUTPATIENT)
Dept: PRIMARY CARE | Facility: CLINIC | Age: 44
End: 2024-09-11
Payer: COMMERCIAL

## 2024-09-11 NOTE — PROGRESS NOTES
Unable to reach patient for call back after patient's follow up appointment with PCP.   MAAMEM with call back number for patient to call if needed   If no voicemail available call attempts x 2 were made to contact the patient to assist with any questions or concerns patient may have.

## 2024-09-12 ENCOUNTER — APPOINTMENT (OUTPATIENT)
Dept: CARDIAC SURGERY | Facility: CLINIC | Age: 44
End: 2024-09-12
Payer: COMMERCIAL

## 2024-09-12 PROBLEM — Z95.1 S/P CABG X 3: Status: ACTIVE | Noted: 2024-09-12

## 2024-09-12 PROBLEM — J01.00 ACUTE NON-RECURRENT MAXILLARY SINUSITIS: Status: RESOLVED | Noted: 2023-08-01 | Resolved: 2024-09-12

## 2024-09-12 PROBLEM — R07.9 EXERTIONAL CHEST PAIN: Status: RESOLVED | Noted: 2024-06-18 | Resolved: 2024-09-12

## 2024-09-12 ASSESSMENT — ENCOUNTER SYMPTOMS
PALPITATIONS: 0
SPEECH DIFFICULTY: 0
ARTHRALGIAS: 1
BLOOD IN STOOL: 0
FLANK PAIN: 0
COUGH: 0
TREMORS: 0
WHEEZING: 0
SINUS PAIN: 0
FREQUENCY: 1
FATIGUE: 1
UNEXPECTED WEIGHT CHANGE: 0
SINUS PRESSURE: 0
WEAKNESS: 1
SORE THROAT: 0
FEVER: 0
VOMITING: 0
HEADACHES: 0
LIGHT-HEADEDNESS: 0
DYSPHORIC MOOD: 0
SLEEP DISTURBANCE: 0
CONFUSION: 0
CHEST TIGHTNESS: 1
SHORTNESS OF BREATH: 0
ABDOMINAL PAIN: 0
HEMATURIA: 0
MYALGIAS: 1
DYSURIA: 0

## 2024-09-13 ENCOUNTER — TELEMEDICINE CLINICAL SUPPORT (OUTPATIENT)
Dept: CARDIAC SURGERY | Facility: CLINIC | Age: 44
End: 2024-09-13
Payer: COMMERCIAL

## 2024-09-13 NOTE — PROGRESS NOTES
"Virtual visit with Montana Kern this morning. Patient states he's feeling well, and is back to walking every morning with no signs of sob or pain.  Denies chest discomfort, dyspnea, palpitations, orthopnea.     S/p CABG x 3 on 8/24/24 with Dr. Michael Zhao (FINLEY-LAD, RA-OM, SVG-rPDA      Virtual topics discussed:     Nuero: On interview, patient is alert, conversant, in no acute distress, appears to be in good spirits.   Respiratory: denies sob, continues to use IS  Surgical incision: PAMELA, well approximated, C/D/I. Sternal stability, denies pain, MITT.   Medications: taking all medications as directed. HA have improved since discontinuing Isosorbide.    Homecare: weekly visits at this time.   Physical activity: ambulates without difficulty.   On average: 132/65/79/WT: His edema has resolved with furosemide.  Questions: Discussed cardiac rehab, driving privileges and dietary guidelines.     Patient's main complaint is that: Pt believes that \"the extra dose\" of Gabapentin is making his vision be sensitive to light.     Dexter Sommer, CNP made aware of patient's complaint. He has decreased Gabapentin to 300mg BID. He was previously on 300mg TID.       Patient made aware of new medication recommendation.     Patient encouraged to call me with any questions/concerns or if needs arise. Patient verbalized understanding.         Marija Hcikman RN    "

## 2024-09-22 NOTE — PROGRESS NOTES
Chief Complaint  POST OP Evaluation    HPI:   Mr. Montana Kern is a 44 y.o. male, who presents for post-operative evaluation.  His post-operative course was uneventful  He is now 1 month out from his operation, and is recovering nicely.  He has resumed normal activities and his appetite is returned to normal.  He has no chest pain, no shortness of breath and denies palpitations, dizziness, or syncope.  He does have some cough, it is non-productive.     Past Medical History:   Diagnosis Date    Acute bronchitis due to other specified organisms 11/26/2020    Acute bronchitis due to other specified organisms    Angina pectoris, unstable (Multi) 07/19/2024    Angina, class III ([facility]) 07/19/2024    Asthma ([facility])     BMI 30.0-30.9,adult 07/19/2024    Diabetes (Multi)     type 2    Dyslipidemia 08/01/2023    Essential hypertension, benign 08/01/2023    Exertional chest pain 06/18/2024    Hyperlipidemia     Hypertension     Kidney stones     passed on own    Osteoarthritis     ankles    Personal history of other specified conditions 11/26/2020    History of persistent cough    S/P CABG x 3 09/12/2024    Type 2 diabetes mellitus treated without insulin (Multi) 08/01/2023       Past Surgical History:   Procedure Laterality Date    CARDIAC CATHETERIZATION N/A 7/26/2024    Procedure: Left Heart Cath;  Surgeon: Rome Guevara MD;  Location: [facility];  Service: Cardiovascular;  Laterality: N/A;  on Aspirin and Metoprolol, hold diabetic meds morning of    HERNIA REPAIR      x 3    MULTIPLE TOOTH EXTRACTIONS      VASECTOMY         Family History   Problem Relation Name Age of Onset    Heart disease Mother      Stroke Father      Heart attack Father         Social History     Socioeconomic History    Marital status:      Spouse name: Not on file    Number of children: Not on file    Years of education: Not on file    Highest education level: Not on file   Occupational History    Not on file   Tobacco Use     Smoking status: Never    Smokeless tobacco: Never   Vaping Use    Vaping status: Never Used   Substance and Sexual Activity    Alcohol use: Yes     Comment: occasionally    Drug use: Never    Sexual activity: Defer   Other Topics Concern    Not on file   Social History Narrative    Not on file     Social Determinants of Health     Financial Resource Strain: Low Risk  (8/24/2024)    Overall Financial Resource Strain (CARDIA)     Difficulty of Paying Living Expenses: Not hard at all   Food Insecurity: No Food Insecurity (7/29/2024)    Hunger Vital Sign     Worried About Running Out of Food in the Last Year: Never true     Ran Out of Food in the Last Year: Never true   Transportation Needs: No Transportation Needs (8/24/2024)    PRAPARE - Transportation     Lack of Transportation (Medical): No     Lack of Transportation (Non-Medical): No   Physical Activity: Not on file   Stress: Not on file   Social Connections: Not on file   Intimate Partner Violence: Not on file   Housing Stability: Low Risk  (8/24/2024)    Housing Stability Vital Sign     Unable to Pay for Housing in the Last Year: No     Number of Times Moved in the Last Year: 0     Homeless in the Last Year: No       No Known Allergies    Outpatient Encounter Medications as of 9/25/2024   Medication Sig Dispense Refill    aspirin 81 mg EC tablet Take 1 tablet (81 mg) by mouth once daily.      atorvastatin (Lipitor) 80 mg tablet Take 1 tablet (80 mg) by mouth once daily. 30 tablet 1    gabapentin (Neurontin) 300 mg capsule Take 1 capsule (300 mg) by mouth 3 times a day. 270 capsule 3    glimepiride (Amaryl) 4 mg tablet TAKE 1/2 TABLET BY MOUTH TWICE DAILY BEFORE A MEAL AND 1 TABLET BY MOUTH BEFORE DINNER 180 tablet 3    lisinopril 40 mg tablet Take 1 tablet (40 mg) by mouth once daily. 90 tablet 3    metFORMIN (Glucophage) 1,000 mg tablet Take 1 tablet (1,000 mg) by mouth 2 times a day with meals. 180 tablet 3    metoprolol succinate XL (Toprol-XL) 50 mg 24 hr  tablet Take 1 tablet (50 mg) by mouth once daily. Do not crush or chew.      semaglutide (Ozempic) 1 mg/dose (4 mg/3 mL) pen injector Inject 1 mg under the skin 1 (one) time per week. Wednesday 9 mL 2     No facility-administered encounter medications on file as of 9/25/2024.       Physical Exam  Constitutional:       Appearance: Normal appearance.   HENT:      Head: Normocephalic.      Nose: Nose normal.   Eyes:      General: No scleral icterus.     Pupils: Pupils are equal, round, and reactive to light.   Cardiovascular:      Rate and Rhythm: Normal rate and regular rhythm.      Heart sounds: No murmur heard.  Pulmonary:      Effort: Pulmonary effort is normal.      Breath sounds: Normal breath sounds.   Abdominal:      General: Abdomen is flat.      Palpations: Abdomen is soft.   Musculoskeletal:         General: Normal range of motion.      Cervical back: Normal range of motion.      Comments: All incisions are healing well.    Skin:     General: Skin is warm and dry.   Neurological:      Mental Status: He is alert and oriented to person, place, and time.   Psychiatric:         Mood and Affect: Mood normal.         Encounter Date: 08/23/24   Electrocardiogram, 12-lead PRN ACS symptoms   Result Value    Systolic blood pressure 120    Diastolic blood pressure 71    Ventricular Rate 88    Atrial Rate 88    DE Interval 148    QRS Duration 94    QT Interval 358    QTC Calculation(Bazett) 433    P Axis 69    R Axis 47    T Axis -13    QRS Count 14    Q Onset 212    P Onset 138    P Offset 191    T Offset 391    QTC Fredericia 406    Narrative    Normal sinus rhythm  Possible Inferior infarct , age undetermined  Abnormal ECG  When compared with ECG of 24-AUG-2024 01:19, (unconfirmed)  Borderline criteria for Inferior infarct are now Present  Inverted T waves have replaced nonspecific T wave abnormality in Inferior leads  Nonspecific T wave abnormality now evident in Anterolateral leads  Confirmed by Fahad Sood  ([id number]) on 9/3/2024 8:40:20 AM     CXR: 9/25/24:  Clear costophrenic angles, no ptx.  Sternal wires in place and intact.     Assessment and Plan:    Mr. Montana Kern is a 44 y.o. male, who is recovering well after surgery.  I have released them from sternal precautions and referred them for cardiopulmonary rehab.  I have encouraged them to slowly return to normal activities, but refrain from heavy lifting for a full 12 weeks after surgery.  He will continue cardiovascular management with their cardiologist.  I am always happy to see them for any reason, but have released them from the practice.

## 2024-09-25 ENCOUNTER — OFFICE VISIT (OUTPATIENT)
Dept: CARDIAC SURGERY | Facility: CLINIC | Age: 44
End: 2024-09-25
Payer: COMMERCIAL

## 2024-09-25 ENCOUNTER — HOSPITAL ENCOUNTER (OUTPATIENT)
Dept: RADIOLOGY | Facility: HOSPITAL | Age: 44
Discharge: HOME | End: 2024-09-25
Payer: COMMERCIAL

## 2024-09-25 VITALS
OXYGEN SATURATION: 96 % | HEART RATE: 87 BPM | RESPIRATION RATE: 16 BRPM | HEIGHT: 69 IN | BODY MASS INDEX: 31.7 KG/M2 | SYSTOLIC BLOOD PRESSURE: 146 MMHG | WEIGHT: 214 LBS | DIASTOLIC BLOOD PRESSURE: 88 MMHG

## 2024-09-25 DIAGNOSIS — Z95.1 S/P CABG X 3: ICD-10-CM

## 2024-09-25 DIAGNOSIS — Z95.1 S/P CABG (CORONARY ARTERY BYPASS GRAFT): Primary | ICD-10-CM

## 2024-09-25 PROCEDURE — 71046 X-RAY EXAM CHEST 2 VIEWS: CPT

## 2024-09-25 PROCEDURE — 99211 OFF/OP EST MAY X REQ PHY/QHP: CPT | Performed by: THORACIC SURGERY (CARDIOTHORACIC VASCULAR SURGERY)

## 2024-09-25 NOTE — LETTER
September 25, 2024     Rome Guevara MD  [address], [address]  [facility] [address]    Patient: Montana Kern   YOB: 1980   Date of Visit: 9/25/2024       Dear Dr. Rome Guevara MD:    Thank you for referring Montana Kern to me for evaluation. Below are my notes for this consultation.  If you have questions, please do not hesitate to call me. I look forward to following your patient along with you.       Sincerely,     Bernabe Zhao MD      CC: Leonardo Muller, DO  ______________________________________________________________________________________    Chief Complaint  POST OP Evaluation    HPI:   Mr. Montana Kern is a 44 y.o. male, who presents for post-operative evaluation.  His post-operative course was uneventful  He is now 1 month out from his operation, and is recovering nicely.  He has resumed normal activities and his appetite is returned to normal.  He has no chest pain, no shortness of breath and denies palpitations, dizziness, or syncope.  He does have some cough, it is non-productive.     Past Medical History:   Diagnosis Date   • Acute bronchitis due to other specified organisms 11/26/2020    Acute bronchitis due to other specified organisms   • Angina pectoris, unstable (Multi) 07/19/2024   • Angina, class III (CMS-HCC) 07/19/2024   • Asthma ([facility])    • BMI 30.0-30.9,adult 07/19/2024   • Diabetes (Multi)     type 2   • Dyslipidemia 08/01/2023   • Essential hypertension, benign 08/01/2023   • Exertional chest pain 06/18/2024   • Hyperlipidemia    • Hypertension    • Kidney stones     passed on own   • Osteoarthritis     ankles   • Personal history of other specified conditions 11/26/2020    History of persistent cough   • S/P CABG x 3 09/12/2024   • Type 2 diabetes mellitus treated without insulin (Multi) 08/01/2023       Past Surgical History:   Procedure Laterality Date   • CARDIAC CATHETERIZATION N/A 7/26/2024    Procedure: Left Heart Cath;   Surgeon: Rome Guevara MD;  Location: [facility];  Service: Cardiovascular;  Laterality: N/A;  on Aspirin and Metoprolol, hold diabetic meds morning of   • HERNIA REPAIR      x 3   • MULTIPLE TOOTH EXTRACTIONS     • VASECTOMY         Family History   Problem Relation Name Age of Onset   • Heart disease Mother     • Stroke Father     • Heart attack Father         Social History     Socioeconomic History   • Marital status:      Spouse name: Not on file   • Number of children: Not on file   • Years of education: Not on file   • Highest education level: Not on file   Occupational History   • Not on file   Tobacco Use   • Smoking status: Never   • Smokeless tobacco: Never   Vaping Use   • Vaping status: Never Used   Substance and Sexual Activity   • Alcohol use: Yes     Comment: occasionally   • Drug use: Never   • Sexual activity: Defer   Other Topics Concern   • Not on file   Social History Narrative   • Not on file     Social Determinants of Health     Financial Resource Strain: Low Risk  (8/24/2024)    Overall Financial Resource Strain (CARDIA)    • Difficulty of Paying Living Expenses: Not hard at all   Food Insecurity: No Food Insecurity (7/29/2024)    Hunger Vital Sign    • Worried About Running Out of Food in the Last Year: Never true    • Ran Out of Food in the Last Year: Never true   Transportation Needs: No Transportation Needs (8/24/2024)    PRAPARE - Transportation    • Lack of Transportation (Medical): No    • Lack of Transportation (Non-Medical): No   Physical Activity: Not on file   Stress: Not on file   Social Connections: Not on file   Intimate Partner Violence: Not on file   Housing Stability: Low Risk  (8/24/2024)    Housing Stability Vital Sign    • Unable to Pay for Housing in the Last Year: No    • Number of Times Moved in the Last Year: 0    • Homeless in the Last Year: No       No Known Allergies    Outpatient Encounter Medications as of 9/25/2024   Medication Sig Dispense  Refill   • aspirin 81 mg EC tablet Take 1 tablet (81 mg) by mouth once daily.     • atorvastatin (Lipitor) 80 mg tablet Take 1 tablet (80 mg) by mouth once daily. 30 tablet 1   • gabapentin (Neurontin) 300 mg capsule Take 1 capsule (300 mg) by mouth 3 times a day. 270 capsule 3   • glimepiride (Amaryl) 4 mg tablet TAKE 1/2 TABLET BY MOUTH TWICE DAILY BEFORE A MEAL AND 1 TABLET BY MOUTH BEFORE DINNER 180 tablet 3   • lisinopril 40 mg tablet Take 1 tablet (40 mg) by mouth once daily. 90 tablet 3   • metFORMIN (Glucophage) 1,000 mg tablet Take 1 tablet (1,000 mg) by mouth 2 times a day with meals. 180 tablet 3   • metoprolol succinate XL (Toprol-XL) 50 mg 24 hr tablet Take 1 tablet (50 mg) by mouth once daily. Do not crush or chew.     • semaglutide (Ozempic) 1 mg/dose (4 mg/3 mL) pen injector Inject 1 mg under the skin 1 (one) time per week. Wednesday 9 mL 2     No facility-administered encounter medications on file as of 9/25/2024.       Physical Exam  Constitutional:       Appearance: Normal appearance.   HENT:      Head: Normocephalic.      Nose: Nose normal.   Eyes:      General: No scleral icterus.     Pupils: Pupils are equal, round, and reactive to light.   Cardiovascular:      Rate and Rhythm: Normal rate and regular rhythm.      Heart sounds: No murmur heard.  Pulmonary:      Effort: Pulmonary effort is normal.      Breath sounds: Normal breath sounds.   Abdominal:      General: Abdomen is flat.      Palpations: Abdomen is soft.   Musculoskeletal:         General: Normal range of motion.      Cervical back: Normal range of motion.      Comments: All incisions are healing well.    Skin:     General: Skin is warm and dry.   Neurological:      Mental Status: He is alert and oriented to person, place, and time.   Psychiatric:         Mood and Affect: Mood normal.         Encounter Date: 08/23/24   Electrocardiogram, 12-lead PRN ACS symptoms   Result Value    Systolic blood pressure 120    Diastolic blood pressure  71    Ventricular Rate 88    Atrial Rate 88    NE Interval 148    QRS Duration 94    QT Interval 358    QTC Calculation(Bazett) 433    P Axis 69    R Axis 47    T Axis -13    QRS Count 14    Q Onset 212    P Onset 138    P Offset 191    T Offset 391    QTC Fredericia 406    Narrative    Normal sinus rhythm  Possible Inferior infarct , age undetermined  Abnormal ECG  When compared with ECG of 24-AUG-2024 01:19, (unconfirmed)  Borderline criteria for Inferior infarct are now Present  Inverted T waves have replaced nonspecific T wave abnormality in Inferior leads  Nonspecific T wave abnormality now evident in Anterolateral leads  Confirmed by Fahad Sood ([id number]) on 9/3/2024 8:40:20 AM     CXR: 9/25/24:  Clear costophrenic angles, no ptx.  Sternal wires in place and intact.     Assessment and Plan:    Mr. Montana Kern is a 44 y.o. male, who is recovering well after surgery.  I have released them from sternal precautions and referred them for cardiopulmonary rehab.  I have encouraged them to slowly return to normal activities, but refrain from heavy lifting for a full 12 weeks after surgery.  He will continue cardiovascular management with their cardiologist.  I am always happy to see them for any reason, but have released them from the practice.

## 2024-09-26 ENCOUNTER — PATIENT OUTREACH (OUTPATIENT)
Dept: PRIMARY CARE | Facility: CLINIC | Age: 44
End: 2024-09-26
Payer: COMMERCIAL

## 2024-09-27 ENCOUNTER — APPOINTMENT (OUTPATIENT)
Dept: PRIMARY CARE | Facility: CLINIC | Age: 44
End: 2024-09-27
Payer: COMMERCIAL

## 2024-09-27 VITALS
OXYGEN SATURATION: 97 % | SYSTOLIC BLOOD PRESSURE: 124 MMHG | WEIGHT: 212 LBS | TEMPERATURE: 96.8 F | HEART RATE: 97 BPM | RESPIRATION RATE: 16 BRPM | HEIGHT: 69 IN | DIASTOLIC BLOOD PRESSURE: 78 MMHG | BODY MASS INDEX: 31.4 KG/M2

## 2024-09-27 DIAGNOSIS — E78.5 DYSLIPIDEMIA: ICD-10-CM

## 2024-09-27 DIAGNOSIS — I10 ESSENTIAL HYPERTENSION, BENIGN: ICD-10-CM

## 2024-09-27 DIAGNOSIS — E11.9 TYPE 2 DIABETES MELLITUS TREATED WITHOUT INSULIN (MULTI): Primary | ICD-10-CM

## 2024-09-27 DIAGNOSIS — Z23 NEED FOR INFLUENZA VACCINATION: ICD-10-CM

## 2024-09-27 DIAGNOSIS — Z95.1 S/P CABG X 3: ICD-10-CM

## 2024-09-27 RX ORDER — LOSARTAN POTASSIUM 25 MG/1
25 TABLET ORAL DAILY
Qty: 30 TABLET | Refills: 1 | Status: SHIPPED | OUTPATIENT
Start: 2024-09-27 | End: 2024-09-30 | Stop reason: SDUPTHER

## 2024-09-27 RX ORDER — METOPROLOL SUCCINATE 50 MG/1
50 TABLET, EXTENDED RELEASE ORAL DAILY
Qty: 90 TABLET | Refills: 3 | Status: SHIPPED | OUTPATIENT
Start: 2024-09-27

## 2024-09-27 NOTE — PROGRESS NOTES
Subjective   Patient ID: Montana Kern is a 44 y.o. male who presents for S/P CABG x 3 (2 week follow up ) and Cough.  HPI  44-year-old gentleman is here to recheck blood pressure with history of hypertension as well as dyslipidemia.  He did very well with status post CABG last month and remains on his beta-blocker and lisinopril the direction of his cardiologist as well as his Lipitor and baby aspirin.  He does have intermittent cough and we did review potential switch him from lisinopril to losartan to continue helping his blood pressure but also to help his cough he agrees to this we will start him on low-dose losartan and recheck in 2 to 3 weeks gratefully his pain from his open heart surgery in his chest is read doing very well he is off work and has been observing his low-salt low carbohydrate low-fat diet well  His sugars been much improved and he is taking his Ozempic 1 mg daily in addition to his low-dose 1 mg of Amaryl before meals twice a day addition to metformin twice a day.  Sugars at home is much more stable  Gratefully no increasing orthopnea at this time he has no fever or productive cough with this.  He has a history of peripheral neuropathy does take gabapentin at least twice a day.  Otherwise is doing quite well from his surgery  Review of Systems   Constitutional:  Positive for fatigue. Negative for fever and unexpected weight change.   HENT:  Positive for rhinorrhea. Negative for sinus pressure and sinus pain.    Eyes:  Negative for visual disturbance.   Respiratory:  Positive for cough and chest tightness (Proving postop but chest wall pain from open heart surgery). Negative for shortness of breath and wheezing.    Cardiovascular:  Negative for chest pain, palpitations and leg swelling.   Gastrointestinal:  Negative for abdominal pain and blood in stool.   Genitourinary:  Positive for frequency. Negative for dysuria and hematuria.   Musculoskeletal:  Positive for myalgias. Negative for  "arthralgias.   Skin:  Negative for rash.   Neurological:  Positive for weakness. Negative for tremors, seizures, syncope, light-headedness and headaches.   Hematological:  Negative for adenopathy.   Psychiatric/Behavioral:  Negative for behavioral problems, confusion and sleep disturbance. The patient is not nervous/anxious.        Objective   /78 (BP Location: Right arm, Patient Position: Sitting, BP Cuff Size: Adult)   Pulse 97   Temp 36 °C (96.8 °F) (Temporal)   Resp 16   Ht 1.753 m (5' 9\")   Wt 96.2 kg (212 lb)   SpO2 97%   BMI 31.31 kg/m²           Physical Exam  Also reviewed normal pulse ox is 97%  General-inspection reveals pleasant individual in no acute distress less pallor  Neck neck is supple no mass adenopathy bruits rigidity  Chest wall nicely healing sternotomy scar without infection  Pulmonary chest clear minor reduced breath sounds the bases but otherwise no wheezing rales or rhonchi  Cardiovascular RSR without significant murmurs gallop or ectopy  Abdominal no organomegaly masses or rebound.  Peripheral vascular no symmetric asymmetric edema distal pulses are intact  Skin no new rashes petechiae or jaundice      Assessment/Plan   Problem List Items Addressed This Visit       Essential hypertension, benign     Other Visit Diagnoses       Need for influenza vaccination            Potential ACE related cough, will switch from lisinopril to low-dose losartan 25 mg and then recheck in 2 to 3 weeks for progress  Will check BMP and A1c because of diabetes before his visit update his flu shot today continue his other cardiac medicines direction of his cardiologist.  Continue low-salt low carbohydrate and low-fat diet as well as Lipitor Ozempic metformin low-dose Amaryl.  New eval gabapentin if needed for the leg burning and return to work will be directed by his cardiologist otherwise done very well status post CABG  If worsening chest pain or shortness of breath despite treatment with " weakness, proceed to ER   @discharge  The above diagnosis and treatment plan was discussed with the patient patient will continue appropriate diet and exercise as reviewed  Patient will recheck earlier if any interval problems of significance or clinical worsening of the above problems.  Agrees above surveillance.  All question were addressed regarding above meds

## 2024-09-30 DIAGNOSIS — I10 ESSENTIAL HYPERTENSION, BENIGN: Primary | ICD-10-CM

## 2024-09-30 PROBLEM — Z23 NEED FOR INFLUENZA VACCINATION: Status: ACTIVE | Noted: 2024-09-30

## 2024-09-30 RX ORDER — LOSARTAN POTASSIUM 25 MG/1
25 TABLET ORAL DAILY
Qty: 90 TABLET | Refills: 1 | Status: SHIPPED | OUTPATIENT
Start: 2024-09-30 | End: 2025-03-29

## 2024-09-30 ASSESSMENT — ENCOUNTER SYMPTOMS
SEIZURES: 0
SINUS PRESSURE: 0
WEAKNESS: 1
DYSURIA: 0
CONFUSION: 0
SHORTNESS OF BREATH: 0
ABDOMINAL PAIN: 0
WHEEZING: 0
SINUS PAIN: 0
BLOOD IN STOOL: 0
COUGH: 1
RHINORRHEA: 1
MYALGIAS: 1
HEMATURIA: 0
ADENOPATHY: 0
HEADACHES: 0
UNEXPECTED WEIGHT CHANGE: 0
NERVOUS/ANXIOUS: 0
FEVER: 0
PALPITATIONS: 0
FREQUENCY: 1
TREMORS: 0
ARTHRALGIAS: 0
LIGHT-HEADEDNESS: 0
FATIGUE: 1
SLEEP DISTURBANCE: 0
CHEST TIGHTNESS: 1

## 2024-09-30 NOTE — TELEPHONE ENCOUNTER
90 DAY SUPPLY Rx Refill Request     Name: Montana Kern  :  1980   Medication Name:    losartan (Cozaar) 25 mg tablet     Last written: 2024 30 day supply Q 30 R 1  Specific Pharmacy location:  [facility]  Date of last appointment:  2024  Date of next appointment: 10.22.2024  Best number to reach patient:  [phone]       RX PENDED to [facility] as directed by fax

## 2024-10-02 DIAGNOSIS — Z00.6 RESEARCH STUDY PATIENT: ICD-10-CM

## 2024-10-03 ENCOUNTER — TELEPHONE (OUTPATIENT)
Dept: CARDIAC SURGERY | Facility: CLINIC | Age: 44
End: 2024-10-03
Payer: COMMERCIAL

## 2024-10-03 ENCOUNTER — APPOINTMENT (OUTPATIENT)
Dept: CARDIOLOGY | Facility: HOSPITAL | Age: 44
End: 2024-10-03
Payer: COMMERCIAL

## 2024-10-03 DIAGNOSIS — I25.119 CORONARY ARTERY DISEASE INVOLVING NATIVE CORONARY ARTERY OF NATIVE HEART WITH ANGINA PECTORIS: ICD-10-CM

## 2024-10-03 DIAGNOSIS — I48.91 ATRIAL FIBRILLATION, UNSPECIFIED TYPE (MULTI): ICD-10-CM

## 2024-10-03 DIAGNOSIS — Z95.818 PRESENCE OF LEFT ATRIAL APPENDAGE CLOSURE DEVICE: ICD-10-CM

## 2024-10-03 DIAGNOSIS — Z00.6 RESEARCH STUDY PATIENT: ICD-10-CM

## 2024-10-03 DIAGNOSIS — E78.5 DYSLIPIDEMIA: ICD-10-CM

## 2024-10-03 RX ORDER — ATORVASTATIN CALCIUM 80 MG/1
80 TABLET, FILM COATED ORAL DAILY
Qty: 30 TABLET | Refills: 3 | Status: SHIPPED | OUTPATIENT
Start: 2024-10-03 | End: 2025-01-31

## 2024-10-03 NOTE — TELEPHONE ENCOUNTER
Rx Refill Request Telephone Encounter    Name:  Montana Kern  :  [id number]  Medication Name:  ATORVASTATIN 80MG   Specific Pharmacy location:  [facility]  Date of last appointment:  24  Date of next appointment:  10-22-24  Best number to reach patient: [phone]

## 2024-10-03 NOTE — TELEPHONE ENCOUNTER
Dr. Zhao made aware of symptoms. He wants patient to wear a heart monitor x7 days.      Action: Holter monitor placed and pt aware.

## 2024-10-08 ENCOUNTER — APPOINTMENT (OUTPATIENT)
Dept: CARDIOLOGY | Facility: CLINIC | Age: 44
End: 2024-10-08
Payer: COMMERCIAL

## 2024-10-08 ENCOUNTER — TELEPHONE (OUTPATIENT)
Dept: CARDIOLOGY | Facility: CLINIC | Age: 44
End: 2024-10-08

## 2024-10-08 VITALS
HEART RATE: 70 BPM | WEIGHT: 211.4 LBS | SYSTOLIC BLOOD PRESSURE: 118 MMHG | DIASTOLIC BLOOD PRESSURE: 86 MMHG | BODY MASS INDEX: 31.22 KG/M2

## 2024-10-08 DIAGNOSIS — I25.10 CORONARY ARTERY DISEASE INVOLVING NATIVE CORONARY ARTERY OF NATIVE HEART WITHOUT ANGINA PECTORIS: ICD-10-CM

## 2024-10-08 DIAGNOSIS — Z78.9 NEVER SMOKED CIGARETTES: ICD-10-CM

## 2024-10-08 DIAGNOSIS — I10 ESSENTIAL HYPERTENSION, BENIGN: ICD-10-CM

## 2024-10-08 DIAGNOSIS — E11.9 TYPE 2 DIABETES MELLITUS TREATED WITHOUT INSULIN (MULTI): ICD-10-CM

## 2024-10-08 DIAGNOSIS — R00.2 PALPITATIONS: ICD-10-CM

## 2024-10-08 DIAGNOSIS — Z95.1 S/P CABG X 3: ICD-10-CM

## 2024-10-08 DIAGNOSIS — E78.5 DYSLIPIDEMIA: ICD-10-CM

## 2024-10-08 NOTE — TELEPHONE ENCOUNTER
7 day zio patch [id number] no auth required per [place]/[employer] portal transaction ID# [id number]

## 2024-10-08 NOTE — PATIENT INSTRUCTIONS
You will be scheduled for a 7 day Ziopatch monitor  Will call patient with test results once reviewed by physician  Follow up in 2-3 months  Continue same medications/treatment.  Patient educated on proper medication use.  Patient educated on risk factor modification.  Please bring any lab results from other providers / physicians to your next appointment.    Please bring all medicines, vitamins and herbal supplements with you when you come to the office.    Prescriptions will not be filled unless you are compliant with your follow up appointments or have a follow up  appointment scheduled as per instruction of your physician.  Refills should be requested at the time of  Your visit.    Fanny SILVER LPN, am scribing for and in the presence of  Dr. Rome Guevara MD, FACC

## 2024-10-08 NOTE — PROGRESS NOTES
Referred by Dr. Suarez ref. provider found provider found for   Chief Complaint   Patient presents with    Follow-up     Patient is present for hospital follow up.        History of Present Illness  Montana Kern is a 44 y.o. year old male patient is here for follow-up following his coronary bypass surgery.  Is about 4 weeks now postop.  He has not had any issues postoperatively.  He started to drive.  He saw the cardiac surgeon and apparently is still restricted and the amount of weight he can lift.  Scheduled to go to cardiac rehab program.  He complain of occasional episode of palpitation with short runs of rapid heartbeats.  I told the patient we want to rule out paroxysmal atrial fibrillation therefore we will go ahead with 7 days Holter monitor.  Based on the above further recommendation will be made    Past Medical History  Past Medical History:   Diagnosis Date    Acute bronchitis due to other specified organisms 11/26/2020    Acute bronchitis due to other specified organisms    Angina pectoris, unstable (Multi) 07/19/2024    Angina, class III (CMS-HCC) 07/19/2024    Asthma     BMI 30.0-30.9,adult 07/19/2024    Diabetes (Multi)     type 2    Dyslipidemia 08/01/2023    Essential hypertension, benign 08/01/2023    Exertional chest pain 06/18/2024    Hyperlipidemia     Hypertension     Kidney stones     passed on own    Osteoarthritis     ankles    Personal history of other specified conditions 11/26/2020    History of persistent cough    S/P CABG x 3 09/12/2024    Type 2 diabetes mellitus treated without insulin (Multi) 08/01/2023       Social History  Social History     Tobacco Use    Smoking status: Never    Smokeless tobacco: Never   Vaping Use    Vaping status: Never Used   Substance Use Topics    Alcohol use: Yes     Comment: occasionally    Drug use: Never       Family History     Family History   Problem Relation Name Age of Onset    Heart disease Mother      Stroke Father      Heart attack Father          Review of Systems  As per HPI, all other systems reviewed and negative.    Allergies:  No Known Allergies     Outpatient Medications:  Current Outpatient Medications   Medication Instructions    aspirin 81 mg, oral, Daily    atorvastatin (LIPITOR) 80 mg, oral, Daily    gabapentin (NEURONTIN) 300 mg, oral, 3 times daily    glimepiride (Amaryl) 4 mg tablet TAKE 1/2 TABLET BY MOUTH TWICE DAILY BEFORE A MEAL AND 1 TABLET BY MOUTH BEFORE DINNER    lisinopril 40 mg, oral, Daily    losartan (COZAAR) 25 mg, oral, Daily    metFORMIN (GLUCOPHAGE) 1,000 mg, oral, 2 times daily (morning and late afternoon)    metoprolol succinate XL (TOPROL-XL) 50 mg, oral, Daily, Do not crush or chew.    Ozempic 1 mg, subcutaneous, Once Weekly, Wednesday         Vitals:  Vitals:    10/08/24 1340   BP: 118/86   Pulse: 70       Physical Exam:  Physical Exam  Vitals and nursing note reviewed.   Constitutional:       Appearance: Normal appearance.   HENT:      Head: Normocephalic and atraumatic.   Eyes:      Extraocular Movements: Extraocular movements intact.      Pupils: Pupils are equal, round, and reactive to light.   Cardiovascular:      Rate and Rhythm: Normal rate and regular rhythm.      Pulses: Normal pulses.   Pulmonary:      Effort: Pulmonary effort is normal.      Breath sounds: Normal breath sounds.   Musculoskeletal:         General: Normal range of motion.      Cervical back: Normal range of motion.      Right lower leg: No edema.      Left lower leg: No edema.   Skin:     General: Skin is warm and dry.   Neurological:      General: No focal deficit present.      Mental Status: He is alert and oriented to person, place, and time.             Assessment/Plan   Diagnoses and all orders for this visit:  Palpitations  Coronary artery disease involving native coronary artery of native heart without angina pectoris  S/P CABG x 3  Essential hypertension, benign  Dyslipidemia  Type 2 diabetes mellitus treated without insulin  (Multi)  BMI 31.0-31.9,adult  Never smoked cigarettes          Rome Guevara MD [facility]  Interventional Cardiology   of [facility]     Thank you for allowing me to participate in the care of this patient. Please do not hesitate to contact me with any further questions or concerns.

## 2024-10-09 ENCOUNTER — CLINICAL SUPPORT (OUTPATIENT)
Dept: CARDIAC REHAB | Facility: HOSPITAL | Age: 44
End: 2024-10-09
Payer: COMMERCIAL

## 2024-10-09 ENCOUNTER — TRANSCRIBE ORDERS (OUTPATIENT)
Dept: CARDIAC REHAB | Facility: HOSPITAL | Age: 44
End: 2024-10-09

## 2024-10-09 DIAGNOSIS — Z95.1 S/P CABG (CORONARY ARTERY BYPASS GRAFT): ICD-10-CM

## 2024-10-09 DIAGNOSIS — Z95.1 STATUS POST CORONARY ARTERY BYPASS GRAFT: Primary | ICD-10-CM

## 2024-10-09 NOTE — PROGRESS NOTES
"INDIVIDUAL CARDIAC TREATMENT PLAN-INITIAL ASSESSMENT     Name: Montana Kern   Today's Date: 10/09/24   : 1980    Primary Provider: Dr. Muller  MRN: [id number]    Referring Physician: Dr. Guevara     Diagnosis: CABG    Onset Date: 2024      Risk Stratification: Moderate      NUTRITION ASSESSMENT  Lipids:   Lipid Lab Date: 2024  Total Chol:153  HDL:51  LDL:84  Tri  Cholesterol Med: LIPITOR    Diabetes: Yes  HgbA1c: 8.0  Date Checked: 24  Monitors glucose at home: Yes  Fasting Blood Sugar Range:120-130  Frequency: once day  Hypoglycemic Episode: denies    Weight Management  Weight: 213.5 lbs  Height: 5'9\"  BMI:  31.5  Current Diet: Regular  Barriers to dietary change: none    Initial Dietary Assessment Score: 71%  Discharge Dietary Assessment Score:       NUTRITION PLAN  Nutrition Goals:   1. Improve Picture Your Plate assessment results by discharge.  2. Make changes to diet to include heart healthy options while in the program.    Nutrition Intervention/Education:     *Perform weekly weight checks on .     OTHER CORE COMPONENTS/ RISK FACTORS ASSESSMENT  Medication compliance: good compliance  Using pill box: Yes  Carries medication list: No    Blood Pressure Management:  History of High BP: Yes  Resting BP: 130/88    Tobacco: NEVER  Anyone in the house smoke: No    Initial Knowledge Test Score: 7/15  Discharge Knowledge Test Score:    OTHER CORE COMPONENTS/ RISK FACTOR PLAN   Other Core components/Risk Factor Goals:                                                                                                                                                    1. Achieve and maintain a resting blood pressure less than 130/80 while in the program.  2. Gain knowledge of cardiac disease and lifestyle modifications related to exercise and ADL's prior to discharge.    Other Components/ Risk Factors Intervention/Education:  *Will continue to monitor HR, BP, dyspnea and arrhythmias each " session.   *Will meet with patient to discuss goals & progress.  *Encouraged review of education materials.       PSYCHOSOCIAL ASSESSMENT  Patient reported stress level: mild  Using stress management skills: Yes  HX of anxiety: No  HX of depression: No  Patient reported any new stress, depression and anxiety symptoms: Yes    Family/Support System: wife  Seeing mental health provider: No  Psychosocial medications: none    Initial PHQ-9 score: 0  [place] PHQ-9 score:  Discharge PHQ-9 score:   Was PHQ-9 sent to provider: No  Date sent:    Quality of Life Survey: SF-36 Pre Post   Physical Component Score TBD TBD   Mental Component Score TBD TBD     Stages of change:  Preparation    PSYCHOSOCIAL PLAN  Psychosocial Goals:  1. Improve stage of change while in the program.  2.  To decrease or maintain PHQ-9 survey score by discharge.  3. To learn stress management techniques while in the program.    Psychosocial Interventions/ Education:  * Provided one on one emotional support and will facilitate peer support within the context of other phase II patients while in the program.       EXERCISE ASSESSMENT  Home Exercise: No  Frequency:   Mode: no equipment    EXERCISE PLAN  Exercise Goals:   1. Goal of increasing METs 5-10% each week or as tolerated.  2. Have a plan in place for continued exercise after the program by discharge.  3. To exercise 30-45 minutes duration.  Exercise Prescription:   Frequency: 3 days per week  Duration (total aerobic min.): 30 minutes  Intensity RPE: 11-14  Target HR: 20-30 beats above resting Heart rate  MET Level Range: 2.1-2.3     Modality METS Load  Duration   1 Warm Up    05:00   2 Treadmill 2.2 1.5  10:00   3 NuStep 2.1 32 cruz 2 LV 10:00   4 Recumbent 2.3 6 cruz 2 LV 10:00   5        6        7 Cool Down     05:00     Exercise Intervention/Education:   *Aim to progress METS 5-10% every Week or as tolerated.  *Incorporate resistance training for muscular endurance and strength.      Date of  "first exercise session: Pending MD signature      LEARNING ASSESSMENT & BARRIERS  Readiness to Learn:  Barriers: None  Comments:    FALL RISK  low  Comments:        INDIVIDUAL PATIENT GOALS:  To get my strength back  2.    To get back to my normal activities.        MEDICATIONS  Current Outpatient Medications   Medication Instructions    aspirin 81 mg, oral, Daily    atorvastatin (LIPITOR) 80 mg, oral, Daily    gabapentin (NEURONTIN) 300 mg, oral, 3 times daily    glimepiride (Amaryl) 4 mg tablet TAKE 1/2 TABLET BY MOUTH TWICE DAILY BEFORE A MEAL AND 1 TABLET BY MOUTH BEFORE DINNER    losartan (COZAAR) 25 mg, oral, Daily    metFORMIN (GLUCOPHAGE) 1,000 mg, oral, 2 times daily (morning and late afternoon)    metoprolol succinate XL (TOPROL-XL) 50 mg, oral, Daily, Do not crush or chew.    Ozempic 1 mg, subcutaneous, Once Weekly, Wednesday                STAFF COMMENTS: Patient reports no angina since prior to his CABG. He has had 4 episodes of a \"fluttering feeling\" in his chest that the doctor is aware of. He states it lasts a few minutes then goes away. He states he is going to be set up with a holter monitor. Discussed bringing a snack and his diabetic meter to each session with a snack and S/S of hypoglycemia. Cardiac rehab diabetic guidelines handout given.          CARDIAC ASSESSMENT     Name: Montana Kern   : 1980   Diagnosis: CABG  MRN: [id number]   Onset Date: 2024   Today's Date: 10/09/24      Cardiovascular   HX: CAD, HTN, and HLD  Family HX of CAD:  Yes  Angina: exertional, pain  Describe: midsternal  Last Episode: before surgery  History of Heart Failure: No  EF: Over 50%  Onset of HF:  Last HF hospitalization:  Family HX of HF:  No      Devices: denies  HX of PAD: No    Arrythmias: normal sinus rhythm  Heart Rate: 86  BP: 130/88  Radial pulses:  R Present 2+     Comments:      Respiratory  HX: Asthma  Dyspnea:  No  Describe:  HX KATTY:  No  CPAP Use:  denies  Family History of Lung Disease:  " No    Resting O2 sat: 96%  Lung Sounds:  clear  Locations: all lobes    Flu Vaccine: Yes  Covid Vaccine: Yes  Pneumonia Vaccine: No  Shingles Vaccine: No    Comments:    Neurological   Orientation: oriented to person, place, time, and general circumstances  HX: denies  History of stroke/TIA?: denies    Comments:      Skin  Skin Color: normal, no cyanosis, jaundice, pallor or bruising  Edema: denies     Comments:    Gastrointestinal/Genitourinary  HX: denies  Comments:      Psychosocial  Marital status:   Children: 4  Lives alone:  No  Lives with:  Drives:  Yes  Occupation: is employed full time as a .  Occupational demands include frequent heavy lifting and frequent standing and walking   Caretaker of family member?:  No  Do you feel safe at home?:  Yes    Caffeinated drinks per day: 1  Alcoholic drinks per day/week: social  HX drug or alcohol abuse: No  Current use of illicit drugs: No  Marijuana use: No    Comments:    Musculoskeletal  HX of injury/surgery: had 3 hernia surgery last year     Comments:    Pain Assessment  Current pain: denies  Location:  Description:    Comments:    Fall Risk Assessment  Assistive device: no device  Needs assistance:  No  Afraid of falling:  No  Fall within the past 6 months?: No  Injured with fall:  Fall risk results: low

## 2024-10-14 ENCOUNTER — TELEPHONE (OUTPATIENT)
Dept: CARDIAC SURGERY | Facility: CLINIC | Age: 44
End: 2024-10-14

## 2024-10-14 ENCOUNTER — CLINICAL SUPPORT (OUTPATIENT)
Dept: CARDIAC REHAB | Facility: HOSPITAL | Age: 44
End: 2024-10-14
Payer: COMMERCIAL

## 2024-10-14 DIAGNOSIS — Z95.1 STATUS POST CORONARY ARTERY BYPASS GRAFT: ICD-10-CM

## 2024-10-14 PROCEDURE — 93798 PHYS/QHP OP CAR RHAB W/ECG: CPT | Performed by: INTERNAL MEDICINE

## 2024-10-14 NOTE — TELEPHONE ENCOUNTER
Mrs. Kern states that they saw cardiologist, Dr. Caputo and that he will be re-ordering and following cardiac monitor results at this time.     Advised to call our office with any future needs.

## 2024-10-15 ENCOUNTER — APPOINTMENT (OUTPATIENT)
Dept: CARDIOLOGY | Facility: CLINIC | Age: 44
End: 2024-10-15
Payer: COMMERCIAL

## 2024-10-15 DIAGNOSIS — R00.2 PALPITATIONS: ICD-10-CM

## 2024-10-15 PROCEDURE — 93246 EXT ECG>7D<15D RECORDING: CPT | Performed by: INTERNAL MEDICINE

## 2024-10-15 PROCEDURE — 93248 EXT ECG>7D<15D REV&INTERPJ: CPT | Performed by: INTERNAL MEDICINE

## 2024-10-16 ENCOUNTER — CLINICAL SUPPORT (OUTPATIENT)
Dept: CARDIAC REHAB | Facility: HOSPITAL | Age: 44
End: 2024-10-16
Payer: COMMERCIAL

## 2024-10-16 DIAGNOSIS — Z95.1 STATUS POST CORONARY ARTERY BYPASS GRAFT: ICD-10-CM

## 2024-10-16 PROCEDURE — 93798 PHYS/QHP OP CAR RHAB W/ECG: CPT | Performed by: INTERNAL MEDICINE

## 2024-10-21 ENCOUNTER — CLINICAL SUPPORT (OUTPATIENT)
Dept: CARDIAC REHAB | Facility: HOSPITAL | Age: 44
End: 2024-10-21
Payer: COMMERCIAL

## 2024-10-21 DIAGNOSIS — Z95.1 STATUS POST CORONARY ARTERY BYPASS GRAFT: ICD-10-CM

## 2024-10-21 PROCEDURE — 93798 PHYS/QHP OP CAR RHAB W/ECG: CPT | Performed by: INTERNAL MEDICINE

## 2024-10-22 ENCOUNTER — APPOINTMENT (OUTPATIENT)
Dept: PRIMARY CARE | Facility: CLINIC | Age: 44
End: 2024-10-22
Payer: COMMERCIAL

## 2024-10-23 ENCOUNTER — CLINICAL SUPPORT (OUTPATIENT)
Dept: CARDIAC REHAB | Facility: HOSPITAL | Age: 44
End: 2024-10-23
Payer: COMMERCIAL

## 2024-10-23 DIAGNOSIS — Z95.1 STATUS POST CORONARY ARTERY BYPASS GRAFT: ICD-10-CM

## 2024-10-23 PROCEDURE — 93798 PHYS/QHP OP CAR RHAB W/ECG: CPT | Performed by: INTERNAL MEDICINE

## 2024-10-28 ENCOUNTER — CLINICAL SUPPORT (OUTPATIENT)
Dept: CARDIAC REHAB | Facility: HOSPITAL | Age: 44
End: 2024-10-28
Payer: COMMERCIAL

## 2024-10-28 DIAGNOSIS — Z95.1 STATUS POST CORONARY ARTERY BYPASS GRAFT: ICD-10-CM

## 2024-10-28 PROCEDURE — 93798 PHYS/QHP OP CAR RHAB W/ECG: CPT | Performed by: INTERNAL MEDICINE

## 2024-10-30 ENCOUNTER — TELEPHONE (OUTPATIENT)
Dept: CARDIOLOGY | Facility: CLINIC | Age: 44
End: 2024-10-30

## 2024-10-30 ENCOUNTER — CLINICAL SUPPORT (OUTPATIENT)
Dept: CARDIAC REHAB | Facility: HOSPITAL | Age: 44
End: 2024-10-30
Payer: COMMERCIAL

## 2024-10-30 DIAGNOSIS — I10 ESSENTIAL HYPERTENSION, BENIGN: ICD-10-CM

## 2024-10-30 DIAGNOSIS — Z95.1 STATUS POST CORONARY ARTERY BYPASS GRAFT: ICD-10-CM

## 2024-10-30 PROCEDURE — 93798 PHYS/QHP OP CAR RHAB W/ECG: CPT | Performed by: INTERNAL MEDICINE

## 2024-10-31 RX ORDER — METOPROLOL SUCCINATE 100 MG/1
100 TABLET, EXTENDED RELEASE ORAL DAILY
Qty: 90 TABLET | Refills: 3 | Status: SHIPPED | OUTPATIENT
Start: 2024-10-31 | End: 2025-10-31

## 2024-11-06 ENCOUNTER — CLINICAL SUPPORT (OUTPATIENT)
Dept: CARDIAC REHAB | Facility: HOSPITAL | Age: 44
End: 2024-11-06
Payer: COMMERCIAL

## 2024-11-06 DIAGNOSIS — Z95.1 STATUS POST CORONARY ARTERY BYPASS GRAFT: ICD-10-CM

## 2024-11-06 PROCEDURE — 93798 PHYS/QHP OP CAR RHAB W/ECG: CPT | Performed by: INTERNAL MEDICINE

## 2024-11-07 NOTE — PROGRESS NOTES
"INDIVIDUAL CARDIAC TREATMENT PLAN-30 Day ASSESSMENT     Name: Montana Kern   Today's Date: 24   : 1980    Primary Provider: Dr. Muller  MRN: [id number]    Referring Physician: Dr. Guevara     Diagnosis: CABG    Onset Date: 2024      Risk Stratification: Moderate      NUTRITION ASSESSMENT  Lipids:   Lipid Lab Date: 2024  Total Chol:153  HDL:51  LDL:84  Tri  Cholesterol Med: LIPITOR    Diabetes: Yes  HgbA1c: 8.0  Date Checked: 24  Monitors glucose at home: Yes  Fasting Blood Sugar Range:140-152  Frequency: once day  Hypoglycemic Episode: denies    Weight Management  Weight: 216 lbs  Height: 5'9\"  BMI:  31.9  Current Diet: Regular  Barriers to dietary change: none    Initial Dietary Assessment Score: 71%  Discharge Dietary Assessment Score:       NUTRITION PLAN  Nutrition Goals:   1. Improve Picture Your Plate assessment results by discharge.  2. Make changes to diet to include heart healthy options while in the program.    Nutrition Intervention/Education:   Cardiac rehab diabetic guidelines given at orientation.  Patient obtaining his weight at each session of cardiac rehab.  Patient attended group dietary class with the dietician discussing heart healthy diet and low sodium diet.  He attended class with the CHF navigator and discussed reading labels and low sodium diet.      OTHER CORE COMPONENTS/ RISK FACTORS ASSESSMENT  Medication compliance: good compliance  Using pill box: Yes  Carries medication list: No    Blood Pressure Management:  History of High BP: Yes  Resting BP: 143/90    Tobacco: NEVER  Anyone in the house smoke: No    Initial Knowledge Test Score: 7/15  Discharge Knowledge Test Score:    OTHER CORE COMPONENTS/ RISK FACTOR PLAN   Other Core components/Risk Factor Goals:                                                                                                                                                    1. Achieve and maintain a resting blood pressure less " than 130/80 while in the program.  2. Gain knowledge of cardiac disease and lifestyle modifications related to exercise and ADL's prior to discharge.    Other Components/ Risk Factors Intervention/Education:  Obtaining patient's BP, HR, and Spo2 at each session.  Patient attended class on CHF with the CHF Navigator and discussed S/S , Notifying the Doctor, and obtaining daily weights.  Patient attended behavior change and discussed making SMART goals.  Patient's BP has been elevated with his systolic in the 130-150's and diastolic 80-90's. Doctor Notification sent on 10/21/2024. Patient reports on 11/06/24 that his metoprolol was increased to 100 mg.      PSYCHOSOCIAL ASSESSMENT  Patient reported stress level: mild  Using stress management skills: Yes  HX of anxiety: No  HX of depression: No  Patient reported any new stress, depression and anxiety symptoms: Yes    Family/Support System: wife  Seeing mental health provider: No  Psychosocial medications: none    Initial PHQ-9 score: 0  [place] PHQ-9 score:  Discharge PHQ-9 score:   Was PHQ-9 sent to provider: No  Date sent:    Quality of Life Survey: SF-36 Pre Post   Physical Component Score 46.22 TBD   Mental Component Score 62.25 TBD     Stages of change:  Preparation    PSYCHOSOCIAL PLAN  Psychosocial Goals:  1. Improve stage of change while in the program.  2.  To decrease or maintain PHQ-9 survey score by discharge.  3. To learn stress management techniques while in the program.    Psychosocial Interventions/ Education:  Patient scored a 0 on his initial PHQ-9 survey.  Discussed feelings associated with behavior change and discussed risk factor reduction.  Patient encouraged to attend stress class.    EXERCISE ASSESSMENT  Home Exercise: No  Frequency:   Mode: no equipment    EXERCISE PLAN  Exercise Goals:   1. Goal of increasing METs 5-10% each week or as tolerated.  2. Have a plan in place for continued exercise after the program by discharge.  3. To exercise  30-45 minutes duration.  Exercise Prescription:   Frequency: 3 days per week  Duration (total aerobic min.): 30-40 minutes  Intensity RPE: 11-14  Target HR:   MET Level Range: 2.3-3.5     Modality METS Load  Duration   1 Warm Up    05:00   2 Treadmill 3.1 2 MPH 2% 15:00   3 NuStep 3.5 108 cruz 7 LV 15:00   4 Recumbent 2.3 6 cruz 2 LV 10:00   5        6 Education    10:00   7 Cool Down     05:00     Exercise Intervention/Education:   Patient exercising 35-40 minutes between equipment.  Patient increasing his METS 5-10% each week or as tolerated.  He is trying new modalities.    Date of first exercise session: 10/14/2024     LEARNING ASSESSMENT & BARRIERS  Readiness to Learn:  Barriers: None  Comments:    FALL RISK  low  Comments:        INDIVIDUAL PATIENT GOALS:  To get my strength back  2.    To get back to my normal activities.        MEDICATIONS  Current Outpatient Medications   Medication Instructions    aspirin 81 mg, oral, Daily    atorvastatin (LIPITOR) 80 mg, oral, Daily    gabapentin (NEURONTIN) 300 mg, oral, 3 times daily    glimepiride (Amaryl) 4 mg tablet TAKE 1/2 TABLET BY MOUTH TWICE DAILY BEFORE A MEAL AND 1 TABLET BY MOUTH BEFORE DINNER    losartan (COZAAR) 25 mg, oral, Daily    metFORMIN (GLUCOPHAGE) 1,000 mg, oral, 2 times daily (morning and late afternoon)    metoprolol succinate XL (TOPROL-XL) 100 mg, oral, Daily, Do not crush or chew.    Ozempic 1 mg, subcutaneous, Once Weekly, Wednesday                STAFF COMMENTS: Patient reports no angina with exercise. He is exercising 35-40 minutes between the treadmill and nustep. He is increasing his resistance/speed each week as tolerated. Patient's Bp has been elevated with his systolic 130's-150's and diastolic 80-90's. Doctor Notification sent on 10/21/2024. Patient reports on 11/06/2024 his metoprolol was increased to 100mg. Patient's Spo2 has been 94% or greater on room air with exercise. He attended group dietary class with the dietician  and discussed heart healthy eating and loiw sodium diet.    Education:  Diabetic guidelines  Group Dietician/heart healthy/low sodium  CHF  Behavior change

## 2024-11-08 ENCOUNTER — CLINICAL SUPPORT (OUTPATIENT)
Dept: CARDIAC REHAB | Facility: HOSPITAL | Age: 44
End: 2024-11-08
Payer: COMMERCIAL

## 2024-11-08 DIAGNOSIS — Z95.1 STATUS POST CORONARY ARTERY BYPASS GRAFT: ICD-10-CM

## 2024-11-08 PROCEDURE — 93798 PHYS/QHP OP CAR RHAB W/ECG: CPT | Performed by: INTERNAL MEDICINE

## 2024-11-11 ENCOUNTER — TELEPHONE (OUTPATIENT)
Dept: CARDIOLOGY | Facility: CLINIC | Age: 44
End: 2024-11-11

## 2024-11-11 ENCOUNTER — CLINICAL SUPPORT (OUTPATIENT)
Dept: CARDIAC REHAB | Facility: HOSPITAL | Age: 44
End: 2024-11-11
Payer: COMMERCIAL

## 2024-11-11 DIAGNOSIS — Z95.1 STATUS POST CORONARY ARTERY BYPASS GRAFT: ICD-10-CM

## 2024-11-11 PROCEDURE — 93798 PHYS/QHP OP CAR RHAB W/ECG: CPT | Performed by: INTERNAL MEDICINE

## 2024-11-11 NOTE — LETTER
Montana Kern  MRN [id number]   1980,       To whom it may concern,     Mr Kern is a patient of mine being followed closely status post Coronary Artery Bypass Grafting with 3 vessels on 2024. This patient has been seen status post surgery with Cardio Thoracic team as well as myself on 2024 and is compliant with cardiac rehab and doing well.  Montana Kern can return to work on 2024 at previous capacity with no new restrictions. Should you have any concerns or questions, please direct to my office. [phone].           The Office of Dr. Rome Guevara MD [facility]

## 2024-11-11 NOTE — TELEPHONE ENCOUNTER
Patient walk in:    Patient came in for a return to work note. He stated he had heart surgery and it will be 12 weeks post op on 11/20. Patient would like to return to work on 11/20 full duty with no restrictions. Please call patient when ready as he would like to pick letter up in office. Routed to Bahman Calderon RN

## 2024-11-12 NOTE — TELEPHONE ENCOUNTER
Patient completed CABG x3  on 8/23/24 with Dr. Zhao.   Seen in follow up with Thoracic Surgery on 9/25/24.     Seen with Dr. Rome Guevara MD [facility] on 10/8/2024:  Montana Kern is a 44 y.o. year old male patient is here for follow-up following his coronary bypass surgery.  Is about 4 weeks now postop.  He has not had any issues postoperatively.  He started to drive.  He saw the cardiac surgeon and apparently is still restricted and the amount of weight he can lift.  Scheduled to go to cardiac rehab program.  He complain of occasional episode of palpitation with short runs of rapid heartbeats.  I told the patient we want to rule out paroxysmal atrial fibrillation therefore we will go ahead with 7 days Holter monitor.  Based on the above further recommendation will be made.     Patient completed 72 hour Zio in October- no Afib on monitor, PVC burden <1%. Increased Metoprolol to 100mg Daily at that time.     I drafted return to work letter with above information, placed for Dr. Rome Guevara MD [facility] to review today.

## 2024-11-12 NOTE — TELEPHONE ENCOUNTER
Dr. Rome Guevara MD [facility] signed letter as drafted.     Notified patient letter is available for . He will come tomorrow to get.   Placed at  window.

## 2024-11-13 ENCOUNTER — CLINICAL SUPPORT (OUTPATIENT)
Dept: CARDIAC REHAB | Facility: HOSPITAL | Age: 44
End: 2024-11-13
Payer: COMMERCIAL

## 2024-11-13 DIAGNOSIS — Z95.1 STATUS POST CORONARY ARTERY BYPASS GRAFT: ICD-10-CM

## 2024-11-13 PROCEDURE — 93798 PHYS/QHP OP CAR RHAB W/ECG: CPT | Performed by: INTERNAL MEDICINE

## 2024-11-15 ENCOUNTER — CLINICAL SUPPORT (OUTPATIENT)
Dept: CARDIAC REHAB | Facility: HOSPITAL | Age: 44
End: 2024-11-15
Payer: COMMERCIAL

## 2024-11-15 DIAGNOSIS — Z95.1 STATUS POST CORONARY ARTERY BYPASS GRAFT: ICD-10-CM

## 2024-11-15 PROCEDURE — 93798 PHYS/QHP OP CAR RHAB W/ECG: CPT | Performed by: INTERNAL MEDICINE

## 2024-11-19 ENCOUNTER — APPOINTMENT (OUTPATIENT)
Dept: RADIOLOGY | Facility: CLINIC | Age: 44
End: 2024-11-19
Payer: COMMERCIAL

## 2024-11-23 ENCOUNTER — DOCUMENTATION (OUTPATIENT)
Dept: PRIMARY CARE | Facility: CLINIC | Age: 44
End: 2024-11-23
Payer: COMMERCIAL

## 2024-11-23 NOTE — PROGRESS NOTES
RESPONDED TO ON CALL MESSAGE ([facility] [employer]IA [employer]): I called and talked to patient. Patient states he did not call  hotline for help.

## 2024-11-25 DIAGNOSIS — E11.9 TYPE 2 DIABETES MELLITUS TREATED WITHOUT INSULIN (MULTI): ICD-10-CM

## 2024-11-25 RX ORDER — GLIMEPIRIDE 4 MG/1
TABLET ORAL
Qty: 180 TABLET | Refills: 3 | Status: SHIPPED | OUTPATIENT
Start: 2024-11-25

## 2024-11-25 NOTE — TELEPHONE ENCOUNTER
Rx Refill Request Telephone Encounter    Name:  Montana Kern  :  [id number]  Medication Name:  glimepiride (Amaryl) 4 mg   Specific Pharmacy location:  [facility]  Date of last appointment:    Date of next appointment:  NA  Best number to reach patient:  [phone]

## 2024-12-03 ENCOUNTER — APPOINTMENT (OUTPATIENT)
Dept: CARDIAC SURGERY | Facility: CLINIC | Age: 44
End: 2024-12-03
Payer: COMMERCIAL

## 2024-12-27 ENCOUNTER — APPOINTMENT (OUTPATIENT)
Dept: CARDIAC REHAB | Facility: HOSPITAL | Age: 44
End: 2024-12-27
Payer: COMMERCIAL

## 2024-12-30 ENCOUNTER — APPOINTMENT (OUTPATIENT)
Dept: CARDIAC REHAB | Facility: HOSPITAL | Age: 44
End: 2024-12-30
Payer: COMMERCIAL

## 2025-01-03 ENCOUNTER — APPOINTMENT (OUTPATIENT)
Dept: CARDIAC REHAB | Facility: HOSPITAL | Age: 45
End: 2025-01-03
Payer: COMMERCIAL

## 2025-01-06 ENCOUNTER — APPOINTMENT (OUTPATIENT)
Dept: CARDIAC REHAB | Facility: HOSPITAL | Age: 45
End: 2025-01-06
Payer: COMMERCIAL

## 2025-01-07 ENCOUNTER — APPOINTMENT (OUTPATIENT)
Dept: CARDIAC SURGERY | Facility: CLINIC | Age: 45
End: 2025-01-07
Payer: COMMERCIAL

## 2025-01-07 ENCOUNTER — APPOINTMENT (OUTPATIENT)
Dept: CARDIOLOGY | Facility: CLINIC | Age: 45
End: 2025-01-07
Payer: COMMERCIAL

## 2025-01-07 ENCOUNTER — APPOINTMENT (OUTPATIENT)
Dept: RADIOLOGY | Facility: CLINIC | Age: 45
End: 2025-01-07
Payer: COMMERCIAL

## 2025-01-08 ENCOUNTER — APPOINTMENT (OUTPATIENT)
Dept: CARDIAC REHAB | Facility: HOSPITAL | Age: 45
End: 2025-01-08
Payer: COMMERCIAL

## 2025-01-10 ENCOUNTER — APPOINTMENT (OUTPATIENT)
Dept: CARDIAC REHAB | Facility: HOSPITAL | Age: 45
End: 2025-01-10
Payer: COMMERCIAL

## 2025-01-24 ENCOUNTER — APPOINTMENT (OUTPATIENT)
Dept: RADIOLOGY | Facility: CLINIC | Age: 45
End: 2025-01-24
Payer: COMMERCIAL

## 2025-01-28 ENCOUNTER — OFFICE VISIT (OUTPATIENT)
Dept: CARDIOLOGY | Facility: CLINIC | Age: 45
End: 2025-01-28
Payer: COMMERCIAL

## 2025-01-28 VITALS
SYSTOLIC BLOOD PRESSURE: 118 MMHG | HEART RATE: 70 BPM | BODY MASS INDEX: 32.29 KG/M2 | DIASTOLIC BLOOD PRESSURE: 70 MMHG | WEIGHT: 218 LBS | HEIGHT: 69 IN

## 2025-01-28 DIAGNOSIS — R00.2 PALPITATIONS: ICD-10-CM

## 2025-01-28 DIAGNOSIS — I25.10 CORONARY ARTERY DISEASE INVOLVING NATIVE CORONARY ARTERY OF NATIVE HEART WITHOUT ANGINA PECTORIS: ICD-10-CM

## 2025-01-28 DIAGNOSIS — E78.5 DYSLIPIDEMIA: ICD-10-CM

## 2025-01-28 DIAGNOSIS — E11.9 TYPE 2 DIABETES MELLITUS TREATED WITHOUT INSULIN (MULTI): ICD-10-CM

## 2025-01-28 DIAGNOSIS — Z95.1 S/P CABG X 3: ICD-10-CM

## 2025-01-28 DIAGNOSIS — Z78.9 NEVER SMOKED CIGARETTES: ICD-10-CM

## 2025-01-28 DIAGNOSIS — I10 ESSENTIAL HYPERTENSION, BENIGN: ICD-10-CM

## 2025-01-28 NOTE — PATIENT INSTRUCTIONS
Follow up office visit in 6 months.  Continue same medications/treatment.  Patient educated on proper medication use.  Patient educated on risk factor modification.  Please bring any lab results from other providers / physicians to your next appointment.    Please bring all medicines, vitamins and herbal supplements with you when you come to the office.    Prescriptions will not be filled unless you are compliant with your follow up appointments or have a follow up  appointment scheduled as per instruction of your physician.  Refills should be requested at the time of  Your visit.    Fanny SILVER LPN, am scribing for and in the presence of  Dr. Rome Guevara MD, FACC

## 2025-01-28 NOTE — PROGRESS NOTES
Referred by Dr. Suarez ref. provider found provider found for   Chief Complaint   Patient presents with    Follow-up     3 month         History of Present Illness  Montana Kern is a 44 y.o. year old male patient is here for follow-up status post his surgery with ligation of left atrial (.  Doing well from a cardiac standpoint no complaint no symptoms of chest pain or shortness of breath.  Denies any symptoms of palpitations syncope or presyncope.  Discussed with the patient we will continue medication will call for any problem and follow-up as scheduled    Past Medical History  Past Medical History:   Diagnosis Date    Acute bronchitis due to other specified organisms 11/26/2020    Acute bronchitis due to other specified organisms    Angina pectoris, unstable (Multi) 07/19/2024    Angina, class III (CMS-HCC) 07/19/2024    Asthma     BMI 30.0-30.9,adult 07/19/2024    Diabetes (Multi)     type 2    Dyslipidemia 08/01/2023    Essential hypertension, benign 08/01/2023    Exertional chest pain 06/18/2024    Hyperlipidemia     Hypertension     Kidney stones     passed on own    Osteoarthritis     ankles    Personal history of other specified conditions 11/26/2020    History of persistent cough    S/P CABG x 3 09/12/2024    Type 2 diabetes mellitus treated without insulin (Multi) 08/01/2023       Social History  Social History     Tobacco Use    Smoking status: Never    Smokeless tobacco: Never   Vaping Use    Vaping status: Never Used   Substance Use Topics    Alcohol use: Yes     Comment: occasionally    Drug use: Never       Family History     Family History   Problem Relation Name Age of Onset    Heart disease Mother      Stroke Father      Heart attack Father         Review of Systems  As per HPI, all other systems reviewed and negative.    Allergies:  No Known Allergies     Outpatient Medications:  Current Outpatient Medications   Medication Instructions    aspirin 81 mg, Daily    atorvastatin (LIPITOR) 80 mg, oral,  Daily    gabapentin (NEURONTIN) 300 mg, oral, 3 times daily    glimepiride (Amaryl) 4 mg tablet TAKE 1/2 TABLET BY MOUTH TWICE DAILY BEFORE A MEAL AND 1 TABLET BY MOUTH BEFORE DINNER    losartan (COZAAR) 25 mg, oral, Daily    metFORMIN (GLUCOPHAGE) 1,000 mg, oral, 2 times daily (morning and late afternoon)    metoprolol succinate XL (TOPROL-XL) 100 mg, oral, Daily, Do not crush or chew.    Ozempic 1 mg, subcutaneous, Once Weekly, Wednesday         Vitals:  Vitals:    01/28/25 1508   BP: 118/70   Pulse: 70       Physical Exam:  Physical Exam  Vitals and nursing note reviewed.   Constitutional:       Appearance: Normal appearance.   HENT:      Head: Normocephalic and atraumatic.   Eyes:      Extraocular Movements: Extraocular movements intact.      Pupils: Pupils are equal, round, and reactive to light.   Cardiovascular:      Rate and Rhythm: Normal rate and regular rhythm.      Pulses: Normal pulses.   Pulmonary:      Effort: Pulmonary effort is normal.      Breath sounds: Normal breath sounds.   Musculoskeletal:         General: Normal range of motion.      Cervical back: Normal range of motion.      Right lower leg: No edema.      Left lower leg: No edema.   Skin:     General: Skin is warm and dry.   Neurological:      General: No focal deficit present.      Mental Status: He is alert and oriented to person, place, and time.             Assessment/Plan   Diagnoses and all orders for this visit:  Palpitations  Coronary artery disease involving native coronary artery of native heart without angina pectoris  S/P CABG x 3  Essential hypertension, benign  Dyslipidemia  Type 2 diabetes mellitus treated without insulin (Multi)  BMI 32.0-32.9,adult  Never smoked cigarettes          Rome Guevara MD [facility]  Interventional Cardiology   of [facility]     Thank you for allowing me to participate in the care of this patient. Please do not hesitate to contact me with any further questions or concerns.

## 2025-01-31 ENCOUNTER — HOSPITAL ENCOUNTER (OUTPATIENT)
Dept: RADIOLOGY | Facility: CLINIC | Age: 45
Discharge: HOME | End: 2025-01-31
Payer: COMMERCIAL

## 2025-01-31 VITALS
HEART RATE: 67 BPM | SYSTOLIC BLOOD PRESSURE: 144 MMHG | BODY MASS INDEX: 32.29 KG/M2 | HEIGHT: 69 IN | RESPIRATION RATE: 16 BRPM | WEIGHT: 218 LBS | OXYGEN SATURATION: 97 % | DIASTOLIC BLOOD PRESSURE: 91 MMHG

## 2025-01-31 DIAGNOSIS — Z00.6 RESEARCH STUDY PATIENT: ICD-10-CM

## 2025-01-31 DIAGNOSIS — Z95.818 PRESENCE OF LEFT ATRIAL APPENDAGE CLOSURE DEVICE: ICD-10-CM

## 2025-01-31 LAB
CREAT SERPL-MCNC: 0.8 MG/DL (ref 0.6–1.3)
GFR SERPL CREATININE-BSD FRML MDRD: >90 ML/MIN/1.73M*2

## 2025-01-31 PROCEDURE — 75572 CT HRT W/3D IMAGE: CPT

## 2025-01-31 PROCEDURE — 2500000004 HC RX 250 GENERAL PHARMACY W/ HCPCS (ALT 636 FOR OP/ED): Performed by: INTERNAL MEDICINE

## 2025-01-31 PROCEDURE — 82565 ASSAY OF CREATININE: CPT

## 2025-01-31 PROCEDURE — 2550000001 HC RX 255 CONTRASTS: Performed by: THORACIC SURGERY (CARDIOTHORACIC VASCULAR SURGERY)

## 2025-01-31 RX ADMIN — IOHEXOL 70 ML: 350 INJECTION, SOLUTION INTRAVENOUS at 14:06

## 2025-01-31 RX ADMIN — SODIUM CHLORIDE 500 ML: 900 INJECTION, SOLUTION INTRAVENOUS at 13:36

## 2025-02-04 ENCOUNTER — APPOINTMENT (OUTPATIENT)
Dept: CARDIAC SURGERY | Facility: CLINIC | Age: 45
End: 2025-02-04
Payer: COMMERCIAL

## 2025-02-10 DIAGNOSIS — I25.119 CORONARY ARTERY DISEASE INVOLVING NATIVE CORONARY ARTERY OF NATIVE HEART WITH ANGINA PECTORIS: Primary | ICD-10-CM

## 2025-02-10 DIAGNOSIS — E78.5 DYSLIPIDEMIA: ICD-10-CM

## 2025-02-10 RX ORDER — ATORVASTATIN CALCIUM 80 MG/1
80 TABLET, FILM COATED ORAL DAILY
Qty: 30 TABLET | Refills: 3 | Status: SHIPPED | OUTPATIENT
Start: 2025-02-10 | End: 2025-06-10

## 2025-02-10 NOTE — TELEPHONE ENCOUNTER
Rx Refill Request Telephone Encounter    Name:  Montana Kern  :  [id number]  Medication Name:  atorvastatin (Lipitor) 80 mg   Specific Pharmacy location:  [facility]   Date of last appointment:  2024  Date of next appointment:  NA  Best number to reach patient:  [phone]

## 2025-02-11 ENCOUNTER — APPOINTMENT (OUTPATIENT)
Dept: CARDIAC SURGERY | Facility: CLINIC | Age: 45
End: 2025-02-11
Payer: COMMERCIAL

## 2025-02-18 ENCOUNTER — TELEMEDICINE (OUTPATIENT)
Dept: CARDIAC SURGERY | Facility: CLINIC | Age: 45
End: 2025-02-18
Payer: COMMERCIAL

## 2025-02-18 DIAGNOSIS — E11.9 TYPE 2 DIABETES MELLITUS TREATED WITHOUT INSULIN (MULTI): ICD-10-CM

## 2025-02-18 DIAGNOSIS — I10 HYPERTENSION, UNSPECIFIED TYPE: ICD-10-CM

## 2025-02-18 DIAGNOSIS — E78.5 DYSLIPIDEMIA: ICD-10-CM

## 2025-02-18 DIAGNOSIS — Z95.1 S/P CABG (CORONARY ARTERY BYPASS GRAFT): Primary | ICD-10-CM

## 2025-02-18 RX ORDER — SEMAGLUTIDE 1.34 MG/ML
INJECTION, SOLUTION SUBCUTANEOUS
Qty: 9 ML | Refills: 2 | Status: SHIPPED | OUTPATIENT
Start: 2025-02-18

## 2025-02-18 SDOH — ECONOMIC STABILITY: FOOD INSECURITY: WITHIN THE PAST 12 MONTHS, YOU WORRIED THAT YOUR FOOD WOULD RUN OUT BEFORE YOU GOT MONEY TO BUY MORE.: NEVER TRUE

## 2025-02-18 SDOH — ECONOMIC STABILITY: FOOD INSECURITY: WITHIN THE PAST 12 MONTHS, THE FOOD YOU BOUGHT JUST DIDN'T LAST AND YOU DIDN'T HAVE MONEY TO GET MORE.: NEVER TRUE

## 2025-02-18 ASSESSMENT — LIFESTYLE VARIABLES
HOW OFTEN DO YOU HAVE A DRINK CONTAINING ALCOHOL: MONTHLY OR LESS
SKIP TO QUESTIONS 9-10: 1
AUDIT-C TOTAL SCORE: 1
HOW OFTEN DO YOU HAVE SIX OR MORE DRINKS ON ONE OCCASION: NEVER
HOW MANY STANDARD DRINKS CONTAINING ALCOHOL DO YOU HAVE ON A TYPICAL DAY: 1 OR 2

## 2025-02-18 ASSESSMENT — ENCOUNTER SYMPTOMS
DEPRESSION: 0
LOSS OF SENSATION IN FEET: 0
OCCASIONAL FEELINGS OF UNSTEADINESS: 0

## 2025-02-18 ASSESSMENT — PAIN SCALES - GENERAL: PAINLEVEL_OUTOF10: 0-NO PAIN

## 2025-02-18 NOTE — TELEPHONE ENCOUNTER
Rx Refill Request     Name: Montana Kern  :  1980   Medication Name:  semaglutide (Ozempic) 1 mg/dose   Specific Pharmacy location:  [facility] - [place], [place]   Date of last appointment:  2024   Date of next appointment:  Visit date not found   Best number to reach patient:  [phone]

## 2025-02-18 NOTE — PROGRESS NOTES
Chief Complaint  Surveillance visit    HPI:   Mr. Montana Kern is a 44 y.o. male, who presents for post-operative evaluation.   He is now 3 month out from his operation, and is recovering nicely.  He has resumed normal activities and his appetite is returned to normal.  He has no chest pain, no shortness of breath and denies palpitations, dizziness, or syncope.  He denies any difficulties with speech, balance or any stroke like symptoms.  He is back to work, and completed cardiopulmonary rehab.  This visit is for the CLIP-IT trial.     Past Medical History:   Diagnosis Date    Acute bronchitis due to other specified organisms 11/26/2020    Acute bronchitis due to other specified organisms    Angina pectoris, unstable (Multi) 07/19/2024    Angina, class III (CMS-HCC) 07/19/2024    Asthma     BMI 30.0-30.9,adult 07/19/2024    Diabetes (Multi)     type 2    Dyslipidemia 08/01/2023    Essential hypertension, benign 08/01/2023    Exertional chest pain 06/18/2024    Hyperlipidemia     Hypertension     Kidney stones     passed on own    Osteoarthritis     ankles    Personal history of other specified conditions 11/26/2020    History of persistent cough    S/P CABG x 3 09/12/2024    Type 2 diabetes mellitus treated without insulin (Multi) 08/01/2023       Past Surgical History:   Procedure Laterality Date    CARDIAC CATHETERIZATION N/A 07/26/2024    Procedure: Left Heart Cath;  Surgeon: Rome Guevara MD;  Location: [facility];  Service: Cardiovascular;  Laterality: N/A;  on Aspirin and Metoprolol, hold diabetic meds morning of    CARDIAC SURGERY  08/2024    open heart    HERNIA REPAIR      x 3    MULTIPLE TOOTH EXTRACTIONS      VASECTOMY         Family History   Problem Relation Name Age of Onset    Heart disease Mother      Stroke Father      Heart attack Father         Social History     Socioeconomic History    Marital status:      Spouse name: Not on file    Number of children: Not on file    Years  of education: Not on file    Highest education level: Not on file   Occupational History    Not on file   Tobacco Use    Smoking status: Never    Smokeless tobacco: Never   Vaping Use    Vaping status: Never Used   Substance and Sexual Activity    Alcohol use: Yes     Comment: occasionally    Drug use: Never    Sexual activity: Defer   Other Topics Concern    Not on file   Social History Narrative    Not on file     Social Drivers of Health     Financial Resource Strain: Low Risk  (8/24/2024)    Overall Financial Resource Strain (CARDIA)     Difficulty of Paying Living Expenses: Not hard at all   Food Insecurity: No Food Insecurity (2/18/2025)    Hunger Vital Sign     Worried About Running Out of Food in the Last Year: Never true     Ran Out of Food in the Last Year: Never true   Transportation Needs: No Transportation Needs (8/24/2024)    PRAPARE - Transportation     Lack of Transportation (Medical): No     Lack of Transportation (Non-Medical): No   Physical Activity: Not on file   Stress: Not on file   Social Connections: Not on file   Intimate Partner Violence: Not on file   Housing Stability: Low Risk  (8/24/2024)    Housing Stability Vital Sign     Unable to Pay for Housing in the Last Year: No     Number of Times Moved in the Last Year: 0     Homeless in the Last Year: No       No Known Allergies    Outpatient Encounter Medications as of 2/18/2025   Medication Sig Dispense Refill    aspirin 81 mg EC tablet Take 1 tablet (81 mg) by mouth once daily.      atorvastatin (Lipitor) 80 mg tablet Take 1 tablet (80 mg) by mouth once daily. 30 tablet 3    gabapentin (Neurontin) 300 mg capsule Take 1 capsule (300 mg) by mouth 3 times a day. 270 capsule 3    glimepiride (Amaryl) 4 mg tablet TAKE 1/2 TABLET BY MOUTH TWICE DAILY BEFORE A MEAL AND 1 TABLET BY MOUTH BEFORE DINNER 180 tablet 3    losartan (Cozaar) 25 mg tablet Take 1 tablet (25 mg) by mouth once daily. 90 tablet 1    metFORMIN (Glucophage) 1,000 mg tablet Take  1 tablet (1,000 mg) by mouth 2 times a day with meals. 180 tablet 3    metoprolol succinate XL (Toprol-XL) 100 mg 24 hr tablet Take 1 tablet (100 mg) by mouth once daily. Do not crush or chew. 90 tablet 3    Ozempic 1 mg/dose (4 mg/3 mL) pen injector INJECT 1 MG UNDER THE SKIN ONCE WEEKLY ON WEDNESDAY 9 mL 2    [DISCONTINUED] semaglutide (Ozempic) 1 mg/dose (4 mg/3 mL) pen injector Inject 1 mg under the skin 1 (one) time per week. Wednesday 9 mL 2     No facility-administered encounter medications on file as of 2/18/2025.       Physical Exam  Neurological:      Mental Status: He is alert and oriented to person, place, and time.   Psychiatric:         Mood and Affect: Mood normal.         Encounter Date: 08/23/24   Electrocardiogram, 12-lead PRN ACS symptoms   Result Value    Systolic blood pressure 120    Diastolic blood pressure 71    Ventricular Rate 88    Atrial Rate 88    FL Interval 148    QRS Duration 94    QT Interval 358    QTC Calculation(Bazett) 433    P Axis 69    R Axis 47    T Axis -13    QRS Count 14    Q Onset 212    P Onset 138    P Offset 191    T Offset 391    QTC Fredericia 406    Narrative    Normal sinus rhythm  Possible Inferior infarct , age undetermined  Abnormal ECG  When compared with ECG of 24-AUG-2024 01:19, (unconfirmed)  Borderline criteria for Inferior infarct are now Present  Inverted T waves have replaced nonspecific T wave abnormality in Inferior leads  Nonspecific T wave abnormality now evident in Anterolateral leads  Confirmed by Fahad Sood ([id number]) on 9/3/2024 8:40:20 AM     NYHA Score: 1  [id number] score:  3    CT Watchman 1/31/25: 5 mm stump on the ASHANTI with penditure device.     Assessment and Plan:    Mr. Montana Kern is a 44 y.o. male, who is recovering well after surgery.   He will continue cardiovascular management with their cardiologist.  I am always happy to see them for any reason.  I will plan on another visit with him in August at one year for the Clip-IT  study.

## 2025-02-26 ENCOUNTER — OFFICE VISIT (OUTPATIENT)
Dept: PRIMARY CARE | Facility: CLINIC | Age: 45
End: 2025-02-26
Payer: COMMERCIAL

## 2025-02-26 VITALS
HEART RATE: 75 BPM | TEMPERATURE: 96.8 F | HEIGHT: 69 IN | WEIGHT: 214 LBS | DIASTOLIC BLOOD PRESSURE: 78 MMHG | RESPIRATION RATE: 16 BRPM | SYSTOLIC BLOOD PRESSURE: 116 MMHG | OXYGEN SATURATION: 98 % | BODY MASS INDEX: 31.7 KG/M2

## 2025-02-26 DIAGNOSIS — Z95.1 S/P CABG X 3: Primary | ICD-10-CM

## 2025-02-26 DIAGNOSIS — E11.9 TYPE 2 DIABETES MELLITUS TREATED WITHOUT INSULIN (MULTI): ICD-10-CM

## 2025-02-26 DIAGNOSIS — I50.30 DIASTOLIC HEART FAILURE, UNSPECIFIED HF CHRONICITY: ICD-10-CM

## 2025-02-26 DIAGNOSIS — E11.69 TYPE 2 DIABETES MELLITUS WITH OTHER SPECIFIED COMPLICATION, WITHOUT LONG-TERM CURRENT USE OF INSULIN: ICD-10-CM

## 2025-02-26 DIAGNOSIS — I10 ESSENTIAL HYPERTENSION, BENIGN: ICD-10-CM

## 2025-02-26 DIAGNOSIS — E78.5 DYSLIPIDEMIA: ICD-10-CM

## 2025-02-26 RX ORDER — LOSARTAN POTASSIUM 25 MG/1
25 TABLET ORAL DAILY
Qty: 90 TABLET | Refills: 1 | Status: SHIPPED | OUTPATIENT
Start: 2025-02-26 | End: 2025-08-25

## 2025-02-26 RX ORDER — GABAPENTIN 300 MG/1
300 CAPSULE ORAL 3 TIMES DAILY
Qty: 270 CAPSULE | Refills: 3 | Status: SHIPPED | OUTPATIENT
Start: 2025-02-26

## 2025-02-26 RX ORDER — METFORMIN HYDROCHLORIDE 1000 MG/1
1000 TABLET ORAL
Qty: 180 TABLET | Refills: 3 | Status: SHIPPED | OUTPATIENT
Start: 2025-02-26

## 2025-02-26 NOTE — PROGRESS NOTES
"Subjective   Patient ID: Montana Kern is a 44 y.o. male who presents for Diabetes Mellitus (5 month follow up ).  HPI    Review of Systems    Objective   /78 (BP Location: Right arm, Patient Position: Sitting, BP Cuff Size: Large adult)   Pulse 75   Temp 36 °C (96.8 °F) (Temporal)   Resp 16   Ht 1.753 m (5' 9\")   Wt 97.1 kg (214 lb)   SpO2 98%   BMI 31.60 kg/m²     OCT CREATININE AND GFR  Order: [id number]   Status: Final result       Visible to patient: Yes (seen)       Next appt: 04/30/2025 at 04:15 PM in Primary Care (Leonardo Muller DO)    0 Result Notes      Component  Ref Range & Units 1 mo ago   POCT Creatinine  0.60 - 1.30 mg/dL 0.80   Comment: Hydroxyurea can cause significant interference with creatinin      sujey  Order: [id number]   Status: Final result       Visible to patient: Yes (not seen)    0 Result Notes            Component  Ref Range & Units 6 mo ago  (8/27/24) 6 mo ago  (8/26/24) 6 mo ago  (8/25/24) 6 mo ago  (8/24/24) 6 mo ago  (8/23/24) 6 mo ago  (8/6/24) 7 mo ago  (7/22/24)   Glucose  74 - 99 mg/dL 145 High  150 High  139 High  181 High  130 High  288 High  250 High    Sodium  136 - 145 mmol/L 135 Low  133 Low  136 135 Low  139 138 137   Potassium  3.5 - 5.3 mmol/L 3.7 3.7 3.6 3.9 4.0 4.1 4.2   Chloride  98 - 107 mmol/L 100 99 102 105 109 High  102 101   Bicarbonate  21 - 32 mmol/L 28 27 28 24 24 29 29   Anion Gap  10 - 20 mmol/L 11 11 10 10 10 11 11   Urea Nitrogen  6 - 23 mg/dL 12 10 11 11 9 14 17   Creatinine  0.50 - 1.30 mg/dL 0.70 0.70 0.79 0.62 0.60 0.88 0.91   eGFR  >60 mL/min/1.73m*2 >90 >90 CM >90 CM >90 CM >90 CM >90 CM >90 CM   Comment: Calculations of estimated GFR are performed using the 2021 CKD-EPI Study Refit equation without the race variable for the IDMS-Traceable creatinine methods.  https://jasn.asnjournals.org/content/early/2021/09/22/ASN.9260464675   Calcium  8.6 - 10.3 mg/dL 8.3 Low  8.3 Low  8.4 Low  7.8 Low  7.8 Low  9.5 9.0   Phosphorus  2.5 - 4.9 mg/dL " 2.8 2.7                  Interpretation Summary    3 days monitor were done.  The basic rhythm was sinus rhythm rhythm at an average heart rate of 94 bpm.  There was occasional sinus tachycardia.  There were rare isolated PVCs and rare isolated PACs.  There was no runs of ventricular or supraventricular tachycardia.  Final diagnosis  Rhythm with rate PVCs as described above        CONCLUSIONS:   1. Left ventricular ejection fraction is normal, by visual estimate at 55-60%.   2. The apex is hypokinetic particularly the septal aspect. Inferior aspect of the apex slightly hypokinetic as well.   3. There is normal right ventricular global systolic function.   4. The left atrium is mild to moderately dilated.   5. Mild aortic root dilatation at 4 cm mild ascending aorta dilatation at 4.1 cm.   6. Normal valve function.      Physical Exam        Assessment/Plan   Problem List Items Addressed This Visit       Type 2 diabetes mellitus treated without insulin (Multi)    Essential hypertension, benign      Renal function panel  Order: [id number]   Status: Final result       Visible to patient: Yes (not seen)    0 Result Notes   important suggestion  Newer results are available. Click to view them now.   tion Panel  Order: [id number]   Status: Final result       Visible to patient: Yes (not seen)    0 Result Notes            Component  Ref Range & Units 6 mo ago  (8/27/24) 6 mo ago  (8/26/24) 6 mo ago  (8/25/24) 6 mo ago  (8/24/24) 6 mo ago  (8/23/24) 6 mo ago  (8/6/24) 7 mo ago  (7/22/24)   Glucose  74 - 99 mg/dL 145 High  150 High  139 High  181 High  130 High  288 High  250 High    Sodium  136 - 145 mmol/L 135 Low  133 Low  136 135 Low  139 138 137   Potassium  3.5 - 5.3 mmol/L 3.7 3.7 3.6 3.9 4.0 4.1 4.2   Chloride  98 - 107 mmol/L 100 99 102 105 109 High  102 101   Bicarbonate  21 - 32 mmol/L 28 27 28 24 24 29 29   Anion Gap  10 - 20 mmol/L 11 11 10 10 10 11 11   Urea Nitrogen  6 - 23 mg/dL 12 10 11 11 9 14 17   Creatinine  0.50 - 1.30 mg/dL 0.70 0.70 0.79 0.62 0.60 0.88 0.91   eGFR  >60 mL/min/1.73m*2 >90                        Component  Ref Range & Units 6 mo ago  (8/24/24) 6 mo ago  (8/23/24) 6 mo ago  (8/6/24) 7 mo ago  (7/22/24) 1 yr ago  (12/21/23) 1 yr ago  (11/4/23) 1 yr ago  (8/31/23)   Glucose  74 - 99 mg/dL 181 High  130 High  288 High  250 High   146 High  104 High    Sodium  136 - 145 mmol/L 135 Low  139 138 137  141 140   Potassium  3.5 - 5.3 mmol/L 3.9 4.0 4.1 4.2  4.0 4.0   Chloride  98 - 107 mmol/L 105 109 High  102 101  106 104   Bicarbonate  21 - 32 mmol/L 24 24 29 29  29 29   Anion Gap  10 - 20 mmol/L 10 10 11 11  10 11   Urea Nitrogen  6 - 23 mg/dL 11 9 14 17  12 14   Creatinine  0.50 - 1.30 mg/dL 0.62 0.60 0.88 0.91 0.87 0.82 0.92   eGFR  >60 mL/min/1.73m*2 >90 >90 CM >90 CM >90 CM >90 CM >90 CM    Comment: Calculations of estimated GFR are performed using the 2021 CKD-EPI Study Refit equation without the race variable for the IDMS-Traceable creatinine  methods.  https://jasn.asnjournals.org/content/early/2021/09/22/ASN.9515574162   Calcium  8.6 - 10.3 mg/dL 7.8 Low  7.8 Low  9.5 9.0  9.0 8.8   Phosphorus  2.5 - 4.9 mg/dL 3.8 4.5 CM        Comment: The performance characteristics of phosphorus testing in heparinized plasma have been validated by the individual  laboratory site where testing is performed. Testing on heparinized plasma is not approved by the FDA; however, such approval is not necessary.   Albumin  3.4 - 5.0 g/dL 3.3 Low  3.4 4.7   4.0    [facility]ing Agency [facility]                   CONCLUSIONS:   1. Left ventricular ejection fraction is normal, by visual estimate at 55-60%.   2. The apex is hypokinetic particularly the septal aspect. Inferior aspect of the apex slightly hypokinetic as well.   3. There is normal right ventricular global systolic function.   4. The left atrium is mild to moderately dilated.   5. Mild aortic root dilatation at 4 cm mild ascending aorta dilatation at 4.1 cm.   6. Normal valve function.      Physical Exam  Vital sign reviewed and normal pulse ox good 97 heart is regular  Reviewed earlier about Holter monitor which is stable as well as echo.  Neck neck is all without mass adenopathy bruits rigidity  Cardiovascular RSR without significant murmurs gallop or ectopy  Pulmonary chest clear anteriorly and posteriorly  Peripheral vascular very minimally reduced vibration in the feet otherwise no symmetric or asymmetric edema no other sensory deficits no motor deficits.  Skin no rash petechiae or jaundice  Mood mood is stable anxiety depressive or cognitive issues    Assessment/Plan   Problem List Items Addressed This Visit       Type 2 diabetes mellitus treated without insulin (Multi)    Essential hypertension, benign   Did review importance of follow-up with his lab and he will get this soon he will continue taking above cardiac medicines  Will continue his metformin Amaryl as well as  Ozempic  Relates he is lost 3 pounds since starting Ozempic and cardiac wise is stable.  Follow-up in 2 months with previsit fasting CMP A1c lipid panel and urine albumin.  Continue the buffed chronic meds as is Virgil is Neurontin has helped his diabetic peripheral neuropathy and aware of importance of diet and medicine and lab compliance.  @discharge  The above diagnosis and treatment plan was discussed with the patient patient will continue appropriate diet and exercise as reviewed  Patient will recheck earlier if any interval problems of significance or clinical worsening of the above problems.  Agrees above surveillance.  All question were addressed regarding above meds

## 2025-03-03 PROBLEM — E11.69 TYPE 2 DIABETES MELLITUS WITH OTHER SPECIFIED COMPLICATION, WITHOUT LONG-TERM CURRENT USE OF INSULIN: Status: ACTIVE | Noted: 2025-03-03

## 2025-03-03 PROBLEM — I50.30 DIASTOLIC HEART FAILURE, UNSPECIFIED HF CHRONICITY: Status: ACTIVE | Noted: 2025-03-03

## 2025-03-03 ASSESSMENT — ENCOUNTER SYMPTOMS
SLEEP DISTURBANCE: 0
VOMITING: 0
FATIGUE: 0
HEADACHES: 0
WHEEZING: 0
CHOKING: 0
ARTHRALGIAS: 1
FLANK PAIN: 0
CONFUSION: 0
DYSURIA: 0
MYALGIAS: 1
SHORTNESS OF BREATH: 0
CHEST TIGHTNESS: 0
NUMBNESS: 1
PALPITATIONS: 0
UNEXPECTED WEIGHT CHANGE: 0
ABDOMINAL PAIN: 0
FEVER: 0
HEMATURIA: 0
FREQUENCY: 1
BLOOD IN STOOL: 0
WEAKNESS: 0
COUGH: 0
LIGHT-HEADEDNESS: 0

## 2025-03-04 NOTE — ASSESSMENT & PLAN NOTE
Diastolic failure has been stable no evidence of orthopnea PND chest pain shortness of breath or palpitations ejection fraction was 55 to 60% when done last August

## 2025-03-04 NOTE — ASSESSMENT & PLAN NOTE
Get his lab drawn after aortic and last September so I did order updated extensive diabetic lab.  He is aware of diabetic goals diabetic diet diabetic meds

## 2025-04-30 ENCOUNTER — APPOINTMENT (OUTPATIENT)
Dept: PRIMARY CARE | Facility: CLINIC | Age: 45
End: 2025-04-30
Payer: COMMERCIAL

## 2025-05-28 ENCOUNTER — HOSPITAL ENCOUNTER (OUTPATIENT)
Dept: OPERATING ROOM | Facility: HOSPITAL | Age: 45
Discharge: HOME | End: 2025-05-28

## 2025-05-28 DIAGNOSIS — I25.10 CAD IN NATIVE ARTERY: ICD-10-CM

## 2025-06-17 DIAGNOSIS — I25.119 CORONARY ARTERY DISEASE INVOLVING NATIVE CORONARY ARTERY OF NATIVE HEART WITH ANGINA PECTORIS: ICD-10-CM

## 2025-06-17 DIAGNOSIS — E78.5 DYSLIPIDEMIA: ICD-10-CM

## 2025-06-17 RX ORDER — ATORVASTATIN CALCIUM 80 MG/1
80 TABLET, FILM COATED ORAL DAILY
Qty: 30 TABLET | Refills: 3 | Status: SHIPPED | OUTPATIENT
Start: 2025-06-17 | End: 2025-10-15

## 2025-06-17 NOTE — TELEPHONE ENCOUNTER
Rx Refill Request     Name: Montana Kern  :  1980   Medication Name:  lipitor   Specific Pharmacy location:  [facility]   Date of last appointment:  2025   Date of next appointment:  Visit date not found   Best number to reach patient:  [phone]

## 2025-06-27 DIAGNOSIS — E11.9 TYPE 2 DIABETES MELLITUS TREATED WITHOUT INSULIN (MULTI): ICD-10-CM

## 2025-06-27 DIAGNOSIS — I10 HYPERTENSION, UNSPECIFIED TYPE: ICD-10-CM

## 2025-06-27 DIAGNOSIS — E78.5 DYSLIPIDEMIA: ICD-10-CM

## 2025-06-27 RX ORDER — SEMAGLUTIDE 1.34 MG/ML
1 INJECTION, SOLUTION SUBCUTANEOUS WEEKLY
Qty: 9 ML | Refills: 2 | Status: SHIPPED | OUTPATIENT
Start: 2025-06-27

## 2025-06-27 NOTE — TELEPHONE ENCOUNTER
Rx Refill Request Telephone Encounter    Name:  Montana Kern  : 1980     Medication Name:  Ozempic 1 mg/dose (4 mg/3 mL) pen injector [[id number]]    Order Details   PATIENT AVAILABLE NEXT WEEK IF APPT OPENS UP, HE IS ON VACATION ALL WEEK.    Dose, Route, Frequency: As Directed   Dispense Quantity: 9 mL Refills: 2    Duration: -- Dispense As Written: No          Sig: INJECT 1 MG UNDER THE SKIN ONCE WEEKLY ON WEDNESDAY             ALLERGIES:   nka    Specific Pharmacy location:  Pharmacy    [facility] - [address] [address]  Phone: [phone]  Fax: [phone]  JADON #: --       Date of last appointment:  25  Date of next appointment:  25    Best number to reach patient:  [phone]

## 2025-07-30 ENCOUNTER — APPOINTMENT (OUTPATIENT)
Dept: PRIMARY CARE | Facility: CLINIC | Age: 45
End: 2025-07-30
Payer: COMMERCIAL

## 2025-08-05 ENCOUNTER — APPOINTMENT (OUTPATIENT)
Dept: CARDIOLOGY | Facility: CLINIC | Age: 45
End: 2025-08-05
Payer: COMMERCIAL

## 2025-08-05 ENCOUNTER — TELEMEDICINE (OUTPATIENT)
Dept: CARDIAC SURGERY | Facility: CLINIC | Age: 45
End: 2025-08-05
Payer: COMMERCIAL

## 2025-08-05 DIAGNOSIS — Z95.1 S/P CABG (CORONARY ARTERY BYPASS GRAFT): Primary | ICD-10-CM

## 2025-08-05 ASSESSMENT — ENCOUNTER SYMPTOMS
LOSS OF SENSATION IN FEET: 0
DEPRESSION: 0
OCCASIONAL FEELINGS OF UNSTEADINESS: 0

## 2025-08-05 NOTE — PROGRESS NOTES
Chief Complaint  Surveillance visit    HPI:   Mr. Montana Kern is a 45 y.o. male, who presents for evaluation after surgical ablation. They are now 1 year out from their operation, and are recovering nicely.  They have resumed normal activities and appetite is returned to normal.  They have no chest pain, no shortness of breath, and denies palpitations, dizziness, or syncope.     Medical History[1]    Surgical History[2]    Family History[3]    Social History     Socioeconomic History    Marital status:      Spouse name: Not on file    Number of children: Not on file    Years of education: Not on file    Highest education level: Not on file   Occupational History    Not on file   Tobacco Use    Smoking status: Never    Smokeless tobacco: Never   Vaping Use    Vaping status: Never Used   Substance and Sexual Activity    Alcohol use: Yes     Comment: occasionally    Drug use: Never    Sexual activity: Defer   Other Topics Concern    Not on file   Social History Narrative    Not on file     Social Drivers of Health     Financial Resource Strain: Low Risk  (8/24/2024)    Overall Financial Resource Strain (CARDIA)     Difficulty of Paying Living Expenses: Not hard at all   Food Insecurity: No Food Insecurity (2/18/2025)    Hunger Vital Sign     Worried About Running Out of Food in the Last Year: Never true     Ran Out of Food in the Last Year: Never true   Transportation Needs: No Transportation Needs (8/24/2024)    PRAPARE - Transportation     Lack of Transportation (Medical): No     Lack of Transportation (Non-Medical): No   Physical Activity: Not on file   Stress: Not on file   Social Connections: Not on file   Intimate Partner Violence: Not on file   Housing Stability: Low Risk  (8/24/2024)    Housing Stability Vital Sign     Unable to Pay for Housing in the Last Year: No     Number of Times Moved in the Last Year: 0     Homeless in the Last Year: No       RX Allergies[4]    Encounter  Medications[5]    Physical Exam  Pulmonary:      Effort: Pulmonary effort is normal.     Neurological:      Mental Status: He is alert and oriented to person, place, and time.     Psychiatric:         Mood and Affect: Mood normal.          NYHA Score: 1  [id number] score:  3     CT Watchman 1/31/25: 5 mm stump on the ASHANTI with penditure device. Not significant.      Assessment and Plan:    Mr. Montana Kern is a 44 y.o. male, who is recovering well after surgery.  He is back to normal activities.  He has no evidence of thromboembolic disease, no speech, sensation or motor deficits.  I will plan on another visit with him in August at two years for the Clip-IT study.        [1]   Past Medical History:  Diagnosis Date    Acute bronchitis due to other specified organisms 11/26/2020    Acute bronchitis due to other specified organisms    Angina pectoris, unstable (Multi) 07/19/2024    Angina, class III 07/19/2024    Asthma     BMI 30.0-30.9,adult 07/19/2024    Diabetes (Multi)     type 2    Dyslipidemia 08/01/2023    Essential hypertension, benign 08/01/2023    Exertional chest pain 06/18/2024    Hyperlipidemia     Hypertension     Kidney stones     passed on own    Osteoarthritis     ankles    Personal history of other specified conditions 11/26/2020    History of persistent cough    S/P CABG x 3 09/12/2024    Type 2 diabetes mellitus treated without insulin (Multi) 08/01/2023   [2]   Past Surgical History:  Procedure Laterality Date    CARDIAC CATHETERIZATION N/A 07/26/2024    Procedure: Left Heart Cath;  Surgeon: Rome Guevara MD;  Location: [facility];  Service: Cardiovascular;  Laterality: N/A;  on Aspirin and Metoprolol, hold diabetic meds morning of    CARDIAC SURGERY  08/2024    open heart    HERNIA REPAIR      x 3    MULTIPLE TOOTH EXTRACTIONS      VASECTOMY     [3]   Family History  Problem Relation Name Age of Onset    Heart disease Mother      Stroke Father      Heart attack Father     [4] No  Known Allergies  [5]   Outpatient Encounter Medications as of 8/5/2025   Medication Sig Dispense Refill    aspirin 81 mg EC tablet Take 1 tablet (81 mg) by mouth once daily.      atorvastatin (Lipitor) 80 mg tablet Take 1 tablet (80 mg) by mouth once daily. 30 tablet 3    gabapentin (Neurontin) 300 mg capsule Take 1 capsule (300 mg) by mouth 3 times a day. 270 capsule 3    glimepiride (Amaryl) 4 mg tablet TAKE 1/2 TABLET BY MOUTH TWICE DAILY BEFORE A MEAL AND 1 TABLET BY MOUTH BEFORE DINNER 180 tablet 3    losartan (Cozaar) 25 mg tablet Take 1 tablet (25 mg) by mouth once daily. 90 tablet 1    metFORMIN (Glucophage) 1,000 mg tablet Take 1 tablet (1,000 mg) by mouth 2 times daily (morning and late afternoon). 180 tablet 3    metoprolol succinate XL (Toprol-XL) 100 mg 24 hr tablet Take 1 tablet (100 mg) by mouth once daily. Do not crush or chew. 90 tablet 3    semaglutide (Ozempic) 1 mg/dose (4 mg/3 mL) pen injector Inject 1 mg under the skin 1 (one) time per week. 9 mL 2     No facility-administered encounter medications on file as of 8/5/2025.

## 2025-08-05 NOTE — LETTER
August 5, 2025     Rome Guevara MD  [address], [address]  [facility] [address]    Patient: Montana Kern   YOB: 1980   Date of Visit: 8/5/2025       Dear Dr. Rome Guevara MD:    Thank you for referring Montana Kern to me for evaluation. Below are my notes for this consultation.  If you have questions, please do not hesitate to call me. I look forward to following your patient along with you.       Sincerely,     Bernabe Zhao MD      CC: Leonardo Muller, DO  ______________________________________________________________________________________    Chief Complaint  Surveillance visit    HPI:   Mr. Montana Kern is a 45 y.o. male, who presents for evaluation after surgical ablation. They are now 1 year out from their operation, and are recovering nicely.  They have resumed normal activities and appetite is returned to normal.  They have no chest pain, no shortness of breath, and denies palpitations, dizziness, or syncope.     Medical History[1]    Surgical History[2]    Family History[3]    Social History     Socioeconomic History   • Marital status:      Spouse name: Not on file   • Number of children: Not on file   • Years of education: Not on file   • Highest education level: Not on file   Occupational History   • Not on file   Tobacco Use   • Smoking status: Never   • Smokeless tobacco: Never   Vaping Use   • Vaping status: Never Used   Substance and Sexual Activity   • Alcohol use: Yes     Comment: occasionally   • Drug use: Never   • Sexual activity: Defer   Other Topics Concern   • Not on file   Social History Narrative   • Not on file     Social Drivers of Health     Financial Resource Strain: Low Risk  (8/24/2024)    Overall Financial Resource Strain (CARDIA)    • Difficulty of Paying Living Expenses: Not hard at all   Food Insecurity: No Food Insecurity (2/18/2025)    Hunger Vital Sign    • Worried About Running Out of Food in the Last Year: Never true     • Ran Out of Food in the Last Year: Never true   Transportation Needs: No Transportation Needs (8/24/2024)    PRAPARE - Transportation    • Lack of Transportation (Medical): No    • Lack of Transportation (Non-Medical): No   Physical Activity: Not on file   Stress: Not on file   Social Connections: Not on file   Intimate Partner Violence: Not on file   Housing Stability: Low Risk  (8/24/2024)    Housing Stability Vital Sign    • Unable to Pay for Housing in the Last Year: No    • Number of Times Moved in the Last Year: 0    • Homeless in the Last Year: No       RX Allergies[4]    Encounter Medications[5]    Physical Exam  Pulmonary:      Effort: Pulmonary effort is normal.     Neurological:      Mental Status: He is alert and oriented to person, place, and time.     Psychiatric:         Mood and Affect: Mood normal.          NYHA Score: 1  [id number] score:  3     CT Watchman 1/31/25: 5 mm stump on the ASHANTI with penditure device. Not significant.      Assessment and Plan:    Mr. Montana Kern is a 44 y.o. male, who is recovering well after surgery.  He is back to normal activities.  He has no evidence of thromboembolic disease, no speech, sensation or motor deficits.  I will plan on another visit with him in August at two years for the Clip-IT study.          [1]  Past Medical History:  Diagnosis Date   • Acute bronchitis due to other specified organisms 11/26/2020    Acute bronchitis due to other specified organisms   • Angina pectoris, unstable (Multi) 07/19/2024   • Angina, class III 07/19/2024   • Asthma    • BMI 30.0-30.9,adult 07/19/2024   • Diabetes (Multi)     type 2   • Dyslipidemia 08/01/2023   • Essential hypertension, benign 08/01/2023   • Exertional chest pain 06/18/2024   • Hyperlipidemia    • Hypertension    • Kidney stones     passed on own   • Osteoarthritis     ankles   • Personal history of other specified conditions 11/26/2020    History of persistent cough   • S/P CABG x 3 09/12/2024   •  Type 2 diabetes mellitus treated without insulin (Multi) 08/01/2023   [2]  Past Surgical History:  Procedure Laterality Date   • CARDIAC CATHETERIZATION N/A 07/26/2024    Procedure: Left Heart Cath;  Surgeon: Rome Guevara MD;  Location: [facility];  Service: Cardiovascular;  Laterality: N/A;  on Aspirin and Metoprolol, hold diabetic meds morning of   • CARDIAC SURGERY  08/2024    open heart   • HERNIA REPAIR      x 3   • MULTIPLE TOOTH EXTRACTIONS     • VASECTOMY     [3]  Family History  Problem Relation Name Age of Onset   • Heart disease Mother     • Stroke Father     • Heart attack Father     [4]  No Known Allergies  [5]  Outpatient Encounter Medications as of 8/5/2025   Medication Sig Dispense Refill   • aspirin 81 mg EC tablet Take 1 tablet (81 mg) by mouth once daily.     • atorvastatin (Lipitor) 80 mg tablet Take 1 tablet (80 mg) by mouth once daily. 30 tablet 3   • gabapentin (Neurontin) 300 mg capsule Take 1 capsule (300 mg) by mouth 3 times a day. 270 capsule 3   • glimepiride (Amaryl) 4 mg tablet TAKE 1/2 TABLET BY MOUTH TWICE DAILY BEFORE A MEAL AND 1 TABLET BY MOUTH BEFORE DINNER 180 tablet 3   • losartan (Cozaar) 25 mg tablet Take 1 tablet (25 mg) by mouth once daily. 90 tablet 1   • metFORMIN (Glucophage) 1,000 mg tablet Take 1 tablet (1,000 mg) by mouth 2 times daily (morning and late afternoon). 180 tablet 3   • metoprolol succinate XL (Toprol-XL) 100 mg 24 hr tablet Take 1 tablet (100 mg) by mouth once daily. Do not crush or chew. 90 tablet 3   • semaglutide (Ozempic) 1 mg/dose (4 mg/3 mL) pen injector Inject 1 mg under the skin 1 (one) time per week. 9 mL 2     No facility-administered encounter medications on file as of 8/5/2025.

## 2025-08-24 LAB
ALBUMIN SERPL-MCNC: 4.5 G/DL (ref 3.6–5.1)
ALP SERPL-CCNC: 64 U/L (ref 36–130)
ALT SERPL-CCNC: 23 U/L (ref 9–46)
ANION GAP SERPL CALCULATED.4IONS-SCNC: 7 MMOL/L (CALC) (ref 7–17)
AST SERPL-CCNC: 20 U/L (ref 10–40)
BILIRUB SERPL-MCNC: 1 MG/DL (ref 0.2–1.2)
BUN SERPL-MCNC: 12 MG/DL (ref 7–25)
CALCIUM SERPL-MCNC: 9.2 MG/DL (ref 8.6–10.3)
CHLORIDE SERPL-SCNC: 99 MMOL/L (ref 98–110)
CHOLEST SERPL-MCNC: 106 MG/DL
CHOLEST/HDLC SERPL: 2.2 (CALC)
CO2 SERPL-SCNC: 29 MMOL/L (ref 20–32)
CREAT SERPL-MCNC: 0.87 MG/DL (ref 0.6–1.29)
EGFRCR SERPLBLD CKD-EPI 2021: 107 ML/MIN/1.73M2
EST. AVERAGE GLUCOSE BLD GHB EST-MCNC: 255 MG/DL
EST. AVERAGE GLUCOSE BLD GHB EST-SCNC: 14.1 MMOL/L
GLUCOSE SERPL-MCNC: 171 MG/DL (ref 65–99)
HBA1C MFR BLD: 10.5 %
HDLC SERPL-MCNC: 49 MG/DL
LDLC SERPL CALC-MCNC: 46 MG/DL (CALC)
NONHDLC SERPL-MCNC: 57 MG/DL (CALC)
POTASSIUM SERPL-SCNC: 4.3 MMOL/L (ref 3.5–5.3)
PROT SERPL-MCNC: 6.9 G/DL (ref 6.1–8.1)
SODIUM SERPL-SCNC: 135 MMOL/L (ref 135–146)
TRIGL SERPL-MCNC: 40 MG/DL

## 2025-08-25 LAB
ALBUMIN/CREAT UR: 13 MG/G CREAT
CREAT UR-MCNC: 357 MG/DL (ref 20–320)
MICROALBUMIN UR-MCNC: 4.5 MG/DL

## 2025-08-27 ENCOUNTER — APPOINTMENT (OUTPATIENT)
Dept: PRIMARY CARE | Facility: CLINIC | Age: 45
End: 2025-08-27
Payer: COMMERCIAL

## 2025-08-28 ENCOUNTER — TELEPHONE (OUTPATIENT)
Dept: PRIMARY CARE | Facility: CLINIC | Age: 45
End: 2025-08-28
Payer: COMMERCIAL

## 2025-08-31 PROBLEM — K43.9 HERNIA OF ABDOMINAL WALL: Status: ACTIVE | Noted: 2025-08-31

## 2025-08-31 ASSESSMENT — ENCOUNTER SYMPTOMS
ARTHRALGIAS: 0
HEADACHES: 0
NAUSEA: 0
HEMATURIA: 0
WEAKNESS: 0
COUGH: 0
SHORTNESS OF BREATH: 0
NUMBNESS: 0
CHEST TIGHTNESS: 0
FREQUENCY: 0
SLEEP DISTURBANCE: 0
RECTAL PAIN: 0
BLOOD IN STOOL: 0
VOMITING: 0
CONFUSION: 0
PALPITATIONS: 0
MYALGIAS: 1
DYSURIA: 0
FEVER: 0
UNEXPECTED WEIGHT CHANGE: 0
LIGHT-HEADEDNESS: 0
ABDOMINAL PAIN: 1
FATIGUE: 0

## 2025-09-29 ENCOUNTER — APPOINTMENT (OUTPATIENT)
Dept: SURGERY | Facility: CLINIC | Age: 45
End: 2025-09-29
Payer: COMMERCIAL

## 2025-10-01 ENCOUNTER — APPOINTMENT (OUTPATIENT)
Dept: PRIMARY CARE | Facility: CLINIC | Age: 45
End: 2025-10-01
Payer: COMMERCIAL

## (undated) DEVICE — Device

## (undated) DEVICE — MANIFOLD, 4 PORT NEPTUNE STANDARD

## (undated) DEVICE — HANDLE, LITE EZ, PLASTIC, DISP, LF

## (undated) DEVICE — SOLUTION, SCRUB EXIDINE, 4% CHG, 8 OZ

## (undated) DEVICE — HANDLE COVER, LIGHT, RIGID, DISP, LF

## (undated) DEVICE — DEVICE, ENDOSCOPIC VESSEL HARVESTING, SAPHENA VENAPAX

## (undated) DEVICE — SUTURE, VICRYL, 4-0, 18 IN, PS2, UNDYED

## (undated) DEVICE — CANNULA, MULTIPLE PERFUSION SET, 15"

## (undated) DEVICE — SPONGE, HEMOSTATIC, GELATIN, SURGIFOAM, 8 X 12.5 CM X 10 MM

## (undated) DEVICE — KIT, FAST START

## (undated) DEVICE — SUTURE, MONOCRYL, 4-0, 27 IN, PS-2, UNDYED

## (undated) DEVICE — GLOVE, SURGICAL, PROTEXIS NEOPRENE, 8.0, PF, LF

## (undated) DEVICE — SENSOR, OXYGEN, CEREBRAL, SOMASENSOR, ADULT

## (undated) DEVICE — NEEDLE, SAFETY, 25 GA X 1.5 IN

## (undated) DEVICE — PUMP, STRYKERFLOW 2 & HANDPIECE W/10FT. IRRIGATION TUBING

## (undated) DEVICE — SOLUTION, INJECTION, USP, SODIUM CHLORIDE 0.9%, .9, NACL, 1000 ML, BAG

## (undated) DEVICE — STOCKINETTE, TUBULAR, DBL PLY, PRECUT, 3 X 36 IN, STERILE

## (undated) DEVICE — SOLUTION, INJECTION, USP, PLASMALYTE A, PH 7.4, TYPE 1, 1000 ML, BAG

## (undated) DEVICE — EXTENSION SET, IV, SOFT, 20 IN

## (undated) DEVICE — SOLUTION, IRRIGATION, USP, SODIUM CHLORIDE 0.9%, 3000 ML, BAG

## (undated) DEVICE — NEEDLE, INSUFFLATION, 14 G, 100 MM

## (undated) DEVICE — SYRINGE, 20 CC, LUER LOCK

## (undated) DEVICE — KNIFE, MICRO, 15 DEG BLADE AND TIP, DISP

## (undated) DEVICE — SUTURE, PROLENE, 6-0, 30 IN, C-1, CV-11, BLUE

## (undated) DEVICE — PUNCH, AORTIC 4MM, BULLET TIP

## (undated) DEVICE — GLOVE, SURGICAL, PROTEXIS PI BLUE W/NEUTHERA, 6.5, PF, LF

## (undated) DEVICE — CLIP, ENDO APPLIER LIGAMAX 5MM

## (undated) DEVICE — SUTURE, PROLENE, 3-0, 36 IN, SH, DA, BLUE

## (undated) DEVICE — SOLUTION, SODIUM CHLORIDE 0.9%, 3000ML, BAG

## (undated) DEVICE — HEMOSTAT, ABSORABABLE, SURGICEL SNOW, 2 X 4, LF

## (undated) DEVICE — SENSOR, FLOTRAC, 84IN 213CM

## (undated) DEVICE — SUTURE, VICRYL, 3-0, 27 IN, SH

## (undated) DEVICE — SUTURE, ETHIBOND, XTRA, 30 IN, 0, CT-1, GREEN

## (undated) DEVICE — ELECTROSURGICAL, CLEANER, LECTROBRASIVE

## (undated) DEVICE — CATHETER, OPTITORQUE, 5FR, JACKY, 3.5/ 2H/110CM, CURVED

## (undated) DEVICE — SUTURE, VICRYL, 0, 18 IN, CT-1, UNDYED

## (undated) DEVICE — BALLOON, PREPERITONEAL, DISSECTOR, KIDNEY, SPACEMAKER

## (undated) DEVICE — CLIP, LIGATING, W/ADHESIVE, WIDE SLOT, SMALL, TITANIUM

## (undated) DEVICE — CANNULA, AORTIC, ROOT, STANDARD, FLANGE, RADIOPAQUE TIP, W/FLOW-GUARD, 9 FR X 14 CM

## (undated) DEVICE — FILTER, IV, BLOOD, MICROAGGREGATE, 40 MIC, RBC TRANSFUSION

## (undated) DEVICE — GRASPER, W/ CARTRIDGE, 5MM X 45CM, DISP

## (undated) DEVICE — GLOVE, SURGICAL, PROTEXIS PI , 7.0, PF, LF

## (undated) DEVICE — CORD, MONOPOLARD, 10FT, DISP

## (undated) DEVICE — TRAY, DRY PREP, PREMIUM

## (undated) DEVICE — SPONGE, ENDOSCOPIC SPECIALTY, 5MM, WHITE

## (undated) DEVICE — CANNULA, ARTERIOTOMY, BULB TIP, 2 MM X 3.2 CM

## (undated) DEVICE — TIP,  ELECTRODE COATED INSULATED, EXTENDED, LF

## (undated) DEVICE — FIXATION DEVICE, 20 TRACKER, SINGLE USE, LF

## (undated) DEVICE — TRAY, SURESTEP, SILICONE DRAINAGE BAG, STATLOCK, 16FR

## (undated) DEVICE — BAG, DECANTER

## (undated) DEVICE — DRAPE, SHEET, CARDIOVASCULAR, ANTIMICROBIAL, W/ANESTHESIA SCREEN, IOBAN 2, STERI DRAPE, 107 X 133 IN, DISPOSABLE, FABRIC, BLUE, STERILE

## (undated) DEVICE — TROCAR SYSTEM, BALLOON, KII GELPORT, 12 X 100MM

## (undated) DEVICE — SUTURE, PROLENE, 0, 30 IN, CT1, BLUE

## (undated) DEVICE — TUBING PACK, OXYGENATOR, ADULT

## (undated) DEVICE — SOLUTION, IRRIGATION, SODIUM CHLORIDE 0.9%, 1000 ML, POUR BOTTLE

## (undated) DEVICE — BAND, D-STAT RADIAL, TOPICAL HEMOSTAT

## (undated) DEVICE — SHEAR, W/UNIPOLAR CAUTERY, ENDOSHEAR, 5 MM

## (undated) DEVICE — PADS, STOCKERT MOUNTING F/LOW LEVEL SENSOR II

## (undated) DEVICE — DRESSING, MEDIPORE W/PAD, 3-1/2X13-3/4 IN

## (undated) DEVICE — COLLECTION UNIT, DRAINAGE, THORACIC, SINGLE TUBE, DRY SUCTION, ATS COMPATIBLE, OASIS 3600, LF

## (undated) DEVICE — DRESSING, TRANSPARENT, TEGADERM, 4 X 4-3/4 IN

## (undated) DEVICE — CONNECTOR, 3/8 X 1/4, STRAIGHT, STERILE

## (undated) DEVICE — ADHESIVE, SKIN, LIQUIBAND EXCEED

## (undated) DEVICE — DRAPE, SHEET, XL

## (undated) DEVICE — BANDAGE, ELASTIC, PREMIUM, SELF-CLOSE, 4 IN X 5.5 YD, STERILE

## (undated) DEVICE — GOWN, SURGICAL, ROYAL SILK, XL, STERILE

## (undated) DEVICE — SUTURE, VICRYL 2-0, TAPER POINT, CT-1 UNDYED 27 INCH

## (undated) DEVICE — DRAPE SHEET, UTILITY, 26 X 15, W/ TAPE, STERILE

## (undated) DEVICE — DRESSING, ISLAND, TELFA, 4 X 5 IN

## (undated) DEVICE — SUTURE, VICRYL, 0, 27 IN, UR-6, VIOLET

## (undated) DEVICE — MICROCOAGULATION TEST, ACT+ TEST CUVETTE

## (undated) DEVICE — BLADE, SAW STERNUM, STERILE

## (undated) DEVICE — TUBING, SUCTION, CONNECTING, NON-CONDUCTIVE, SURE GRIP CONNECTORS, 3/16 X 18 IN, PVC

## (undated) DEVICE — CANNULA, RETROGRADE, 15FR, W/AUTO INFLATE

## (undated) DEVICE — SUTURE, STEEL, 7, 18 IN, CCS, SILVER

## (undated) DEVICE — DEPRESSOR, TONGUE, 6 IN, WOOD, STERILE, LF

## (undated) DEVICE — CANNULA, EOPA 20F W/O GUIDEWIRE

## (undated) DEVICE — APPLICATOR, CHLORAPREP, W/ORANGE TINT, 26ML

## (undated) DEVICE — SUTURE, PROLENE 4-0, TAPER POINT, SH-1 BLUE 30 INCH

## (undated) DEVICE — DRAPE, FLUID WARMING

## (undated) DEVICE — SYSTEM, SMOKE EVACUATION LAPAROSCOPIC SEECLEAR MAX

## (undated) DEVICE — TUBING, CLEAR N-COND, 5MM X 10, LF

## (undated) DEVICE — SHUNT, SENSOR

## (undated) DEVICE — KIT, EXTENSION LINE, PRESSURE, RETROGRADE

## (undated) DEVICE — SUTURE, PROLENE, 7-0, 30 IN, BV1, DA, BLUE

## (undated) DEVICE — CAUTERY, PENCIL, PUSH BUTTON, SMOKE EVAC, 70MM

## (undated) DEVICE — TUBE SET, PNEUMOLAR HEATED, SMOKE EVACU, HIGH-FLOW

## (undated) DEVICE — STRAP, ARM BOARD, 32 X 1.5

## (undated) DEVICE — PROTECTOR, NERVE, ULNAR, PINK

## (undated) DEVICE — HOLSTER, JET SAFETY

## (undated) DEVICE — STRAP, VELCRO, BODY, 4 X 60IN, NS

## (undated) DEVICE — CANNULA, VENOUS 2 STAGE 32/40

## (undated) DEVICE — ELECTRODE, ELECTROSURGICAL, BLADE EXT 4 INCH, INSULATED

## (undated) DEVICE — TUBING, MANIFOLD, LOW PRESSURE

## (undated) DEVICE — DRAIN, CHANNEL, BLAKE, HUBLESS, ROUND, 28FR

## (undated) DEVICE — CUP, MEDICINE, GRADUATED, 2 OZ, PLASTIC, DISP, LF

## (undated) DEVICE — CATHETER KIT, CENTRAL VENOUS, MULTI-LUMEN, 7 FR, 16 CM

## (undated) DEVICE — DISSECTOR, ENDOSCOPIC, W/UNIPOLAR CAUTERY, 5 MM

## (undated) DEVICE — GLOVE, SURGICAL, PROTEXIS PI , 6.5, PF, LF

## (undated) DEVICE — GLOVE, SURGICAL, PROTEXIS PI , 7.5, PF, LF

## (undated) DEVICE — ENDO, PORT VERSASTEP 5MM

## (undated) DEVICE — GOWN, SURGICAL, ROYAL SILK, LG, STERILE

## (undated) DEVICE — DRAPE, SHEET, ENDOSCOPY, GENERAL, FENESTRATED, ARMBOARD COVER, 98 X 123.5 IN, DISPOSABLE, LF, STERILE

## (undated) DEVICE — SOLUTION KIT, ANTIFOG, 6CC, LF

## (undated) DEVICE — PERFUSION PACK, HEMOCONCENTRATOR, MINNTECH, W/TUBING

## (undated) DEVICE — TOWEL PACK 10-PK

## (undated) DEVICE — ELECTRODE, ELECTROSURGICAL, BLADE, INSULATED, ENT/IMA, STERILE

## (undated) DEVICE — ADAPTER, CARDIOPLEGIA, VENTING, Y, 19.1 CM

## (undated) DEVICE — DRAPE, INSTRUMENT, W/POUCH, STERI DRAPE, 7 X 11 IN, DISPOSABLE, STERILE

## (undated) DEVICE — PACING CABLE, EXTENSION, 12 FT BEIGE, DISPOSABLE

## (undated) DEVICE — SHEATH, GLIDESHEATH, SLENDER, 6FR 10CM

## (undated) DEVICE — INSERT, CLAMP, SURGICAL, SOFT/TRACTION, STEALTH, 5 MM

## (undated) DEVICE — SPONGE, GAUZE, XRAY DECT, 16 PLY, 4 X 8, W/MASTER DMT,STERILE

## (undated) DEVICE — BLADE, GEN COATED 2.75, LF

## (undated) DEVICE — BANDAGE, ELASTIC, ACE, ACE, DOUBLE LENGTH, 6 X 550 IN, LF

## (undated) DEVICE — SUTURE, PROLENE, 5-0, 36 IN, RB1, DA, BLUE

## (undated) DEVICE — SYRINGE, CONTROL, ANGIOGRAPHIC, FIXED MALE LUER, 10 CC

## (undated) DEVICE — CLIP, LIGATING, W/ADHESIVE PAD, MEDIUM, TITANIUM

## (undated) DEVICE — SUTURE, PROLENE, 4-0, 36 IN, RB1, DA, BLUE

## (undated) DEVICE — TRAY, SURESTEP, URINE METER, 14FR, SILICONE

## (undated) DEVICE — BONE WAX, 2.5G ABSORBABLE, OSTENE

## (undated) DEVICE — SOLUTION, IRRIGATION, STERILE WATER, 1000 ML, POUR BOTTLE

## (undated) DEVICE — BLOWER MISTER KIT, CLEARVIEW, MALLEABLE SHAFT, W/TUBING, 16.5 CM

## (undated) DEVICE — CASSETTE, BLOOD, PLEGIC SET

## (undated) DEVICE — OXYGENATOR FX 25, W/HR, ARTERIAL FILTER

## (undated) DEVICE — CATHETER KIT, RADIAL ARTLINE, 20G X 1 3/4

## (undated) DEVICE — SUTURE, NUROLON, 0, 18 IN, CT1, DETACHABLE, MULTIPACK, BLACK

## (undated) DEVICE — BALL, COTTON, STANDARD, STERILE

## (undated) DEVICE — SPONGE, LAP, XRAY DECT, 18IN X 18IN, W/MASTER DMT, STERILE

## (undated) DEVICE — MARKER, SKIN, W/ RULER, LF, STERILE

## (undated) DEVICE — DRESSING, GAUZE, SPONGE, 8 PLY, CURITY, 2 X 2 IN, STERILE